# Patient Record
Sex: MALE | Race: OTHER | HISPANIC OR LATINO | Employment: UNEMPLOYED | URBAN - METROPOLITAN AREA
[De-identification: names, ages, dates, MRNs, and addresses within clinical notes are randomized per-mention and may not be internally consistent; named-entity substitution may affect disease eponyms.]

---

## 2020-08-13 ENCOUNTER — APPOINTMENT (OUTPATIENT)
Dept: CT IMAGING | Age: 60
DRG: 241 | End: 2020-08-13
Attending: EMERGENCY MEDICINE
Payer: MEDICAID

## 2020-08-13 ENCOUNTER — HOSPITAL ENCOUNTER (INPATIENT)
Age: 60
LOS: 6 days | Discharge: HOME | DRG: 241 | End: 2020-08-19
Attending: EMERGENCY MEDICINE | Admitting: SURGERY
Payer: MEDICAID

## 2020-08-13 ENCOUNTER — APPOINTMENT (OUTPATIENT)
Dept: CT IMAGING | Age: 60
DRG: 241 | End: 2020-08-13
Attending: SURGERY
Payer: MEDICAID

## 2020-08-13 ENCOUNTER — APPOINTMENT (OUTPATIENT)
Dept: GENERAL RADIOLOGY | Age: 60
DRG: 241 | End: 2020-08-13
Attending: SURGERY
Payer: MEDICAID

## 2020-08-13 DIAGNOSIS — K85.81 OTHER ACUTE PANCREATITIS WITH UNINFECTED NECROSIS: ICD-10-CM

## 2020-08-13 DIAGNOSIS — R10.9 CONTINUOUS SEVERE ABDOMINAL PAIN: ICD-10-CM

## 2020-08-13 DIAGNOSIS — K86.89 PANCREATIC MASS: ICD-10-CM

## 2020-08-13 DIAGNOSIS — R11.2 NON-INTRACTABLE VOMITING WITH NAUSEA, UNSPECIFIED VOMITING TYPE: ICD-10-CM

## 2020-08-13 DIAGNOSIS — K26.5 DUODENAL ULCER WITH PERFORATION (HCC): Primary | ICD-10-CM

## 2020-08-13 LAB
ALBUMIN SERPL-MCNC: 3.8 G/DL (ref 3.5–5)
ALBUMIN/GLOB SERPL: 0.9 {RATIO} (ref 1.1–2.2)
ALP SERPL-CCNC: 104 U/L (ref 45–117)
ALT SERPL-CCNC: 17 U/L (ref 12–78)
ANION GAP SERPL CALC-SCNC: 4 MMOL/L (ref 5–15)
AST SERPL-CCNC: 20 U/L (ref 15–37)
BASOPHILS # BLD: 0.1 K/UL (ref 0–0.1)
BASOPHILS # BLD: 0.2 K/UL (ref 0–0.1)
BASOPHILS NFR BLD: 0 % (ref 0–1)
BASOPHILS NFR BLD: 1 % (ref 0–1)
BILIRUB SERPL-MCNC: 0.2 MG/DL (ref 0.2–1)
BUN SERPL-MCNC: 13 MG/DL (ref 6–20)
BUN/CREAT SERPL: 12 (ref 12–20)
CALCIUM SERPL-MCNC: 9.3 MG/DL (ref 8.5–10.1)
CHLORIDE SERPL-SCNC: 104 MMOL/L (ref 97–108)
CO2 SERPL-SCNC: 31 MMOL/L (ref 21–32)
COMMENT, HOLDF: NORMAL
CREAT SERPL-MCNC: 1.06 MG/DL (ref 0.7–1.3)
DIFFERENTIAL METHOD BLD: ABNORMAL
DIFFERENTIAL METHOD BLD: ABNORMAL
EOSINOPHIL # BLD: 0.1 K/UL (ref 0–0.4)
EOSINOPHIL # BLD: 0.4 K/UL (ref 0–0.4)
EOSINOPHIL NFR BLD: 0 % (ref 0–7)
EOSINOPHIL NFR BLD: 2 % (ref 0–7)
ERYTHROCYTE [DISTWIDTH] IN BLOOD BY AUTOMATED COUNT: 15 % (ref 11.5–14.5)
ERYTHROCYTE [DISTWIDTH] IN BLOOD BY AUTOMATED COUNT: 15 % (ref 11.5–14.5)
GLOBULIN SER CALC-MCNC: 4.1 G/DL (ref 2–4)
GLUCOSE SERPL-MCNC: 109 MG/DL (ref 65–100)
HCT VFR BLD AUTO: 46.8 % (ref 36.6–50.3)
HCT VFR BLD AUTO: 49 % (ref 36.6–50.3)
HGB BLD-MCNC: 15.3 G/DL (ref 12.1–17)
HGB BLD-MCNC: 15.9 G/DL (ref 12.1–17)
IMM GRANULOCYTES # BLD AUTO: 0 K/UL (ref 0–0.04)
IMM GRANULOCYTES # BLD AUTO: 0.1 K/UL (ref 0–0.04)
IMM GRANULOCYTES NFR BLD AUTO: 0 % (ref 0–0.5)
IMM GRANULOCYTES NFR BLD AUTO: 1 % (ref 0–0.5)
LIPASE SERPL-CCNC: 1517 U/L (ref 73–393)
LYMPHOCYTES # BLD: 2.6 K/UL (ref 0.8–3.5)
LYMPHOCYTES # BLD: 5.4 K/UL (ref 0.8–3.5)
LYMPHOCYTES NFR BLD: 13 % (ref 12–49)
LYMPHOCYTES NFR BLD: 25 % (ref 12–49)
MCH RBC QN AUTO: 27.5 PG (ref 26–34)
MCH RBC QN AUTO: 27.6 PG (ref 26–34)
MCHC RBC AUTO-ENTMCNC: 32.4 G/DL (ref 30–36.5)
MCHC RBC AUTO-ENTMCNC: 32.7 G/DL (ref 30–36.5)
MCV RBC AUTO: 84.3 FL (ref 80–99)
MCV RBC AUTO: 84.8 FL (ref 80–99)
MONOCYTES # BLD: 0.9 K/UL (ref 0–1)
MONOCYTES # BLD: 1.5 K/UL (ref 0–1)
MONOCYTES NFR BLD: 4 % (ref 5–13)
MONOCYTES NFR BLD: 7 % (ref 5–13)
NEUTS BAND NFR BLD MANUAL: 1 %
NEUTS SEG # BLD: 14.3 K/UL (ref 1.8–8)
NEUTS SEG # BLD: 16.2 K/UL (ref 1.8–8)
NEUTS SEG NFR BLD: 65 % (ref 32–75)
NEUTS SEG NFR BLD: 79 % (ref 32–75)
NRBC # BLD: 0 K/UL (ref 0–0.01)
NRBC # BLD: 0 K/UL (ref 0–0.01)
NRBC BLD-RTO: 0 PER 100 WBC
NRBC BLD-RTO: 0 PER 100 WBC
OTHER CELLS NFR BLD MANUAL: 2 %
PLATELET # BLD AUTO: 310 K/UL (ref 150–400)
PLATELET # BLD AUTO: 325 K/UL (ref 150–400)
PMV BLD AUTO: 9.5 FL (ref 8.9–12.9)
PMV BLD AUTO: 9.7 FL (ref 8.9–12.9)
POTASSIUM SERPL-SCNC: 3.2 MMOL/L (ref 3.5–5.1)
PROT SERPL-MCNC: 7.9 G/DL (ref 6.4–8.2)
RBC # BLD AUTO: 5.55 M/UL (ref 4.1–5.7)
RBC # BLD AUTO: 5.78 M/UL (ref 4.1–5.7)
RBC MORPH BLD: ABNORMAL
SAMPLES BEING HELD,HOLD: NORMAL
SODIUM SERPL-SCNC: 139 MMOL/L (ref 136–145)
WBC # BLD AUTO: 20.5 K/UL (ref 4.1–11.1)
WBC # BLD AUTO: 21.7 K/UL (ref 4.1–11.1)

## 2020-08-13 PROCEDURE — C1729 CATH, DRAINAGE: HCPCS

## 2020-08-13 PROCEDURE — 87205 SMEAR GRAM STAIN: CPT

## 2020-08-13 PROCEDURE — 96361 HYDRATE IV INFUSION ADD-ON: CPT

## 2020-08-13 PROCEDURE — 74177 CT ABD & PELVIS W/CONTRAST: CPT

## 2020-08-13 PROCEDURE — 86677 HELICOBACTER PYLORI ANTIBODY: CPT

## 2020-08-13 PROCEDURE — 65270000029 HC RM PRIVATE

## 2020-08-13 PROCEDURE — C9113 INJ PANTOPRAZOLE SODIUM, VIA: HCPCS | Performed by: SURGERY

## 2020-08-13 PROCEDURE — 77030003630 HC NDL PERC VASC COOK -B

## 2020-08-13 PROCEDURE — 99221 1ST HOSP IP/OBS SF/LOW 40: CPT | Performed by: SURGERY

## 2020-08-13 PROCEDURE — 0D993ZZ DRAINAGE OF DUODENUM, PERCUTANEOUS APPROACH: ICD-10-PCS | Performed by: STUDENT IN AN ORGANIZED HEALTH CARE EDUCATION/TRAINING PROGRAM

## 2020-08-13 PROCEDURE — 74018 RADEX ABDOMEN 1 VIEW: CPT

## 2020-08-13 PROCEDURE — 80053 COMPREHEN METABOLIC PANEL: CPT

## 2020-08-13 PROCEDURE — 96374 THER/PROPH/DIAG INJ IV PUSH: CPT

## 2020-08-13 PROCEDURE — 74011250636 HC RX REV CODE- 250/636: Performed by: SURGERY

## 2020-08-13 PROCEDURE — 99152 MOD SED SAME PHYS/QHP 5/>YRS: CPT

## 2020-08-13 PROCEDURE — 74011250636 HC RX REV CODE- 250/636: Performed by: EMERGENCY MEDICINE

## 2020-08-13 PROCEDURE — 87075 CULTR BACTERIA EXCEPT BLOOD: CPT

## 2020-08-13 PROCEDURE — 74011000250 HC RX REV CODE- 250: Performed by: SURGERY

## 2020-08-13 PROCEDURE — 85025 COMPLETE CBC W/AUTO DIFF WBC: CPT

## 2020-08-13 PROCEDURE — 74011636320 HC RX REV CODE- 636/320: Performed by: EMERGENCY MEDICINE

## 2020-08-13 PROCEDURE — 74011000258 HC RX REV CODE- 258: Performed by: SURGERY

## 2020-08-13 PROCEDURE — 36415 COLL VENOUS BLD VENIPUNCTURE: CPT

## 2020-08-13 PROCEDURE — 93005 ELECTROCARDIOGRAM TRACING: CPT

## 2020-08-13 PROCEDURE — 83690 ASSAY OF LIPASE: CPT

## 2020-08-13 PROCEDURE — 99285 EMERGENCY DEPT VISIT HI MDM: CPT

## 2020-08-13 RX ORDER — SODIUM CHLORIDE 0.9 % (FLUSH) 0.9 %
10 SYRINGE (ML) INJECTION
Status: COMPLETED | OUTPATIENT
Start: 2020-08-13 | End: 2020-08-13

## 2020-08-13 RX ORDER — LORAZEPAM 2 MG/ML
1 INJECTION INTRAMUSCULAR ONCE
Status: ACTIVE | OUTPATIENT
Start: 2020-08-13 | End: 2020-08-13

## 2020-08-13 RX ORDER — HYDROMORPHONE HYDROCHLORIDE 2 MG/ML
1 INJECTION, SOLUTION INTRAMUSCULAR; INTRAVENOUS; SUBCUTANEOUS ONCE
Status: DISPENSED | OUTPATIENT
Start: 2020-08-13 | End: 2020-08-13

## 2020-08-13 RX ORDER — ONDANSETRON 2 MG/ML
INJECTION INTRAMUSCULAR; INTRAVENOUS
Status: DISPENSED
Start: 2020-08-13 | End: 2020-08-13

## 2020-08-13 RX ORDER — ONDANSETRON 2 MG/ML
4 INJECTION INTRAMUSCULAR; INTRAVENOUS
Status: DISCONTINUED | OUTPATIENT
Start: 2020-08-13 | End: 2020-08-13

## 2020-08-13 RX ORDER — FENTANYL CITRATE 50 UG/ML
100 INJECTION, SOLUTION INTRAMUSCULAR; INTRAVENOUS
Status: DISCONTINUED | OUTPATIENT
Start: 2020-08-13 | End: 2020-08-13

## 2020-08-13 RX ORDER — SODIUM CHLORIDE 9 MG/ML
125 INJECTION, SOLUTION INTRAVENOUS CONTINUOUS
Status: DISCONTINUED | OUTPATIENT
Start: 2020-08-13 | End: 2020-08-19 | Stop reason: HOSPADM

## 2020-08-13 RX ORDER — SODIUM CHLORIDE 9 MG/ML
25 INJECTION, SOLUTION INTRAVENOUS CONTINUOUS
Status: DISCONTINUED | OUTPATIENT
Start: 2020-08-13 | End: 2020-08-13

## 2020-08-13 RX ORDER — HYDROMORPHONE HYDROCHLORIDE 1 MG/ML
0.5 INJECTION, SOLUTION INTRAMUSCULAR; INTRAVENOUS; SUBCUTANEOUS
Status: DISCONTINUED | OUTPATIENT
Start: 2020-08-13 | End: 2020-08-19 | Stop reason: HOSPADM

## 2020-08-13 RX ORDER — MORPHINE SULFATE 2 MG/ML
4 INJECTION, SOLUTION INTRAMUSCULAR; INTRAVENOUS
Status: DISCONTINUED | OUTPATIENT
Start: 2020-08-13 | End: 2020-08-13

## 2020-08-13 RX ORDER — ONDANSETRON 2 MG/ML
4 INJECTION INTRAMUSCULAR; INTRAVENOUS
Status: DISCONTINUED | OUTPATIENT
Start: 2020-08-13 | End: 2020-08-19 | Stop reason: HOSPADM

## 2020-08-13 RX ORDER — MIDAZOLAM HYDROCHLORIDE 1 MG/ML
1-5 INJECTION INTRAMUSCULAR; INTRAVENOUS
Status: DISCONTINUED | OUTPATIENT
Start: 2020-08-13 | End: 2020-08-13

## 2020-08-13 RX ADMIN — PIPERACILLIN AND TAZOBACTAM 3.38 G: 3; .375 INJECTION, POWDER, LYOPHILIZED, FOR SOLUTION INTRAVENOUS at 11:55

## 2020-08-13 RX ADMIN — SODIUM CHLORIDE 1000 ML: 900 INJECTION, SOLUTION INTRAVENOUS at 01:17

## 2020-08-13 RX ADMIN — PIPERACILLIN AND TAZOBACTAM 3.38 G: 3; .375 INJECTION, POWDER, LYOPHILIZED, FOR SOLUTION INTRAVENOUS at 03:16

## 2020-08-13 RX ADMIN — FENTANYL CITRATE 50 MCG: 50 INJECTION, SOLUTION INTRAMUSCULAR; INTRAVENOUS at 09:51

## 2020-08-13 RX ADMIN — HYDROMORPHONE HYDROCHLORIDE 0.5 MG: 1 INJECTION, SOLUTION INTRAMUSCULAR; INTRAVENOUS; SUBCUTANEOUS at 05:23

## 2020-08-13 RX ADMIN — ONDANSETRON 4 MG: 2 INJECTION INTRAMUSCULAR; INTRAVENOUS at 02:56

## 2020-08-13 RX ADMIN — IOPAMIDOL 100 ML: 755 INJECTION, SOLUTION INTRAVENOUS at 02:08

## 2020-08-13 RX ADMIN — MORPHINE SULFATE 4 MG: 2 INJECTION, SOLUTION INTRAMUSCULAR; INTRAVENOUS at 01:22

## 2020-08-13 RX ADMIN — HYDROMORPHONE HYDROCHLORIDE 0.5 MG: 1 INJECTION, SOLUTION INTRAMUSCULAR; INTRAVENOUS; SUBCUTANEOUS at 14:47

## 2020-08-13 RX ADMIN — SODIUM CHLORIDE 40 MG: 9 INJECTION, SOLUTION INTRAMUSCULAR; INTRAVENOUS; SUBCUTANEOUS at 20:34

## 2020-08-13 RX ADMIN — FENTANYL CITRATE 50 MCG: 50 INJECTION, SOLUTION INTRAMUSCULAR; INTRAVENOUS at 09:46

## 2020-08-13 RX ADMIN — PIPERACILLIN AND TAZOBACTAM 3.38 G: 3; .375 INJECTION, POWDER, LYOPHILIZED, FOR SOLUTION INTRAVENOUS at 19:26

## 2020-08-13 RX ADMIN — MORPHINE SULFATE 4 MG: 2 INJECTION, SOLUTION INTRAMUSCULAR; INTRAVENOUS at 02:56

## 2020-08-13 RX ADMIN — HYDROMORPHONE HYDROCHLORIDE 0.5 MG: 1 INJECTION, SOLUTION INTRAMUSCULAR; INTRAVENOUS; SUBCUTANEOUS at 12:15

## 2020-08-13 RX ADMIN — MIDAZOLAM HYDROCHLORIDE 2 MG: 1 INJECTION, SOLUTION INTRAMUSCULAR; INTRAVENOUS at 09:51

## 2020-08-13 RX ADMIN — MIDAZOLAM HYDROCHLORIDE 2 MG: 1 INJECTION, SOLUTION INTRAMUSCULAR; INTRAVENOUS at 09:46

## 2020-08-13 RX ADMIN — ONDANSETRON 4 MG: 2 INJECTION INTRAMUSCULAR; INTRAVENOUS at 01:17

## 2020-08-13 RX ADMIN — HYDROMORPHONE HYDROCHLORIDE 0.5 MG: 1 INJECTION, SOLUTION INTRAMUSCULAR; INTRAVENOUS; SUBCUTANEOUS at 20:34

## 2020-08-13 RX ADMIN — SODIUM CHLORIDE 40 MG: 9 INJECTION, SOLUTION INTRAMUSCULAR; INTRAVENOUS; SUBCUTANEOUS at 11:54

## 2020-08-13 RX ADMIN — SODIUM CHLORIDE 125 ML/HR: 900 INJECTION, SOLUTION INTRAVENOUS at 03:57

## 2020-08-13 RX ADMIN — SODIUM CHLORIDE 125 ML/HR: 900 INJECTION, SOLUTION INTRAVENOUS at 20:34

## 2020-08-13 RX ADMIN — HYDROMORPHONE HYDROCHLORIDE 0.5 MG: 1 INJECTION, SOLUTION INTRAMUSCULAR; INTRAVENOUS; SUBCUTANEOUS at 17:15

## 2020-08-13 RX ADMIN — SODIUM CHLORIDE 25 ML/HR: 900 INJECTION, SOLUTION INTRAVENOUS at 09:12

## 2020-08-13 RX ADMIN — Medication 10 ML: at 02:07

## 2020-08-13 NOTE — PROGRESS NOTES
Problem: Pressure Injury - Risk of  Goal: *Prevention of pressure injury  Description: Document Cristiano Scale and appropriate interventions in the flowsheet.   Outcome: Progressing Towards Goal  Note: Pressure Injury Interventions:

## 2020-08-13 NOTE — ED TRIAGE NOTES
Pt brought to ER by EMS on stretcher from home. Pt speaks only [place], but ER tech at bedside able to translate. Pt c/o R sided abdominal pain and N/V since 11pm tonight although this pain has been going on intermittently for months. Pt has discharge paperwork from [facility] that states he has a benign pancreatic mass.

## 2020-08-13 NOTE — PROGRESS NOTES
Total sedation time for drainage procedure was 10 minutes. 4 mg of Versed given along with 100 mcg of Fentanyl IV.

## 2020-08-13 NOTE — PROGRESS NOTES
Problem: Pressure Injury - Risk of  Goal: *Prevention of pressure injury  Description: Document Cristiano Scale and appropriate interventions in the flowsheet.   Outcome: Progressing Towards Goal  Note: Pressure Injury Interventions:         Problem: Patient Education: Go to Patient Education Activity  Goal: Patient/Family Education  Outcome: Progressing Towards Goal     Problem: Pain  Goal: *Control of Pain  Outcome: Progressing Towards Goal     Problem: Patient Education: Go to Patient Education Activity  Goal: Patient/Family Education  Outcome: Progressing Towards Goal

## 2020-08-13 NOTE — H&P
Surgery History and Physcial    Subjective:      Reza Escobedo is a 64 y.o. male who presents for evaluation of abdominal pain. He is s/p lap cholecystectomy last fall for pancreatitis and has a history of nonmalignant neuroendocrine pancreatic mass. The pain is located in the RUQ, epigastric without radiation. Pain is described as sharp and measures 10/10 in intensity. Onset of pain was 7 hours ago. Aggravating factors include movement and eating. Alleviating factors include pain meds. Pt denies fevers, chills, nausea or vomiting. He does not use alcohol. Patient Active Problem List    Diagnosis Date Noted    [facility] 08/13/2020     History reviewed. No pertinent past medical history. Past Surgical History:   Procedure Laterality Date    HX CHOLECYSTECTOMY        Social History     Tobacco Use    Smoking status: Current Every Day Smoker   Substance Use Topics    Alcohol use: Yes     Comment: socially      History reviewed. No pertinent family history. Prior to Admission medications    Not on File     No Known Allergies      Review of Systems   Constitutional: Negative for chills, diaphoresis and fever. Respiratory: Negative for shortness of breath and wheezing. Cardiovascular: Negative for chest pain and palpitations. Gastrointestinal: Positive for abdominal pain. Negative for diarrhea, nausea and vomiting. Musculoskeletal: Negative for myalgias. Hematological: Does not bruise/bleed easily. Objective:     Visit Vitals  /68   Pulse 90   Temp 98.2 °F (36.8 °C)   Resp 18   Ht 5' 11\" (1.803 m)   Wt 134 lb (60.8 kg)   SpO2 98%   BMI 18.69 kg/m²       Physical Exam  Constitutional:       General: He is not in acute distress. Appearance: He is well-developed. HENT:      Head: Normocephalic and atraumatic. Cardiovascular:      Rate and Rhythm: Normal rate and regular rhythm. Heart sounds: Normal heart sounds.    Pulmonary:      Breath sounds: Normal breath sounds. No wheezing or rales. Abdominal:      General: Abdomen is flat. Bowel sounds are normal. There is no distension. Palpations: Abdomen is soft. There is no mass. Tenderness: There is abdominal tenderness in the right upper quadrant. There is no guarding or rebound. Comments: Well healed laparoscopy scars   Musculoskeletal: Normal range of motion. Lymphadenopathy:      Cervical: No cervical adenopathy. Imaging:  images and reports reviewed    Lab Review:    Recent Results (from the past 24 hour(s))   CBC WITH AUTOMATED DIFF    Collection Time: 08/13/20  1:16 AM   Result Value Ref Range    WBC 21.7 (H) 4.1 - 11.1 K/uL    RBC 5.55 4.10 - 5.70 M/uL    HGB 15.3 12.1 - 17.0 g/dL    HCT 46.8 36.6 - 50.3 %    MCV 84.3 80.0 - 99.0 FL    MCH 27.6 26.0 - 34.0 PG    MCHC 32.7 30.0 - 36.5 g/dL    RDW 15.0 (H) 11.5 - 14.5 %    PLATELET 485 734 - 034 K/uL    MPV 9.7 8.9 - 12.9 FL    NRBC 0.0 0  WBC    ABSOLUTE NRBC 0.00 0.00 - 0.01 K/uL    NEUTROPHILS 65 32 - 75 %    BAND NEUTROPHILS 1 %    LYMPHOCYTES 25 12 - 49 %    MONOCYTES 4 (L) 5 - 13 %    EOSINOPHILS 2 0 - 7 %    BASOPHILS 1 0 - 1 %    OTHER CELL 2      IMMATURE GRANULOCYTES 0 0.0 - 0.5 %    ABS. NEUTROPHILS 14.3 (H) 1.8 - 8.0 K/UL    ABS. LYMPHOCYTES 5.4 (H) 0.8 - 3.5 K/UL    ABS. MONOCYTES 0.9 0.0 - 1.0 K/UL    ABS. EOSINOPHILS 0.4 0.0 - 0.4 K/UL    ABS. BASOPHILS 0.2 (H) 0.0 - 0.1 K/UL    ABS. IMM.  GRANS. 0.0 0.00 - 0.04 K/UL    DF MANUAL      RBC COMMENTS NORMOCYTIC, NORMOCHROMIC     METABOLIC PANEL, COMPREHENSIVE    Collection Time: 08/13/20  1:16 AM   Result Value Ref Range    Sodium 139 136 - 145 mmol/L    Potassium 3.2 (L) 3.5 - 5.1 mmol/L    Chloride 104 97 - 108 mmol/L    CO2 31 21 - 32 mmol/L    Anion gap 4 (L) 5 - 15 mmol/L    Glucose 109 (H) 65 - 100 mg/dL    BUN 13 6 - 20 MG/DL    Creatinine 1.06 0.70 - 1.30 MG/DL    BUN/Creatinine ratio 12 12 - 20      GFR est AA >60 >60 ml/min/1.73m2    GFR est non-AA >60 >60 ml/min/1.73m2    Calcium 9.3 8.5 - 10.1 MG/DL    Bilirubin, total 0.2 0.2 - 1.0 MG/DL    ALT (SGPT) 17 12 - 78 U/L    AST (SGOT) 20 15 - 37 U/L    Alk. phosphatase 104 45 - 117 U/L    Protein, total 7.9 6.4 - 8.2 g/dL    Albumin 3.8 3.5 - 5.0 g/dL    Globulin 4.1 (H) 2.0 - 4.0 g/dL    A-G Ratio 0.9 (L) 1.1 - 2.2     LIPASE    Collection Time: 08/13/20  1:16 AM   Result Value Ref Range    Lipase 1,517 (H) 73 - 393 U/L   CBC WITH AUTOMATED DIFF    Collection Time: 08/13/20  5:37 AM   Result Value Ref Range    WBC 20.5 (H) 4.1 - 11.1 K/uL    RBC 5.78 (H) 4.10 - 5.70 M/uL    HGB 15.9 12.1 - 17.0 g/dL    HCT 49.0 36.6 - 50.3 %    MCV 84.8 80.0 - 99.0 FL    MCH 27.5 26.0 - 34.0 PG    MCHC 32.4 30.0 - 36.5 g/dL    RDW 15.0 (H) 11.5 - 14.5 %    PLATELET 319 501 - 703 K/uL    MPV 9.5 8.9 - 12.9 FL    NRBC 0.0 0  WBC    ABSOLUTE NRBC 0.00 0.00 - 0.01 K/uL    NEUTROPHILS 79 (H) 32 - 75 %    LYMPHOCYTES 13 12 - 49 %    MONOCYTES 7 5 - 13 %    EOSINOPHILS 0 0 - 7 %    BASOPHILS 0 0 - 1 %    IMMATURE GRANULOCYTES 1 (H) 0.0 - 0.5 %    ABS. NEUTROPHILS 16.2 (H) 1.8 - 8.0 K/UL    ABS. LYMPHOCYTES 2.6 0.8 - 3.5 K/UL    ABS. MONOCYTES 1.5 (H) 0.0 - 1.0 K/UL    ABS. EOSINOPHILS 0.1 0.0 - 0.4 K/UL    ABS. BASOPHILS 0.1 0.0 - 0.1 K/UL    ABS. IMM. GRANS. 0.1 (H) 0.00 - 0.04 K/UL    DF AUTOMATED           Assessment:     65 yo thin male with CT findings of contained medial perforation of second portion of duodenum with 4 cm contained fluid collection. H/o biopsied non-malignant neuroendocrine tumor. Plan:     I recommend proceeding with inpatient admission. Protonix iv bid. H pylori testing. NGT decompression. IR consultation for consideration for drainage. Suspect can avoid operative intervention.

## 2020-08-13 NOTE — PROGRESS NOTES
General Surgery End of Shift Nursing Note    Bedside shift change report given to Zahira (oncoming nurse) by Segnudo Perez (offgoing nurse). Report included the following information SBAR, Kardex and Procedure Summary. Shift worked:   [id number]   Summary of shift:    NGT insertion   Issues for physician to address:        Number times ambulated in hallway past shift: 0    Number of times OOB to chair past shift: 0    Pain Management:  Current medication: dilaudid  Patient states pain is manageable on current pain medication: YES    GI:    Current diet:  DIET NPO Except Meds    Tolerating current diet: NO  Passing flatus: NO  Last Bowel Movement:    Appearance:     Respiratory:    Incentive Spirometer at bedside: NO  Patient instructed on use: NO    Patient Safety:    Falls Score: 1  Bed Alarm On? No  Sitter?  No    Grover Bower

## 2020-08-13 NOTE — H&P
Radiology History and Physical    Patient: Desire Spann 64 y.o. male       Chief Complaint: Abdominal Pain; Vomiting; and Diarrhea      History of Present Illness: 4cm intramural duodenal collection, for drainage. History:    History reviewed. No pertinent past medical history. History reviewed. No pertinent family history.   Social History     Socioeconomic History    Marital status: SINGLE     Spouse name: Not on file    Number of children: Not on file    Years of education: Not on file    Highest education level: Not on file   Occupational History    Not on file   Social Needs    Financial resource strain: Not on file    Food insecurity     Worry: Not on file     Inability: Not on file    Transportation needs     Medical: Not on file     Non-medical: Not on file   Tobacco Use    Smoking status: Current Every Day Smoker   Substance and Sexual Activity    Alcohol use: Yes     Comment: socially    Drug use: Never    Sexual activity: Not on file   Lifestyle    Physical activity     Days per week: Not on file     Minutes per session: Not on file    Stress: Not on file   Relationships    Social connections     Talks on phone: Not on file     Gets together: Not on file     Attends Faith service: Not on file     Active member of club or organization: Not on file     Attends meetings of clubs or organizations: Not on file     Relationship status: Not on file    Intimate partner violence     Fear of current or ex partner: Not on file     Emotionally abused: Not on file     Physically abused: Not on file     Forced sexual activity: Not on file   Other Topics Concern    Not on file   Social History Narrative    Not on file       Allergies: No Known Allergies    Current Medications:  Current Facility-Administered Medications   Medication Dose Route Frequency    piperacillin-tazobactam (ZOSYN) 3.375 g in 0.9% sodium chloride (MBP/ADV) 100 mL  3.375 g IntraVENous Q8H    0.9% sodium chloride infusion 125 mL/hr IntraVENous CONTINUOUS    HYDROmorphone (PF) (DILAUDID) injection 0.5 mg  0.5 mg IntraVENous Q2H PRN    ondansetron (ZOFRAN) injection 4 mg  4 mg IntraVENous Q4H PRN    pantoprazole (PROTONIX) 40 mg in 0.9% sodium chloride 10 mL injection  40 mg IntraVENous Q12H    benzocaine (HURRICANE) 20 % spray 1 Spray  1 Spray Mucous Membrane ONCE    LORazepam (ATIVAN) injection 1 mg  1 mg IntraVENous ONCE    HYDROmorphone (DILAUDID) injection 1 mg  1 mg IntraVENous ONCE    fentaNYL citrate (PF) injection 100 mcg  100 mcg IntraVENous Multiple    0.9% sodium chloride infusion  25 mL/hr IntraVENous CONTINUOUS    midazolam (PF) (VERSED) injection 1-5 mg  1-5 mg IntraVENous Multiple        Physical Exam:  Blood pressure 182/84, pulse 71, temperature 98.4 °F (36.9 °C), resp. rate 20, height 5' 11\" (1.803 m), weight 60.8 kg (134 lb), SpO2 99 %. GENERAL: alert, cooperative, no distress, appears stated age  LUNG: clear to auscultation bilaterally  HEART: regular rate and rhythm  ABD: Non tender, non distended. Alerts:    Hospital Problems  Date Reviewed: 8/13/2020          Codes Class Noted POA    * (Principal) [facility] ICD-10-CM: K26.5  ICD-9-CM: 532.50  8/13/2020 Yes              Laboratory:      Recent Labs     08/13/20  0537 08/13/20  0116   HGB 15.9 15.3   HCT 49.0 46.8   WBC 20.5* 21.7*    310   BUN  --  13   CREA  --  1.06   K  --  3.2*         Plan of Care/Planned Procedure:  Risks, benefits, and alternatives reviewed with patient and he agrees to proceed with the procedure. Deemed appropriate or moderate sedation with versed and fentanyl.       Josphine Dandy, MD

## 2020-08-13 NOTE — ED PROVIDER NOTES
EMERGENCY DEPARTMENT HISTORY AND PHYSICAL EXAM      Date: 8/13/2020  Patient Name: Brock Zhang    History of Presenting Illness     Chief Complaint   Patient presents with    Abdominal Pain    Vomiting    Diarrhea       History Provided By: Patient    HPI: Brock Zhang, 64 y.o. male with past medical history of pancreatitis, nonmalignant pancreatic mass presenting today with severe and sudden onset of abdominal pain. The patient notes that his symptom started last night at 11 PM.  He woke up from sleep due to the pain. He states the pain is in his epigastric region and the right upper quadrant. He had pancreatitis in November of last year and had a cholecystectomy at that point. He did have a pancreatic mass that was found and biopsied. It did not appear malignant, but did have signs of a possible neuroendocrine tumor. The patient denies any alcohol use. Denies any fevers or chills. He has had multiple episodes of nonbloody nonbilious emesis. Notes the pain is 10/10 and nonradiating. No exacerbating or alleviating factors. There are no other complaints, changes, or physical findings at this time. PCP: None    No current facility-administered medications on file prior to encounter. No current outpatient medications on file prior to encounter. Past History     Past Medical History:  History reviewed. No pertinent past medical history. Pancreatitis    Past Surgical History:  History reviewed. No pertinent surgical history. Cholecystectomy    Family History:  History reviewed. No pertinent family history.     Social History:  Social History     Tobacco Use    Smoking status: Current Every Day Smoker   Substance Use Topics    Alcohol use: Yes     Comment: socially    Drug use: Never       Allergies:  No Known Allergies      Review of Systems   Constitutional: No  fever  Skin: No  rash  HEENT: No  nasal congestion  Resp: No cough  CV: No chest pain  GI: + vomiting  : No dysuria  MSK: No joint pain  Neuro: No numbness  Psych: No suicidal      Physical Exam     Patient Vitals for the past 12 hrs:   Temp Pulse Resp BP SpO2   08/13/20 0300    157/84    08/13/20 0230    164/77    08/13/20 0215    173/84    08/13/20 0145    172/76 97 %   08/13/20 0130    168/78 99 %   08/13/20 0115    160/82 100 %   08/13/20 0108    170/84    08/13/20 0107 98.1 °F (36.7 °C) 86 18 170/84 100 %     General: alert, moderate distress  Eyes: EOMI, normal conjunctiva  ENT: moist mucous membranes. Neck: Active, full ROM of neck. Skin: No rashes. no jaundice              Lungs: Equal chest expansion. no respiratory distress. clear to auscultation bilaterally No accessory muscle usage  Heart: regular rate     no peripheral edema   2+ radial pulses and DPs bilaterally  Abd:  non distended soft, Tender in the epigastric region and Tender in the RUQ. No rebound tenderness. No guarding  Back: Full ROM  MSK: Full, active ROM in all 4 extremities. Neuro: Person, Place, Time and Situation; normal speech;   Psych: Cooperative with exam; Appropriate mood and affect             Diagnostic Study Results     Labs -     Recent Results (from the past 12 hour(s))   CBC WITH AUTOMATED DIFF    Collection Time: 08/13/20  1:16 AM   Result Value Ref Range    WBC 21.7 (H) 4.1 - 11.1 K/uL    RBC 5.55 4.10 - 5.70 M/uL    HGB 15.3 12.1 - 17.0 g/dL    HCT 46.8 36.6 - 50.3 %    MCV 84.3 80.0 - 99.0 FL    MCH 27.6 26.0 - 34.0 PG    MCHC 32.7 30.0 - 36.5 g/dL    RDW 15.0 (H) 11.5 - 14.5 %    PLATELET 889 579 - 681 K/uL    MPV 9.7 8.9 - 12.9 FL    NRBC 0.0 0  WBC    ABSOLUTE NRBC 0.00 0.00 - 0.01 K/uL    NEUTROPHILS 65 32 - 75 %    BAND NEUTROPHILS 1 %    LYMPHOCYTES 25 12 - 49 %    MONOCYTES 4 (L) 5 - 13 %    EOSINOPHILS 2 0 - 7 %    BASOPHILS 1 0 - 1 %    OTHER CELL 2      IMMATURE GRANULOCYTES 0 0.0 - 0.5 %    ABS. NEUTROPHILS 14.3 (H) 1.8 - 8.0 K/UL    ABS. LYMPHOCYTES 5.4 (H) 0.8 - 3.5 K/UL    ABS.  MONOCYTES 0.9 0.0 - 1.0 K/UL    ABS. EOSINOPHILS 0.4 0.0 - 0.4 K/UL    ABS. BASOPHILS 0.2 (H) 0.0 - 0.1 K/UL    ABS. IMM. GRANS. 0.0 0.00 - 0.04 K/UL    DF MANUAL      RBC COMMENTS NORMOCYTIC, NORMOCHROMIC     METABOLIC PANEL, COMPREHENSIVE    Collection Time: 08/13/20  1:16 AM   Result Value Ref Range    Sodium 139 136 - 145 mmol/L    Potassium 3.2 (L) 3.5 - 5.1 mmol/L    Chloride 104 97 - 108 mmol/L    CO2 31 21 - 32 mmol/L    Anion gap 4 (L) 5 - 15 mmol/L    Glucose 109 (H) 65 - 100 mg/dL    BUN 13 6 - 20 MG/DL    Creatinine 1.06 0.70 - 1.30 MG/DL    BUN/Creatinine ratio 12 12 - 20      GFR est AA >60 >60 ml/min/1.73m2    GFR est non-AA >60 >60 ml/min/1.73m2    Calcium 9.3 8.5 - 10.1 MG/DL    Bilirubin, total 0.2 0.2 - 1.0 MG/DL    ALT (SGPT) 17 12 - 78 U/L    AST (SGOT) 20 15 - 37 U/L    Alk. phosphatase 104 45 - 117 U/L    Protein, total 7.9 6.4 - 8.2 g/dL    Albumin 3.8 3.5 - 5.0 g/dL    Globulin 4.1 (H) 2.0 - 4.0 g/dL    A-G Ratio 0.9 (L) 1.1 - 2.2     LIPASE    Collection Time: 08/13/20  1:16 AM   Result Value Ref Range    Lipase 1,517 (H) 73 - 393 U/L       Radiologic Studies -   CT ABD PELV W CONT   Final Result   IMPRESSION:   Wall thickening and enhancement of the second portion of the duodenum, with   adjacent 4.0 cm peripherally enhancing fluid collection; findings suggest   contained perforation of a duodenal ulcer. CT Results  (Last 48 hours)               08/13/20 0207  CT ABD PELV W CONT Final result    Impression:  IMPRESSION:   Wall thickening and enhancement of the second portion of the duodenum, with   adjacent 4.0 cm peripherally enhancing fluid collection; findings suggest   contained perforation of a duodenal ulcer. Narrative:  EXAM: CT ABD PELV W CONT       INDICATION: severe abd pain        COMPARISON: None        CONTRAST: 100 mL of Isovue-370. TECHNIQUE:    Following the uneventful intravenous administration of contrast, thin axial   images were obtained through the abdomen and pelvis. Coronal and sagittal   reconstructions were generated. Oral contrast was not administered. CT dose   reduction was achieved through use of a standardized protocol tailored for this   examination and automatic exposure control for dose modulation. FINDINGS:    LOWER THORAX: Bilateral dependent atelectasis. LIVER: No mass. BILIARY TREE: Contracted gallbladder. CBD is not dilated. SPLEEN: within normal limits. PANCREAS: No mass or ductal dilatation. ADRENALS: Unremarkable. KIDNEYS: No mass, calculus, or hydronephrosis. STOMACH: Unremarkable. SMALL BOWEL: Extensive wall thickening and enhancement in the first and second   portions of the duodenum. 4.0 x 1.8 cm peripherally enhancing fluid collection   along the medial wall of the second portion of the duodenum. COLON: No dilatation or wall thickening. APPENDIX: Normal.   PERITONEUM: No ascites or pneumoperitoneum. RETROPERITONEUM: No lymphadenopathy or aortic aneurysm. REPRODUCTIVE ORGANS: Unremarkable. URINARY BLADDER: No mass or calculus. BONES: No destructive bone lesion. ABDOMINAL WALL: No mass or hernia. ADDITIONAL COMMENTS: N/A               CXR Results  (Last 48 hours)    None          Medical Decision Making   I am the first provider for this patient. I reviewed the vital signs, available nursing notes, past medical history, past surgical history, family history and social history. Records Reviewed: Patient brought records from his admission at [facility] in November where he had pancreatitis, pancreatic mass with a mass that was biopsied with no evidence of malignancy. Vital Signs-Reviewed the patient's vital signs.   Patient Vitals for the past 12 hrs:   Temp Pulse Resp BP SpO2   08/13/20 0300    157/84    08/13/20 0230    164/77    08/13/20 0215    173/84    08/13/20 0145    172/76 97 %   08/13/20 0130    168/78 99 %   08/13/20 0115    160/82 100 %   08/13/20 0108    170/84    08/13/20 0107 98.1 °F (36.7 °C) 86 18 170/84 100 %       Pulse Oximetry Analysis - 100% on room air    Cardiac Monitor:   Rate: 86 bpm  Rhythm: Normal Sinus Rhythm     Provider Notes (Medical Decision Making):     Differential Diagnosis: Pancreatitis, colitis, perforated ulcer, electrolyte derangement    Initial Plan: Patient appears to have severe pain on exam, he is tender in the epigastric and right upper quadrant. Will order laboratory studies, CT scan, treat the patient with IV morphine and IV antiemetics and reassess after treatment. ED Course:   Initial assessment performed. The patients presenting problems have been discussed, and they are in agreement with the care plan formulated and outlined with them. I have encouraged them to ask questions as they arise throughout their visit. ED Course as of Aug 13 0312   Thu Aug 13, 2020   0204 On my interpretation the patient's laboratory studies elevated white blood cell count at 21.7, lipase is greater than 4555, metabolic panel reveals mild hypokalemia. [NW]   0236 CONSULT NOTE:   2:36 AM  I spoke with Dr. Clemencia Blood  Specialty: General Surgery  Discussed pt's hx, disposition, and available diagnostic and imaging results. Reviewed care plans. Plan: Will see and evaluate the patient. [NW]   [id number] On my interpretation the patient's CT scan there is evidence of a fluid collection near the duodenum, consistent with possible perforated duodenal ulcer that is contained. [NW]   [id number] Patient presents today with severe and sudden onset of abdominal pain. CT imaging reveals evidence of a fluid collection concerning for possible perforation of a duodenal ulcer that is contained. I spoke with Dr. Yu Grimaldo with general surgery who will admit the patient. Started Zosyn. Patient has been treated with IV morphine with good relief of his symptoms.     [NW]      ED Course User Index  [NW] Ernesto Corrigan MD       I, Betty Fraga MD, am the attending of record for this patient encounter. Procedure:  CRITICAL CARE NOTE :    3:06 AM    IMPENDING DETERIORATION -Metabolic, intraabdominal surgical emergency  ASSOCIATED RISK FACTORS - Severe abdominal pain  MANAGEMENT- Bedside Assessment and Supervision of Care  INTERPRETATION -  CT Scan  INTERVENTIONS - Metobolic interventions  CASE REVIEW - Medical Sub-Specialist, Nursing and Family  TREATMENT RESPONSE -Stable  PERFORMED BY - Self    NOTES   :    I have spent 30 minutes of critical care time involved in lab review, consultations with specialist, family decision- making, bedside attention and documentation. This time does not include time spent on separately reported billable procedures. During this entire length of time I was immediately available to the patient . Disposition: Admitted    Admission Note:  Patient is being admitted to the hospital by Service: Surgery. The results of their tests and reasons for their admission have been discussed with them and available family. They convey agreement and understanding for the need to be admitted and for their admission diagnosis. Diagnosis     Clinical Impression:   1. Duodenal ulcer with perforation ([address]    2. Continuous severe abdominal pain    3. Non-intractable vomiting with nausea, unspecified vomiting type        Attestations:    Maurice Kraft MD    Please note that this dictation was completed with MyVerse, the computer voice recognition software. Quite often unanticipated grammatical, syntax, homophones, and other interpretive errors are inadvertently transcribed by the computer software. Please disregard these errors. Please excuse any errors that have escaped final proofreading. Thank you.

## 2020-08-13 NOTE — ED NOTES
Pt stable. Verbal report given to Dede Langford RN on gen surg. All pending orders & medications reviewed at this time.

## 2020-08-13 NOTE — PROGRESS NOTES
TRANSFER - OUT REPORT:    Verbal report given to YOVANY Bee(name) on Joey Tierney  being transferred to [facility](unit) for routine progression of care       Report consisted of patients Situation, Background, Assessment and   Recommendations(SBAR). Information from the following report(s) Procedure Summary was reviewed with the receiving nurse. Lines:   Peripheral IV 08/13/20 Left Antecubital (Active)   Site Assessment Clean, dry, & intact 08/13/20 0529   Phlebitis Assessment 0 08/13/20 0529   Infiltration Assessment 0 08/13/20 0529   Dressing Status Clean, dry, & intact 08/13/20 0529   Dressing Type Transparent 08/13/20 0529   Hub Color/Line Status Pink 08/13/20 0529        Opportunity for questions and clarification was provided.
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
## 2020-08-13 NOTE — PROGRESS NOTES
Procedure reviewed with patient by Dr. Benita Christian. Opportunity to verbalize questions and concerns. Consent obtained by [employer] language line for [place]an speaking individuals.

## 2020-08-14 LAB
ANION GAP SERPL CALC-SCNC: 3 MMOL/L (ref 5–15)
ATRIAL RATE: 86 BPM
BASOPHILS # BLD: 0 K/UL (ref 0–0.1)
BASOPHILS NFR BLD: 0 % (ref 0–1)
BUN SERPL-MCNC: 7 MG/DL (ref 6–20)
BUN/CREAT SERPL: 8 (ref 12–20)
CALCIUM SERPL-MCNC: 8.2 MG/DL (ref 8.5–10.1)
CALCULATED P AXIS, ECG09: 82 DEGREES
CALCULATED R AXIS, ECG10: 81 DEGREES
CALCULATED T AXIS, ECG11: 72 DEGREES
CHLORIDE SERPL-SCNC: 109 MMOL/L (ref 97–108)
CO2 SERPL-SCNC: 29 MMOL/L (ref 21–32)
CREAT SERPL-MCNC: 0.83 MG/DL (ref 0.7–1.3)
DIAGNOSIS, 93000: NORMAL
DIFFERENTIAL METHOD BLD: ABNORMAL
EOSINOPHIL # BLD: 0.2 K/UL (ref 0–0.4)
EOSINOPHIL NFR BLD: 1 % (ref 0–7)
ERYTHROCYTE [DISTWIDTH] IN BLOOD BY AUTOMATED COUNT: 15.4 % (ref 11.5–14.5)
GLUCOSE SERPL-MCNC: 85 MG/DL (ref 65–100)
H PYLORI IGA SER-ACNC: <9 UNITS (ref 0–8.9)
H PYLORI IGG SER IA-ACNC: 0.7 INDEX VALUE (ref 0–0.79)
HCT VFR BLD AUTO: 46.3 % (ref 36.6–50.3)
HGB BLD-MCNC: 14.5 G/DL (ref 12.1–17)
IMM GRANULOCYTES # BLD AUTO: 0 K/UL (ref 0–0.04)
IMM GRANULOCYTES NFR BLD AUTO: 0 % (ref 0–0.5)
LYMPHOCYTES # BLD: 3.3 K/UL (ref 0.8–3.5)
LYMPHOCYTES NFR BLD: 28 % (ref 12–49)
MCH RBC QN AUTO: 27.2 PG (ref 26–34)
MCHC RBC AUTO-ENTMCNC: 31.3 G/DL (ref 30–36.5)
MCV RBC AUTO: 86.9 FL (ref 80–99)
MONOCYTES # BLD: 1 K/UL (ref 0–1)
MONOCYTES NFR BLD: 8 % (ref 5–13)
NEUTS SEG # BLD: 7.2 K/UL (ref 1.8–8)
NEUTS SEG NFR BLD: 63 % (ref 32–75)
NRBC # BLD: 0 K/UL (ref 0–0.01)
NRBC BLD-RTO: 0 PER 100 WBC
P-R INTERVAL, ECG05: 136 MS
PLATELET # BLD AUTO: 273 K/UL (ref 150–400)
PMV BLD AUTO: 10.5 FL (ref 8.9–12.9)
POTASSIUM SERPL-SCNC: 3.7 MMOL/L (ref 3.5–5.1)
Q-T INTERVAL, ECG07: 378 MS
QRS DURATION, ECG06: 82 MS
QTC CALCULATION (BEZET), ECG08: 452 MS
RBC # BLD AUTO: 5.33 M/UL (ref 4.1–5.7)
SODIUM SERPL-SCNC: 141 MMOL/L (ref 136–145)
VENTRICULAR RATE, ECG03: 86 BPM
WBC # BLD AUTO: 11.7 K/UL (ref 4.1–11.1)

## 2020-08-14 PROCEDURE — 74011000258 HC RX REV CODE- 258: Performed by: SURGERY

## 2020-08-14 PROCEDURE — 36415 COLL VENOUS BLD VENIPUNCTURE: CPT

## 2020-08-14 PROCEDURE — C9113 INJ PANTOPRAZOLE SODIUM, VIA: HCPCS | Performed by: SURGERY

## 2020-08-14 PROCEDURE — 94760 N-INVAS EAR/PLS OXIMETRY 1: CPT

## 2020-08-14 PROCEDURE — 74011000250 HC RX REV CODE- 250: Performed by: SURGERY

## 2020-08-14 PROCEDURE — 99231 SBSQ HOSP IP/OBS SF/LOW 25: CPT | Performed by: SURGERY

## 2020-08-14 PROCEDURE — 85025 COMPLETE CBC W/AUTO DIFF WBC: CPT

## 2020-08-14 PROCEDURE — 74011250636 HC RX REV CODE- 250/636: Performed by: SURGERY

## 2020-08-14 PROCEDURE — 65270000029 HC RM PRIVATE

## 2020-08-14 PROCEDURE — 80048 BASIC METABOLIC PNL TOTAL CA: CPT

## 2020-08-14 RX ADMIN — HYDROMORPHONE HYDROCHLORIDE 0.5 MG: 1 INJECTION, SOLUTION INTRAMUSCULAR; INTRAVENOUS; SUBCUTANEOUS at 06:13

## 2020-08-14 RX ADMIN — PIPERACILLIN AND TAZOBACTAM 3.38 G: 3; .375 INJECTION, POWDER, LYOPHILIZED, FOR SOLUTION INTRAVENOUS at 11:28

## 2020-08-14 RX ADMIN — SODIUM CHLORIDE 125 ML/HR: 900 INJECTION, SOLUTION INTRAVENOUS at 18:43

## 2020-08-14 RX ADMIN — HYDROMORPHONE HYDROCHLORIDE 0.5 MG: 1 INJECTION, SOLUTION INTRAMUSCULAR; INTRAVENOUS; SUBCUTANEOUS at 19:29

## 2020-08-14 RX ADMIN — SODIUM CHLORIDE 40 MG: 9 INJECTION, SOLUTION INTRAMUSCULAR; INTRAVENOUS; SUBCUTANEOUS at 09:49

## 2020-08-14 RX ADMIN — HYDROMORPHONE HYDROCHLORIDE 0.5 MG: 1 INJECTION, SOLUTION INTRAMUSCULAR; INTRAVENOUS; SUBCUTANEOUS at 15:37

## 2020-08-14 RX ADMIN — HYDROMORPHONE HYDROCHLORIDE 0.5 MG: 1 INJECTION, SOLUTION INTRAMUSCULAR; INTRAVENOUS; SUBCUTANEOUS at 22:52

## 2020-08-14 RX ADMIN — HYDROMORPHONE HYDROCHLORIDE 0.5 MG: 1 INJECTION, SOLUTION INTRAMUSCULAR; INTRAVENOUS; SUBCUTANEOUS at 00:04

## 2020-08-14 RX ADMIN — PIPERACILLIN AND TAZOBACTAM 3.38 G: 3; .375 INJECTION, POWDER, LYOPHILIZED, FOR SOLUTION INTRAVENOUS at 03:55

## 2020-08-14 RX ADMIN — SODIUM CHLORIDE 40 MG: 9 INJECTION, SOLUTION INTRAMUSCULAR; INTRAVENOUS; SUBCUTANEOUS at 20:34

## 2020-08-14 RX ADMIN — PIPERACILLIN AND TAZOBACTAM 3.38 G: 3; .375 INJECTION, POWDER, LYOPHILIZED, FOR SOLUTION INTRAVENOUS at 18:42

## 2020-08-14 RX ADMIN — HYDROMORPHONE HYDROCHLORIDE 0.5 MG: 1 INJECTION, SOLUTION INTRAMUSCULAR; INTRAVENOUS; SUBCUTANEOUS at 09:52

## 2020-08-14 NOTE — PROGRESS NOTES
General Surgery End of Shift Nursing Note    Bedside shift change report given to Shahzad Treadwell RN (oncoming nurse) by Batsheva Barnhart RN (offgoing nurse). Report included the following information SBAR, Kardex and MAR. Shift worked:   [id number]     Summary of shift:   Pt has been resting in bed watchingTV with family present. Pt has complained of abdominal pain and treated twice with PRN pain medication. Pt questioned multiple times why he still has his NG tube. Dr. Nicholas Self spoke to pt via  phone to remind pt why he is NPO and why he continues to need the NG tube. Nurse used  phone to assist with obtaining accurate assessment questions. Issues for physician to address:  None at this time     Number times ambulated in hallway past shift: 0    Number of times OOB to chair past shift: 0    Pain Management:  Current medication: See MAR  Patient states pain is manageable on current pain medication: NO    GI:    Current diet:  DIET NPO Except Meds    Tolerating current diet: YES  Passing flatus: NO  Last Bowel Movement: several days ago, pt states prior to hospital admission on 8/13   Appearance: unknown    Respiratory:    Incentive Spirometer at bedside: YES  Head of bed elevated    Patient Safety:    Falls Score: 1  Bed Alarm On? No  Sitter?  No    Kell Dec

## 2020-08-14 NOTE — PROGRESS NOTES
General Surgery End of Shift Nursing Note    Bedside shift change report given to [facility], RN (oncoming nurse) by Fidelia Allison RN (offgoing nurse). Report included the following information SBAR, Kardex, Intake/Output, Recent Results and Quality Measures. Shift worked:  [id number]   Summary of shift:    Patient post IR aspiration of perforated duodenal ulcer. NG tube in place with 200 mL out overnight. IV dilaudid per [facility] for pain. Patient voiding per urinal without issue. IV antibiotics per MAR. Issues for physician to address:        Number times ambulated in hallway past shift: 0    Number of times OOB to chair past shift: 0    Pain Management:  Current medication: Dilaudid  Patient states pain is manageable on current pain medication: YES    GI:    Current diet:  DIET NPO Except Meds    Tolerating current diet: YES  Passing flatus: YES  Last Bowel Movement: several days ago   Appearance:     Respiratory:    Incentive Spirometer at bedside: YES  Patient instructed on use: YES    Patient Safety:    Falls Score: 1  Bed Alarm On? No  Sitter?  No    Candcarine Ibrahim

## 2020-08-14 NOTE — PROGRESS NOTES
Problem: Pressure Injury - Risk of  Goal: *Prevention of pressure injury  Description: Document Cristiano Scale and appropriate interventions in the flowsheet.   Outcome: Progressing Towards Goal  Note: Pressure Injury Interventions:       Moisture Interventions: Check for incontinence Q2 hours and as needed, Maintain skin hydration (lotion/cream), Minimize layers    Activity Interventions: Increase time out of bed, PT/OT evaluation, Pressure redistribution bed/mattress(bed type)         Nutrition Interventions: Document food/fluid/supplement intake                     Problem: Patient Education: Go to Patient Education Activity  Goal: Patient/Family Education  Outcome: Progressing Towards Goal     Problem: Pain  Goal: *Control of Pain  Outcome: Progressing Towards Goal     Problem: Patient Education: Go to Patient Education Activity  Goal: Patient/Family Education  Outcome: Progressing Towards Goal

## 2020-08-14 NOTE — PROGRESS NOTES
Admit Date: 2020    Subjective:     Patient has no new complaints. Feeling better. Objective:     Blood pressure 139/79, pulse 76, temperature 98.6 °F (37 °C), resp. rate 16, height 5' 11\" (1.803 m), weight 134 lb (60.8 kg), SpO2 99 %. Temp (24hrs), Av.2 °F (36.8 °C), Min:97.6 °F (36.4 °C), Max:98.6 °F (37 °C)      Physical Exam:  GENERAL: alert, cooperative, no distress, appears stated age, LUNG: clear to auscultation bilaterally, HEART: regular rate and rhythm, ABDOMEN: soft, non-tender. Bowel sounds normal. No masses,  no organomegaly, EXTREMITIES:  extremities normal, atraumatic, no cyanosis or edema    Labs:   Recent Results (from the past 24 hour(s))   CULTURE, BODY FLUID W GRAM STAIN    Collection Time: 20 10:14 AM    Specimen: Drainage   Result Value Ref Range    Special Requests: NO SPECIAL REQUESTS      GRAM STAIN 1+ WBCS SEEN      GRAM STAIN NO ORGANISMS SEEN      Culture result: PENDING    SAMPLES BEING HELD    Collection Time: 20 10:14 AM   Result Value Ref Range    SAMPLES BEING HELD CUP OF FLUID     COMMENT        Add-on orders for these samples will be processed based on acceptable specimen integrity and analyte stability, which may vary by analyte.    METABOLIC PANEL, BASIC    Collection Time: 20  2:25 AM   Result Value Ref Range    Sodium 141 136 - 145 mmol/L    Potassium 3.7 3.5 - 5.1 mmol/L    Chloride 109 (H) 97 - 108 mmol/L    CO2 29 21 - 32 mmol/L    Anion gap 3 (L) 5 - 15 mmol/L    Glucose 85 65 - 100 mg/dL    BUN 7 6 - 20 MG/DL    Creatinine 0.83 0.70 - 1.30 MG/DL    BUN/Creatinine ratio 8 (L) 12 - 20      GFR est AA >60 >60 ml/min/1.73m2    GFR est non-AA >60 >60 ml/min/1.73m2    Calcium 8.2 (L) 8.5 - 10.1 MG/DL   CBC WITH AUTOMATED DIFF    Collection Time: 20  2:25 AM   Result Value Ref Range    WBC 11.7 (H) 4.1 - 11.1 K/uL    RBC 5.33 4.10 - 5.70 M/uL    HGB 14.5 12.1 - 17.0 g/dL    HCT 46.3 36.6 - 50.3 %    MCV 86.9 80.0 - 99.0 FL    MCH 27.2 26.0 - 34.0 PG    MCHC 31.3 30.0 - 36.5 g/dL    RDW 15.4 (H) 11.5 - 14.5 %    PLATELET 757 073 - 449 K/uL    MPV 10.5 8.9 - 12.9 FL    NRBC 0.0 0  WBC    ABSOLUTE NRBC 0.00 0.00 - 0.01 K/uL    NEUTROPHILS 63 32 - 75 %    LYMPHOCYTES 28 12 - 49 %    MONOCYTES 8 5 - 13 %    EOSINOPHILS 1 0 - 7 %    BASOPHILS 0 0 - 1 %    IMMATURE GRANULOCYTES 0 0.0 - 0.5 %    ABS. NEUTROPHILS 7.2 1.8 - 8.0 K/UL    ABS. LYMPHOCYTES 3.3 0.8 - 3.5 K/UL    ABS. MONOCYTES 1.0 0.0 - 1.0 K/UL    ABS. EOSINOPHILS 0.2 0.0 - 0.4 K/UL    ABS. BASOPHILS 0.0 0.0 - 0.1 K/UL    ABS. IMM. GRANS. 0.0 0.00 - 0.04 K/UL    DF AUTOMATED         Data Review images and reports reviewed    Assessment:     Principal Problem:    Duodenal ulcer with perforation ([facility] [address] (8/13/2020)        Plan/Recommendations/Medical Decision Making:     Continue present treatment for contained duodenal ulcer perforation  S/p IR aspiration fluid collection yesterday, appeared to be old blood. No organisms seen on gram stain. Cultures pending. Continue ngt today, PPI q12. H pylori serology pending. Jose Wallace.  Yisroel Koyanagi, MD, [facility]

## 2020-08-15 LAB
ERYTHROCYTE [DISTWIDTH] IN BLOOD BY AUTOMATED COUNT: 15.2 % (ref 11.5–14.5)
HCT VFR BLD AUTO: 54.1 % (ref 36.6–50.3)
HGB BLD-MCNC: 16.1 G/DL (ref 12.1–17)
MCH RBC QN AUTO: 27.6 PG (ref 26–34)
MCHC RBC AUTO-ENTMCNC: 29.8 G/DL (ref 30–36.5)
MCV RBC AUTO: 92.6 FL (ref 80–99)
NRBC # BLD: 0 K/UL (ref 0–0.01)
NRBC BLD-RTO: 0 PER 100 WBC
PLATELET # BLD AUTO: 266 K/UL (ref 150–400)
PMV BLD AUTO: 9.8 FL (ref 8.9–12.9)
RBC # BLD AUTO: 5.84 M/UL (ref 4.1–5.7)
WBC # BLD AUTO: 11.1 K/UL (ref 4.1–11.1)

## 2020-08-15 PROCEDURE — 65270000029 HC RM PRIVATE

## 2020-08-15 PROCEDURE — 74011000250 HC RX REV CODE- 250: Performed by: SURGERY

## 2020-08-15 PROCEDURE — 36415 COLL VENOUS BLD VENIPUNCTURE: CPT

## 2020-08-15 PROCEDURE — C9113 INJ PANTOPRAZOLE SODIUM, VIA: HCPCS | Performed by: SURGERY

## 2020-08-15 PROCEDURE — 74011000258 HC RX REV CODE- 258: Performed by: SURGERY

## 2020-08-15 PROCEDURE — 74011250636 HC RX REV CODE- 250/636: Performed by: SURGERY

## 2020-08-15 PROCEDURE — 99231 SBSQ HOSP IP/OBS SF/LOW 25: CPT | Performed by: SURGERY

## 2020-08-15 PROCEDURE — 85027 COMPLETE CBC AUTOMATED: CPT

## 2020-08-15 RX ADMIN — SODIUM CHLORIDE 40 MG: 9 INJECTION, SOLUTION INTRAMUSCULAR; INTRAVENOUS; SUBCUTANEOUS at 09:15

## 2020-08-15 RX ADMIN — PIPERACILLIN AND TAZOBACTAM 3.38 G: 3; .375 INJECTION, POWDER, LYOPHILIZED, FOR SOLUTION INTRAVENOUS at 02:06

## 2020-08-15 RX ADMIN — HYDROMORPHONE HYDROCHLORIDE 0.5 MG: 1 INJECTION, SOLUTION INTRAMUSCULAR; INTRAVENOUS; SUBCUTANEOUS at 14:49

## 2020-08-15 RX ADMIN — SODIUM CHLORIDE 125 ML/HR: 900 INJECTION, SOLUTION INTRAVENOUS at 13:41

## 2020-08-15 RX ADMIN — HYDROMORPHONE HYDROCHLORIDE 0.5 MG: 1 INJECTION, SOLUTION INTRAMUSCULAR; INTRAVENOUS; SUBCUTANEOUS at 02:13

## 2020-08-15 RX ADMIN — HYDROMORPHONE HYDROCHLORIDE 0.5 MG: 1 INJECTION, SOLUTION INTRAMUSCULAR; INTRAVENOUS; SUBCUTANEOUS at 20:07

## 2020-08-15 RX ADMIN — HYDROMORPHONE HYDROCHLORIDE 0.5 MG: 1 INJECTION, SOLUTION INTRAMUSCULAR; INTRAVENOUS; SUBCUTANEOUS at 04:43

## 2020-08-15 RX ADMIN — HYDROMORPHONE HYDROCHLORIDE 0.5 MG: 1 INJECTION, SOLUTION INTRAMUSCULAR; INTRAVENOUS; SUBCUTANEOUS at 08:07

## 2020-08-15 RX ADMIN — SODIUM CHLORIDE 40 MG: 9 INJECTION, SOLUTION INTRAMUSCULAR; INTRAVENOUS; SUBCUTANEOUS at 21:00

## 2020-08-15 RX ADMIN — PIPERACILLIN AND TAZOBACTAM 3.38 G: 3; .375 INJECTION, POWDER, LYOPHILIZED, FOR SOLUTION INTRAVENOUS at 19:34

## 2020-08-15 RX ADMIN — HYDROMORPHONE HYDROCHLORIDE 0.5 MG: 1 INJECTION, SOLUTION INTRAMUSCULAR; INTRAVENOUS; SUBCUTANEOUS at 11:53

## 2020-08-15 RX ADMIN — HYDROMORPHONE HYDROCHLORIDE 0.5 MG: 1 INJECTION, SOLUTION INTRAMUSCULAR; INTRAVENOUS; SUBCUTANEOUS at 23:47

## 2020-08-15 RX ADMIN — SODIUM CHLORIDE 125 ML/HR: 900 INJECTION, SOLUTION INTRAVENOUS at 01:58

## 2020-08-15 RX ADMIN — SODIUM CHLORIDE 125 ML/HR: 900 INJECTION, SOLUTION INTRAVENOUS at 23:42

## 2020-08-15 RX ADMIN — PIPERACILLIN AND TAZOBACTAM 3.38 G: 3; .375 INJECTION, POWDER, LYOPHILIZED, FOR SOLUTION INTRAVENOUS at 11:50

## 2020-08-15 NOTE — PROGRESS NOTES
General Surgery End of Shift Nursing Note    Bedside shift change report given to Aneudy Gaffney RN (oncoming nurse) by Moses Gonzalez RN (offgoing nurse). Report included the following information SBAR, Kardex, Intake/Output, Recent Results and Quality Measures. Shift worked:  [id number]   Summary of shift:    Patient afebrile and vitals stable. NG tube in place with 700 mL out overnight. IV dilaudid per [facility] for pain. Patient voiding per urinal without issue. IV antibiotics per MAR. Issues for physician to address:        Number times ambulated in hallway past shift: 0    Number of times OOB to chair past shift: 0    Pain Management:  Current medication: Dilaudid  Patient states pain is manageable on current pain medication: YES    GI:    Current diet:  DIET NPO Except Meds    Tolerating current diet: YES  Passing flatus: YES  Last Bowel Movement: several days ago   Appearance:     Respiratory:    Incentive Spirometer at bedside: YES  Patient instructed on use: YES    Patient Safety:    Falls Score: 1  Bed Alarm On? No  Sitter?  No    Epimenio Pour

## 2020-08-15 NOTE — PROGRESS NOTES
Post NGT clamp residual is 20 ml. Per order NGT d/c and Dr. Joseph Black will be notified for plan of care.

## 2020-08-15 NOTE — PROGRESS NOTES
Problem: Pressure Injury - Risk of  Goal: *Prevention of pressure injury  Description: Document Cristiano Scale and appropriate interventions in the flowsheet. Outcome: Progressing Towards Goal  Note: Pressure Injury Interventions:       Moisture Interventions: Absorbent underpads, Apply protective barrier, creams and emollients, Assess need for specialty bed, Minimize layers, Maintain skin hydration (lotion/cream)    Activity Interventions: Increase time out of bed, Pressure redistribution bed/mattress(bed type), PT/OT evaluation         Nutrition Interventions: (NPO)                     Problem: Patient Education: Go to Patient Education Activity  Goal: Patient/Family Education  Outcome: Progressing Towards Goal     Problem: Pain  Goal: *Control of Pain  Outcome: Progressing Towards Goal     Problem: Patient Education: Go to Patient Education Activity  Goal: Patient/Family Education  Outcome: Progressing Towards Goal     Problem: Falls - Risk of  Goal: *Absence of Falls  Description: Document Jez Fall Risk and appropriate interventions in the flowsheet.   Outcome: Progressing Towards Goal  Note: Fall Risk Interventions:            Medication Interventions: Patient to call before getting OOB                   Problem: Patient Education: Go to Patient Education Activity  Goal: Patient/Family Education  Outcome: Progressing Towards Goal

## 2020-08-15 NOTE — PROGRESS NOTES
1030 - NG tube clamped per order for 4 hours  1430 - Post NGT clamp residual is 20 ml. Per order NGT d/c and Dr. Muriel Ventura will be notified for plan of care. General Surgery End of Shift Nursing Note    Bedside shift change report given to Raghavendra Garcia RN (oncoming nurse) by YOVANY Cooper (offgoing nurse). Report included the following information SBAR, Kardex and MAR. Shift worked:   8948-1405   Summary of shift:  Pt's NG tube was discontinued. Pt is on clear liquid diet and is tolerating it well. Pt showered. He asked for pain medication three times this shift. Nursing utilized  phone once this shift for questions. Issues for physician to address:   None at this time     Number times ambulated in hallway past shift: 0    Number of times OOB to chair past shift: Up ad pardeep in room    Pain Management:  Current medication: See MAR  Patient states pain is manageable on current pain medication: NO    GI:    Current diet:  DIET CLEAR LIQUID    Tolerating current diet: YES  Passing flatus: NO  Last Bowel Movement: Pt reports prior to hospital admission   Appearance: unknown    Respiratory:    Head of bed elevated    Patient Safety:    Falls Score: 1  Bed Alarm On? No  Sitter?  No    Myna Dole

## 2020-08-15 NOTE — PROGRESS NOTES
SURGERY PROGRESS NOTE      Admit Date: 2020    POD * No surgery found *    Procedure: * No surgery found *      Subjective:     Patient denies nausea and wants to eat.   He denies abdominal pain as well      Objective:     Visit Vitals  /76   Pulse 67   Temp 98.1 °F (36.7 °C)   Resp 15   Ht 5' 11\" (1.803 m)   Wt 60.8 kg (134 lb)   SpO2 96%   BMI 18.69 kg/m²        Temp (24hrs), Av.7 °F (36.5 °C), Min:97.2 °F (36.2 °C), Max:98.2 °F (36.8 °C)      08/15 0701 - 08/15 1900  In: 135.4 [I.V.:135.4]  Out: 0   1901 - 08/15 0700  In: 3220.8 [I.V.:3220.8]  Out: 2800 [Urine:1600]    Physical Exam:    General:  alert, cooperative, no distress, appears stated age   Abdomen: soft, bowel sounds active, non-tender           Lab Results   Component Value Date/Time    WBC 11.7 (H) 2020 02:25 AM    HGB 14.5 2020 02:25 AM    HCT 46.3 2020 02:25 AM    PLATELET 057  02:25 AM    MCV 86.9 2020 02:25 AM     Lab Results   Component Value Date/Time    GFR est non-AA >60 2020 02:25 AM    GFR est AA >60 2020 02:25 AM    Creatinine 0.83 2020 02:25 AM    BUN 7 2020 02:25 AM    Sodium 141 2020 02:25 AM    Potassium 3.7 2020 02:25 AM    Chloride 109 (H) 2020 02:25 AM    CO2 29 2020 02:25 AM       Assessment:     Principal Problem:    Duodenal ulcer with perforation ([facility] [address] (2020)      Doing well  Plan:       Clamp NG, if residual low will D/C NG and start clears

## 2020-08-16 LAB
ANION GAP SERPL CALC-SCNC: 3 MMOL/L (ref 5–15)
BUN SERPL-MCNC: 7 MG/DL (ref 6–20)
BUN/CREAT SERPL: 9 (ref 12–20)
CALCIUM SERPL-MCNC: 8.6 MG/DL (ref 8.5–10.1)
CHLORIDE SERPL-SCNC: 108 MMOL/L (ref 97–108)
CO2 SERPL-SCNC: 29 MMOL/L (ref 21–32)
CREAT SERPL-MCNC: 0.8 MG/DL (ref 0.7–1.3)
ERYTHROCYTE [DISTWIDTH] IN BLOOD BY AUTOMATED COUNT: 14.5 % (ref 11.5–14.5)
GLUCOSE SERPL-MCNC: 76 MG/DL (ref 65–100)
HCT VFR BLD AUTO: 47.1 % (ref 36.6–50.3)
HGB BLD-MCNC: 14.8 G/DL (ref 12.1–17)
LIPASE SERPL-CCNC: 244 U/L (ref 73–393)
MCH RBC QN AUTO: 27.2 PG (ref 26–34)
MCHC RBC AUTO-ENTMCNC: 31.4 G/DL (ref 30–36.5)
MCV RBC AUTO: 86.4 FL (ref 80–99)
NRBC # BLD: 0 K/UL (ref 0–0.01)
NRBC BLD-RTO: 0 PER 100 WBC
PLATELET # BLD AUTO: 297 K/UL (ref 150–400)
PMV BLD AUTO: 9.6 FL (ref 8.9–12.9)
POTASSIUM SERPL-SCNC: 4.2 MMOL/L (ref 3.5–5.1)
RBC # BLD AUTO: 5.45 M/UL (ref 4.1–5.7)
SODIUM SERPL-SCNC: 140 MMOL/L (ref 136–145)
WBC # BLD AUTO: 10.8 K/UL (ref 4.1–11.1)

## 2020-08-16 PROCEDURE — 36415 COLL VENOUS BLD VENIPUNCTURE: CPT

## 2020-08-16 PROCEDURE — 99231 SBSQ HOSP IP/OBS SF/LOW 25: CPT | Performed by: SURGERY

## 2020-08-16 PROCEDURE — 80048 BASIC METABOLIC PNL TOTAL CA: CPT

## 2020-08-16 PROCEDURE — 85027 COMPLETE CBC AUTOMATED: CPT

## 2020-08-16 PROCEDURE — C9113 INJ PANTOPRAZOLE SODIUM, VIA: HCPCS | Performed by: SURGERY

## 2020-08-16 PROCEDURE — 65270000029 HC RM PRIVATE

## 2020-08-16 PROCEDURE — 74011000250 HC RX REV CODE- 250: Performed by: SURGERY

## 2020-08-16 PROCEDURE — 74011250636 HC RX REV CODE- 250/636: Performed by: SURGERY

## 2020-08-16 PROCEDURE — 74011000258 HC RX REV CODE- 258: Performed by: SURGERY

## 2020-08-16 PROCEDURE — 83690 ASSAY OF LIPASE: CPT

## 2020-08-16 RX ADMIN — PIPERACILLIN AND TAZOBACTAM 3.38 G: 3; .375 INJECTION, POWDER, LYOPHILIZED, FOR SOLUTION INTRAVENOUS at 18:55

## 2020-08-16 RX ADMIN — HYDROMORPHONE HYDROCHLORIDE 0.5 MG: 1 INJECTION, SOLUTION INTRAMUSCULAR; INTRAVENOUS; SUBCUTANEOUS at 21:05

## 2020-08-16 RX ADMIN — PIPERACILLIN AND TAZOBACTAM 3.38 G: 3; .375 INJECTION, POWDER, LYOPHILIZED, FOR SOLUTION INTRAVENOUS at 10:57

## 2020-08-16 RX ADMIN — SODIUM CHLORIDE 40 MG: 9 INJECTION, SOLUTION INTRAMUSCULAR; INTRAVENOUS; SUBCUTANEOUS at 21:05

## 2020-08-16 RX ADMIN — HYDROMORPHONE HYDROCHLORIDE 0.5 MG: 1 INJECTION, SOLUTION INTRAMUSCULAR; INTRAVENOUS; SUBCUTANEOUS at 02:42

## 2020-08-16 RX ADMIN — HYDROMORPHONE HYDROCHLORIDE 0.5 MG: 1 INJECTION, SOLUTION INTRAMUSCULAR; INTRAVENOUS; SUBCUTANEOUS at 17:29

## 2020-08-16 RX ADMIN — SODIUM CHLORIDE 40 MG: 9 INJECTION, SOLUTION INTRAMUSCULAR; INTRAVENOUS; SUBCUTANEOUS at 08:59

## 2020-08-16 RX ADMIN — HYDROMORPHONE HYDROCHLORIDE 0.5 MG: 1 INJECTION, SOLUTION INTRAMUSCULAR; INTRAVENOUS; SUBCUTANEOUS at 12:23

## 2020-08-16 RX ADMIN — SODIUM CHLORIDE 125 ML/HR: 900 INJECTION, SOLUTION INTRAVENOUS at 19:00

## 2020-08-16 RX ADMIN — HYDROMORPHONE HYDROCHLORIDE 0.5 MG: 1 INJECTION, SOLUTION INTRAMUSCULAR; INTRAVENOUS; SUBCUTANEOUS at 05:27

## 2020-08-16 RX ADMIN — PIPERACILLIN AND TAZOBACTAM 3.38 G: 3; .375 INJECTION, POWDER, LYOPHILIZED, FOR SOLUTION INTRAVENOUS at 02:45

## 2020-08-16 RX ADMIN — SODIUM CHLORIDE 125 ML/HR: 900 INJECTION, SOLUTION INTRAVENOUS at 11:01

## 2020-08-16 RX ADMIN — HYDROMORPHONE HYDROCHLORIDE 0.5 MG: 1 INJECTION, SOLUTION INTRAMUSCULAR; INTRAVENOUS; SUBCUTANEOUS at 09:35

## 2020-08-16 NOTE — PROGRESS NOTES
Problem: Pressure Injury - Risk of  Goal: *Prevention of pressure injury  Description: Document Cristiano Scale and appropriate interventions in the flowsheet. Outcome: Progressing Towards Goal  Note: Pressure Injury Interventions:       Moisture Interventions: Maintain skin hydration (lotion/cream), Minimize layers, Moisture barrier    Activity Interventions: Increase time out of bed, Pressure redistribution bed/mattress(bed type), PT/OT evaluation         Nutrition Interventions: (NPO)                     Problem: Patient Education: Go to Patient Education Activity  Goal: Patient/Family Education  Outcome: Progressing Towards Goal     Problem: Pain  Goal: *Control of Pain  Outcome: Progressing Towards Goal     Problem: Patient Education: Go to Patient Education Activity  Goal: Patient/Family Education  Outcome: Progressing Towards Goal     Problem: Falls - Risk of  Goal: *Absence of Falls  Description: Document Jez Fall Risk and appropriate interventions in the flowsheet.   Outcome: Progressing Towards Goal  Note: Fall Risk Interventions:            Medication Interventions: Evaluate medications/consider consulting pharmacy, Patient to call before getting OOB                   Problem: Patient Education: Go to Patient Education Activity  Goal: Patient/Family Education  Outcome: Progressing Towards Goal

## 2020-08-16 NOTE — PROGRESS NOTES
General Surgery End of Shift Nursing Note    Bedside shift change report given to Nila Ontiveros RN (oncoming nurse) by Wendy White RN (offgoing nurse). Report included the following information SBAR, Kardex and MAR. Shift worked:   [id number]     Summary of shift:    Pt resting in bed with his wife visiting. He has 2 bowel movements this shift. Pt is up ad pardeep. He has been requesting PRN pain medication for abdominal pain. Issues for physician to address:   None at this time     Number times ambulated in hallway past shift: 0    Number of times OOB to chair past shift: up ad pardeep    Pain Management:  Current medication: See MAR  Patient states pain is manageable on current pain medication: NO    GI:    Current diet:  DIET NUTRITIONAL SUPPLEMENTS All Meals; Ensure Clear  DIET FULL LIQUID    Tolerating current diet: YES  Passing flatus: YES  Last Bowel Movement: today   Appearance: watery, brown    Respiratory:    Head of bed elevated    Patient Safety:    Falls Score: 1  Bed Alarm On? No  Sitter?  No    Annabelley Pickalejandro

## 2020-08-16 NOTE — PROGRESS NOTES
SURGERY PROGRESS NOTE      Admit Date: 2020      Subjective:     Patient has some epigastric pain. Denies nausea. Tolerating liquids        Objective:     Visit Vitals  /68   Pulse 64   Temp 98 °F (36.7 °C)   Resp 17   Ht 5' 11\" (1.803 m)   Wt 60.8 kg (134 lb)   SpO2 98%   BMI 18.69 kg/m²        Temp (24hrs), Av.1 °F (36.7 °C), Min:97.8 °F (36.6 °C), Max:98.6 °F (37 °C)      701 - 1900  In: 365 [P.O.:240; I.V.:125]  Out: -   1901 -  0700  In: 5075.7 [P.O.:120;  I.V.:4955.7]  Out: 3600 [Urine:2900]    Physical Exam:    General:  alert, cooperative, no distress, appears stated age   Abdomen: soft, bowel sounds active, non-tender           Lab Results   Component Value Date/Time    WBC 10.8 2020 03:01 AM    HGB 14.8 2020 03:01 AM    HCT 47.1 2020 03:01 AM    PLATELET 322  03:01 AM    MCV 86.4 2020 03:01 AM     Lab Results   Component Value Date/Time    GFR est non-AA >60 2020 03:01 AM    GFR est AA >60 2020 03:01 AM    Creatinine 0.80 2020 03:01 AM    BUN 7 2020 03:01 AM    Sodium 140 2020 03:01 AM    Potassium 4.2 2020 03:01 AM    Chloride 108 2020 03:01 AM    CO2 29 2020 03:01 AM       Assessment:     Principal Problem:    Duodenal ulcer with perforation ([facility] [address] (2020)    doing well    Plan:       Full liquid diet  Interval CT tomorrow

## 2020-08-16 NOTE — PROGRESS NOTES
General Surgery End of Shift Nursing Note    Bedside shift change report given to Marie Park RN (oncoming nurse) by Issac Ulrich RN (offgoing nurse). Report included the following information SBAR, Kardex, Intake/Output, Recent Results and Quality Measures. Shift worked:  3381-0985   Summary of shift:    Patient afebrile and vitals stable. Continued  IV dilaudid per [facility] for pain. Patient voiding per urinal without issue. IV antibiotics per MAR. Issues for physician to address:        Number times ambulated in hallway past shift: 0    Number of times OOB to chair past shift: 0    Pain Management:  Current medication: Dilaudid  Patient states pain is manageable on current pain medication: YES    GI:    Current diet:  DIET CLEAR LIQUID  DIET NUTRITIONAL SUPPLEMENTS All Meals; Ensure Clear    Tolerating current diet: YES  Passing flatus: YES  Last Bowel Movement: several days ago   Appearance:     Respiratory:    Incentive Spirometer at bedside: YES  Patient instructed on use: YES    Patient Safety:    Falls Score: 1  Bed Alarm On? No  Sitter?  No    Villa Wong

## 2020-08-17 ENCOUNTER — APPOINTMENT (OUTPATIENT)
Dept: CT IMAGING | Age: 60
DRG: 241 | End: 2020-08-17
Attending: SURGERY
Payer: MEDICAID

## 2020-08-17 LAB
ANION GAP SERPL CALC-SCNC: 3 MMOL/L (ref 5–15)
BACTERIA SPEC CULT: NORMAL
BUN SERPL-MCNC: 3 MG/DL (ref 6–20)
BUN/CREAT SERPL: 4 (ref 12–20)
CALCIUM SERPL-MCNC: 8.4 MG/DL (ref 8.5–10.1)
CHLORIDE SERPL-SCNC: 106 MMOL/L (ref 97–108)
CO2 SERPL-SCNC: 31 MMOL/L (ref 21–32)
CREAT SERPL-MCNC: 0.82 MG/DL (ref 0.7–1.3)
ERYTHROCYTE [DISTWIDTH] IN BLOOD BY AUTOMATED COUNT: 14.5 % (ref 11.5–14.5)
GLUCOSE SERPL-MCNC: 101 MG/DL (ref 65–100)
GRAM STN SPEC: NORMAL
GRAM STN SPEC: NORMAL
HCT VFR BLD AUTO: 45.8 % (ref 36.6–50.3)
HGB BLD-MCNC: 14.7 G/DL (ref 12.1–17)
MCH RBC QN AUTO: 27.3 PG (ref 26–34)
MCHC RBC AUTO-ENTMCNC: 32.1 G/DL (ref 30–36.5)
MCV RBC AUTO: 85 FL (ref 80–99)
NRBC # BLD: 0 K/UL (ref 0–0.01)
NRBC BLD-RTO: 0 PER 100 WBC
PLATELET # BLD AUTO: 295 K/UL (ref 150–400)
PMV BLD AUTO: 9.4 FL (ref 8.9–12.9)
POTASSIUM SERPL-SCNC: 3.6 MMOL/L (ref 3.5–5.1)
RBC # BLD AUTO: 5.39 M/UL (ref 4.1–5.7)
SERVICE CMNT-IMP: NORMAL
SERVICE CMNT-IMP: NORMAL
SODIUM SERPL-SCNC: 140 MMOL/L (ref 136–145)
WBC # BLD AUTO: 10 K/UL (ref 4.1–11.1)

## 2020-08-17 PROCEDURE — 36415 COLL VENOUS BLD VENIPUNCTURE: CPT

## 2020-08-17 PROCEDURE — 65270000029 HC RM PRIVATE

## 2020-08-17 PROCEDURE — 74011250636 HC RX REV CODE- 250/636: Performed by: SURGERY

## 2020-08-17 PROCEDURE — 74011000258 HC RX REV CODE- 258: Performed by: SURGERY

## 2020-08-17 PROCEDURE — 80048 BASIC METABOLIC PNL TOTAL CA: CPT

## 2020-08-17 PROCEDURE — 74011000255 HC RX REV CODE- 255: Performed by: SURGERY

## 2020-08-17 PROCEDURE — 74011000250 HC RX REV CODE- 250: Performed by: SURGERY

## 2020-08-17 PROCEDURE — 74177 CT ABD & PELVIS W/CONTRAST: CPT

## 2020-08-17 PROCEDURE — 85027 COMPLETE CBC AUTOMATED: CPT

## 2020-08-17 PROCEDURE — 74011636320 HC RX REV CODE- 636/320: Performed by: SURGERY

## 2020-08-17 PROCEDURE — 99231 SBSQ HOSP IP/OBS SF/LOW 25: CPT | Performed by: SURGERY

## 2020-08-17 PROCEDURE — C9113 INJ PANTOPRAZOLE SODIUM, VIA: HCPCS | Performed by: SURGERY

## 2020-08-17 RX ORDER — BARIUM SULFATE 20 MG/ML
900 SUSPENSION ORAL
Status: COMPLETED | OUTPATIENT
Start: 2020-08-17 | End: 2020-08-17

## 2020-08-17 RX ORDER — SODIUM CHLORIDE 0.9 % (FLUSH) 0.9 %
10 SYRINGE (ML) INJECTION
Status: COMPLETED | OUTPATIENT
Start: 2020-08-17 | End: 2020-08-17

## 2020-08-17 RX ADMIN — PIPERACILLIN AND TAZOBACTAM 3.38 G: 3; .375 INJECTION, POWDER, LYOPHILIZED, FOR SOLUTION INTRAVENOUS at 18:02

## 2020-08-17 RX ADMIN — SODIUM CHLORIDE 125 ML/HR: 900 INJECTION, SOLUTION INTRAVENOUS at 23:55

## 2020-08-17 RX ADMIN — BARIUM SULFATE 900 ML: 20 SUSPENSION ORAL at 10:34

## 2020-08-17 RX ADMIN — HYDROMORPHONE HYDROCHLORIDE 0.5 MG: 1 INJECTION, SOLUTION INTRAMUSCULAR; INTRAVENOUS; SUBCUTANEOUS at 08:17

## 2020-08-17 RX ADMIN — HYDROMORPHONE HYDROCHLORIDE 0.5 MG: 1 INJECTION, SOLUTION INTRAMUSCULAR; INTRAVENOUS; SUBCUTANEOUS at 10:55

## 2020-08-17 RX ADMIN — IOPAMIDOL 100 ML: 755 INJECTION, SOLUTION INTRAVENOUS at 12:12

## 2020-08-17 RX ADMIN — Medication 10 ML: at 12:12

## 2020-08-17 RX ADMIN — SODIUM CHLORIDE 40 MG: 9 INJECTION, SOLUTION INTRAMUSCULAR; INTRAVENOUS; SUBCUTANEOUS at 21:21

## 2020-08-17 RX ADMIN — SODIUM CHLORIDE 125 ML/HR: 900 INJECTION, SOLUTION INTRAVENOUS at 03:50

## 2020-08-17 RX ADMIN — HYDROMORPHONE HYDROCHLORIDE 0.5 MG: 1 INJECTION, SOLUTION INTRAMUSCULAR; INTRAVENOUS; SUBCUTANEOUS at 20:21

## 2020-08-17 RX ADMIN — HYDROMORPHONE HYDROCHLORIDE 0.5 MG: 1 INJECTION, SOLUTION INTRAMUSCULAR; INTRAVENOUS; SUBCUTANEOUS at 03:51

## 2020-08-17 RX ADMIN — SODIUM CHLORIDE 40 MG: 9 INJECTION, SOLUTION INTRAMUSCULAR; INTRAVENOUS; SUBCUTANEOUS at 10:55

## 2020-08-17 RX ADMIN — PIPERACILLIN AND TAZOBACTAM 3.38 G: 3; .375 INJECTION, POWDER, LYOPHILIZED, FOR SOLUTION INTRAVENOUS at 03:49

## 2020-08-17 RX ADMIN — HYDROMORPHONE HYDROCHLORIDE 0.5 MG: 1 INJECTION, SOLUTION INTRAMUSCULAR; INTRAVENOUS; SUBCUTANEOUS at 15:57

## 2020-08-17 RX ADMIN — PIPERACILLIN AND TAZOBACTAM 3.38 G: 3; .375 INJECTION, POWDER, LYOPHILIZED, FOR SOLUTION INTRAVENOUS at 10:36

## 2020-08-17 NOTE — PROGRESS NOTES
General Surgery End of Shift Nursing Note    Bedside shift change report given to Nicolás Mcdaniel  (oncoming nurse) by Arcenio Garcia (offgoing nurse). Report included the following information SBAR, Kardex and MAR. Shift worked:   7a-7p   Summary of shift:    Controlled pain with dilaudid q2 hours. Pt ambulated around room times 2, ambulated to bathroom. Tolerated diet well. Issues for physician to address:   none     Number times ambulated in hallway past shift: 0    Number of times OOB to chair past shift: 0    Pain Management:  Current medication: see mar  Patient states pain is manageable on current pain medication: YES    GI:    Current diet:  DIET NUTRITIONAL SUPPLEMENTS All Meals; Ensure Clear  DIET FULL LIQUID    Tolerating current diet: YES  Passing flatus: YES  Last Bowel Movement: yesterday   Appearance: unknown    Respiratory:    Incentive Spirometer at bedside: YES  Patient instructed on use: YES    Patient Safety:    Falls Score: 1  Bed Alarm On? No  Sitter?  No    Rita Kinsey

## 2020-08-17 NOTE — PROGRESS NOTES
Comprehensive Nutrition Assessment    Type and Reason for Visit: Initial, Positive nutrition screen    Nutrition Recommendations/Plan:   · Continue diet advancement as tolerated per surgery. · Continue Ensure Clear with meals. Nutrition Assessment:     8/17:  Chart reviewed; med noted for duodenal ulcer with perforation. Hx of pancreatic mass with cholecystectomy. Pt is currently tolerating full liquids consuming % of meals. Requests more solids. Plans are a CT of abdomen/pelvis. Patient Vitals for the past 72 hrs:   % Diet Eaten   08/17/20 0834 (P) 100 %   08/17/20 0830 90 %   08/16/20 1426 100 %   08/16/20 0905 100 %   08/15/20 1805 100 %   08/15/20 1547 100 %   08/15/20 0800 0 %   08/14/20 1515 0 %     Last Weight Metric  Weight Loss Metrics 8/13/2020   Today's Wt 134 lb   BMI 18.69 kg/m2     Estimated Daily Nutrient Needs:  Energy (kcal):  1889 (BMR 1453) x 1.3AF  Protein (g):  91 (1.5 g/kg bw)       Fluid (ml/day):  1900 ml/day    Nutrition Related Findings:  Labs: stable, BM: 8/16, Meds: protonix, IVF, dilaudid    Wounds:    None       Current Nutrition Therapies:   DIET NUTRITIONAL SUPPLEMENTS All Meals; Ensure Clear  DIET FULL LIQUID    Anthropometric Measures:  · Height:  5' 11\" (180.3 cm)  · Current Body Wt:  60.8 kg (134 lb 0.6 oz)    · Ideal Body Wt:  172 lbs:  77.9 %    · BMI Category:  Normal weight (BMI 18.5-24. 9)       Nutrition Diagnosis:   · Inadequate protein-energy intake related to (duodenal ulcer + perf) as evidenced by (slow diet adv (full liquids) + need for ONS)    Nutrition Interventions:   Food and/or Nutrient Delivery: Continue current diet, Continue oral nutrition supplement  Nutrition Education and Counseling: No recommendations at this time  Coordination of Nutrition Care: No recommendation at this time    Goals:  PO intake continue >75% of meals + consume 240 ml ONS next 2-4 days       Nutrition Monitoring and Evaluation:   Food/Nutrient Intake Outcomes: Diet advancement/tolerance, Food and nutrient intake, Supplement intake  Physical Signs/Symptoms Outcomes: Biochemical data, GI status, Weight    Discharge Planning:     Too soon to determine     Electronically signed by Bibiana Hays RD on 8/17/2020 at 11:37 AM

## 2020-08-17 NOTE — PROGRESS NOTES
Problem: Pressure Injury - Risk of  Goal: *Prevention of pressure injury  Description: Document Cristiano Scale and appropriate interventions in the flowsheet. Outcome: Progressing Towards Goal  Note: Pressure Injury Interventions:       Moisture Interventions: Maintain skin hydration (lotion/cream), Minimize layers, Moisture barrier    Activity Interventions: Increase time out of bed, Pressure redistribution bed/mattress(bed type), PT/OT evaluation         Nutrition Interventions: Document food/fluid/supplement intake                     Problem: Patient Education: Go to Patient Education Activity  Goal: Patient/Family Education  Outcome: Progressing Towards Goal     Problem: Pain  Goal: *Control of Pain  Outcome: Progressing Towards Goal     Problem: Patient Education: Go to Patient Education Activity  Goal: Patient/Family Education  Outcome: Progressing Towards Goal     Problem: Falls - Risk of  Goal: *Absence of Falls  Description: Document Jez Fall Risk and appropriate interventions in the flowsheet.   Outcome: Progressing Towards Goal  Note: Fall Risk Interventions:            Medication Interventions: Patient to call before getting OOB                   Problem: Patient Education: Go to Patient Education Activity  Goal: Patient/Family Education  Outcome: Progressing Towards Goal

## 2020-08-17 NOTE — PROGRESS NOTES
SURGERY PROGRESS NOTE      Admit Date: 2020      Subjective:     Patient state he has epigastric pain. Tolerating PO and requesting more solid food. Passing flatus      Objective:     Visit Vitals  /80   Pulse 60   Temp 97.7 °F (36.5 °C)   Resp 16   Ht 5' 11\" (1.803 m)   Wt 60.8 kg (134 lb)   SpO2 100%   BMI 18.69 kg/m²        Temp (24hrs), Av.9 °F (36.6 °C), Min:97.6 °F (36.4 °C), Max:98.3 °F (36.8 °C)      701 -  190  In: 660 [P.O.:660]  Out: 600 [Urine:600]  08/15 1901 -  0700  In: 5624.4 [P.O.:600; I.V.:5024.4]  Out: 2250 [Urine:2250]    Physical Exam:    General:  alert, cooperative, no distress, appears stated age   Abdomen: soft, bowel sounds active, non-tender           Lab Results   Component Value Date/Time    WBC 10.0 2020 03:23 AM    HGB 14.7 2020 03:23 AM    HCT 45.8 2020 03:23 AM    PLATELET 627  03:23 AM    MCV 85.0 2020 03:23 AM     Lab Results   Component Value Date/Time    GFR est non-AA >60 2020 03:23 AM    GFR est AA >60 2020 03:23 AM    Creatinine 0.82 2020 03:23 AM    BUN 3 (L) 2020 03:23 AM    Sodium 140 2020 03:23 AM    Potassium 3.6 2020 03:23 AM    Chloride 106 2020 03:23 AM    CO2 31 2020 03:23 AM       Assessment:     Principal Problem:    Duodenal ulcer with perforation ([address] (2020)    Difficult to assess improvement based on patient's symptoms.   Will get CT to assess today    Plan:       CT abd/pelvis

## 2020-08-18 ENCOUNTER — APPOINTMENT (OUTPATIENT)
Dept: MRI IMAGING | Age: 60
DRG: 241 | End: 2020-08-18
Attending: SURGERY
Payer: MEDICAID

## 2020-08-18 LAB
ANION GAP SERPL CALC-SCNC: 1 MMOL/L (ref 5–15)
BUN SERPL-MCNC: 5 MG/DL (ref 6–20)
BUN/CREAT SERPL: 6 (ref 12–20)
CALCIUM SERPL-MCNC: 8.6 MG/DL (ref 8.5–10.1)
CHLORIDE SERPL-SCNC: 104 MMOL/L (ref 97–108)
CO2 SERPL-SCNC: 33 MMOL/L (ref 21–32)
CREAT SERPL-MCNC: 0.89 MG/DL (ref 0.7–1.3)
GLUCOSE SERPL-MCNC: 71 MG/DL (ref 65–100)
POTASSIUM SERPL-SCNC: 3.6 MMOL/L (ref 3.5–5.1)
SODIUM SERPL-SCNC: 138 MMOL/L (ref 136–145)

## 2020-08-18 PROCEDURE — 36415 COLL VENOUS BLD VENIPUNCTURE: CPT

## 2020-08-18 PROCEDURE — 74011250637 HC RX REV CODE- 250/637: Performed by: SURGERY

## 2020-08-18 PROCEDURE — 74011000250 HC RX REV CODE- 250: Performed by: SURGERY

## 2020-08-18 PROCEDURE — 74011000258 HC RX REV CODE- 258: Performed by: SURGERY

## 2020-08-18 PROCEDURE — 74011250636 HC RX REV CODE- 250/636: Performed by: SURGERY

## 2020-08-18 PROCEDURE — A9585 GADOBUTROL INJECTION: HCPCS | Performed by: SURGERY

## 2020-08-18 PROCEDURE — 74183 MRI ABD W/O CNTR FLWD CNTR: CPT

## 2020-08-18 PROCEDURE — 80048 BASIC METABOLIC PNL TOTAL CA: CPT

## 2020-08-18 PROCEDURE — C9113 INJ PANTOPRAZOLE SODIUM, VIA: HCPCS | Performed by: SURGERY

## 2020-08-18 PROCEDURE — 99231 SBSQ HOSP IP/OBS SF/LOW 25: CPT | Performed by: SURGERY

## 2020-08-18 PROCEDURE — 65270000029 HC RM PRIVATE

## 2020-08-18 RX ORDER — HYDROCODONE BITARTRATE AND ACETAMINOPHEN 5; 325 MG/1; MG/1
1 TABLET ORAL
Status: DISCONTINUED | OUTPATIENT
Start: 2020-08-18 | End: 2020-08-19 | Stop reason: HOSPADM

## 2020-08-18 RX ADMIN — HYDROMORPHONE HYDROCHLORIDE 0.5 MG: 1 INJECTION, SOLUTION INTRAMUSCULAR; INTRAVENOUS; SUBCUTANEOUS at 08:43

## 2020-08-18 RX ADMIN — HYDROCODONE BITARTRATE AND ACETAMINOPHEN 1 TABLET: 5; 325 TABLET ORAL at 15:42

## 2020-08-18 RX ADMIN — SODIUM CHLORIDE 40 MG: 9 INJECTION, SOLUTION INTRAMUSCULAR; INTRAVENOUS; SUBCUTANEOUS at 20:49

## 2020-08-18 RX ADMIN — PIPERACILLIN AND TAZOBACTAM 3.38 G: 3; .375 INJECTION, POWDER, LYOPHILIZED, FOR SOLUTION INTRAVENOUS at 20:57

## 2020-08-18 RX ADMIN — PIPERACILLIN AND TAZOBACTAM 3.38 G: 3; .375 INJECTION, POWDER, LYOPHILIZED, FOR SOLUTION INTRAVENOUS at 03:28

## 2020-08-18 RX ADMIN — HYDROMORPHONE HYDROCHLORIDE 0.5 MG: 1 INJECTION, SOLUTION INTRAMUSCULAR; INTRAVENOUS; SUBCUTANEOUS at 12:24

## 2020-08-18 RX ADMIN — HYDROCODONE BITARTRATE AND ACETAMINOPHEN 1 TABLET: 5; 325 TABLET ORAL at 21:00

## 2020-08-18 RX ADMIN — PIPERACILLIN AND TAZOBACTAM 3.38 G: 3; .375 INJECTION, POWDER, LYOPHILIZED, FOR SOLUTION INTRAVENOUS at 12:24

## 2020-08-18 RX ADMIN — SODIUM CHLORIDE 40 MG: 9 INJECTION, SOLUTION INTRAMUSCULAR; INTRAVENOUS; SUBCUTANEOUS at 08:43

## 2020-08-18 RX ADMIN — GADOBUTROL 6 ML: 604.72 INJECTION INTRAVENOUS at 19:42

## 2020-08-18 RX ADMIN — HYDROMORPHONE HYDROCHLORIDE 0.5 MG: 1 INJECTION, SOLUTION INTRAMUSCULAR; INTRAVENOUS; SUBCUTANEOUS at 00:05

## 2020-08-18 RX ADMIN — HYDROMORPHONE HYDROCHLORIDE 0.5 MG: 1 INJECTION, SOLUTION INTRAMUSCULAR; INTRAVENOUS; SUBCUTANEOUS at 04:54

## 2020-08-18 RX ADMIN — SODIUM CHLORIDE 125 ML/HR: 900 INJECTION, SOLUTION INTRAVENOUS at 12:24

## 2020-08-18 NOTE — PROGRESS NOTES
SURGERY PROGRESS NOTE      Admit Date: 2020      Subjective:     Patient states pain is mild and he is tolerating PO he wants to go home today      Objective:     Visit Vitals  /82   Pulse 67   Temp 97.6 °F (36.4 °C)   Resp 16   Ht 5' 11\" (1.803 m)   Wt 60.8 kg (134 lb)   SpO2 99%   BMI 18.69 kg/m²        Temp (24hrs), Av.8 °F (36.6 °C), Min:97.6 °F (36.4 °C), Max:98.1 °F (36.7 °C)       07 -  1900  In: 1278.8 [P.O.:360; I.V.:918.8]  Out: -    190 -  0700  In: 4607.9 [P.O.:860; I.V.:3747.9]  Out: 1700 [Urine:1700]    Physical Exam:    General:  alert, cooperative, no distress, appears stated age   Abdomen: soft, bowel sounds active, non-tender       Cultures - NGTD    Interval CT -  Intramural duodenal fluid collection is improved but, now there is suggestion of a periampulary mass      Assessment:     Principal Problem:    Duodenal ulcer with perforation ([address] (2020)    Intramural duodenal fluid collection on the medial aspect of the second portion of the duodenum that appears to be sterile. Given new finding of a possible mass and his elevated lipase on admission I wonder if this groove pancreatitis and not a contained perforation. Plan:       1.   MRI to better assess the pancreas    2.  GI consult for possible endoscopy and EUS

## 2020-08-18 NOTE — PROGRESS NOTES
Spiritual Care Assessment/Progress Note  [facility]      NAME: Mile Griffith      MRN: [id number]  AGE: 64 y.o. SEX: male  Jew Affiliation: Patient refused   Language: Upper sorbian     8/18/2020           Spiritual Assessment begun in [facility] GENERAL SURGERY through conversation with:         []Patient        [] Family    [] Friend(s)        Reason for Consult: Initial/Spiritual assessment, patient floor     Spiritual beliefs: (Please include comment if needed)     [] Identifies with a andrea tradition:         [] Supported by a andrea community:            [] Claims no spiritual orientation:           [] Seeking spiritual identity:                [] Adheres to an individual form of spirituality:           [x] Not able to assess:                           Identified resources for coping:      [] Prayer                               [] Music                  [] Guided Imagery     [] Family/friends                 [] Pet visits     [] Devotional reading                         [] Unknown     [] Other:                                              Interventions offered during this visit: (See comments for more details)                Plan of Care:     [] Support spiritual and/or cultural needs    [] Support AMD and/or advance care planning process      [] Support grieving process   [] Coordinate Rites and/or Rituals    [] Coordination with community clergy   [] No spiritual needs identified at this time   [] Detailed Plan of Care below (See Comments)  [] Make referral to Music Therapy  [] Make referral to Pet Therapy     [] Make referral to Addiction services  [] Make referral to [facility]  [] Make referral to Spiritual Care Partner  [] No future visits requested        [] Follow up visits as needed     Comments: Attempted to visit Mr Melissa Tamayo in room 2135 for initial spiritual assessment. Physician was in talking with patient and family and assessment could not be done at that time.   : Omar Gore.  Vale Bo; [facility], to contact [phone] call: [phone]

## 2020-08-18 NOTE — PROGRESS NOTES
General Surgery End of Shift Nursing Note    Bedside shift change report given to Sreekanth Love (oncoming nurse) by Remi Botello (offgoing nurse). Report included the following information SBAR, Kardex and MAR. Shift worked:   7a-7p   Summary of shift:    Pain control switched to oral: Norco. Ambulated around room and to bathroom. Pt went down for MRI 1830. Issues for physician to address:   none     Number times ambulated in hallway past shift: 0    Number of times OOB to chair past shift: 2    Pain Management:  Current medication: see mar  Patient states pain is manageable on current pain medication: YES    GI:    Current diet:  DIET NUTRITIONAL SUPPLEMENTS All Meals; Ensure Clear  DIET FULL LIQUID  DIET NPO    Tolerating current diet: YES  Passing flatus: YES  Last Bowel Movement: yesterday   Appearance: unknown    Respiratory:    Incentive Spirometer at bedside: YES  Patient instructed on use: YES    Patient Safety:    Falls Score: 1  Bed Alarm On? No  Sitter?  No    Kieran Ruff

## 2020-08-18 NOTE — PROGRESS NOTES

## 2020-08-18 NOTE — PROGRESS NOTES
General Surgery End of Shift Nursing Note    Shift worked:   1900 - 0700   Summary of shift:   L AC IV went bad, d/c'd and placed a new one in the R forearm. Patent and flushing. Patient required dilaudid q2hr. Ambulated to restroom with no abnormalities in gait. Issues for physician to address:   none     Number times ambulated in hallway past shift: 0, Room x2    Number of times OOB to chair past shift: 0    Pain Management:  Current medication: Dilaudid  Patient states pain is manageable on current pain medication: YES    GI:    Current diet:  DIET NUTRITIONAL SUPPLEMENTS All Meals; Ensure Clear  DIET FULL LIQUID    Tolerating current diet: YES  Passing flatus: YES  Last Bowel Movement: yesterday    Patient Safety:    Falls Score: 1  Bed Alarm On? No  Sitter?  No    Shanique Smiley LPN

## 2020-08-18 NOTE — PROGRESS NOTES
[id number] Note  MICHEAL Germain   for Dr. Parag Ramirez    Patient has been seen by [facility] in January, consult called into their office and passed along to CAITIE.       MICHEAL Germain  08/18/20  2:29 PM

## 2020-08-18 NOTE — CONSULTS
GI CONSULTATION NOTE  Radha Molina NP   [phone] NP in-hospital cell phone M-F until 4:30  After 5pm or on weekends, please call  for physician on call    NAME: Angelique Carter   :  1960   MRN:  [id number]   Attending:  Dr Tommy Meraz  Primary GI:  Dr Mary Rodrigues - Dr Araujo Peer managing here at [address] - pt wants to change primary due to moving to this area. Date/Time:  2020 4:28 PM  Assessment:   Abnormal CT scan with concern for pancreatic mass with hx of non-cancerous pancreatic cyst in 2019  Hx of ETOH abuse  Unlikely to be new ampullary mass with recent EUS 2019  Duodenal perforation with  Fluid collection - negative culture and Hpylori ab negative  Suspect this could just be secondary swelling from duodenal perforation - warrants further evaluation  - Presented to hospital on  with recurrent abdominal pain and vomiting, elevated lipase and abnormal CT scan  - Pt is now feeling better but still has some pain  - No nausea, vomiting or abdominal tenderness on exam  - Tolerating full liquid diet  - No more ETOH use in 2019  - Denies NSAID use  - CT: 20 : IMPRESSION: Wall thickening and enhancement of the second portion of the duodenum, with  adjacent 4.0 cm peripherally enhancing fluid collection; findings suggest contained perforation of a duodenal ulcer.  - CT: 20 : IMPRESSION: 1. Diminished duodenal intramural fluid. 2. Stable dilated bile and pancreatic ducts, difficult to exclude small ampullary mass. 3. No pneumoperitoneum or enteric contrast extravasation. 4. Atherosclerosis. Hx of cholecystectomy    Plan:   1. MRI/MRCP tonight - spoke to MRI they said they are waiting on check list - spoke to [place], [address] [place] [address] and she said she would complete it. 2. Pending MRI findings, may need EGD tomorrow vs follow up imaging outpatient  3. NPO after midnight pending MRI review  4. Analgesic PRN  5. Antiemetics PRN  6.  Continue ETOH abstinence    Thank you for consultation. Pt seen with Dr Arely García, plan formulated today, and discussed with patient. Dr Arely García to sign note tomorrow. Subjective:     HISTORY OF PRESENT ILLNESS:     Dayna Parker is an 64 y.o.  male from [facility] who we are asked to see for complaint of concern for new ampullary mass. Medical history noncancerous pancreatic cyst, cholecystectomy, and ETOH abuse. Pt presented on 8/13/20 for abdominal pain, nausea, and vomited. Pt was noted to have pancreatitis and duodenal perforation on CT scan. Pt has fluid collection from perforation removed with negative cultures. Neg H.Pylori antibody. Pt has been doing better since and tolerating a full liquid diet. States that overall he feels better but the pain medication still helps (IV works better the pills). No recent ETOh use, denies any clear change or cause of onset of recurrent symptoms. On follow up CT scan, pt was noted to have possible ampullary mass. Pt has had extensive work up in the past at [facility]. Hospitalization from 11/26/2019 discharged 12/6/2019  -CA-19-9: Resulted in 33. - IgG4 elevated  -Elevated lipase at that time  -CT scan showed 1.4 cm cyst in the pancreatic head  -Previous MRCP but no results able to review    12/3/2019 - EGD: medium hiatal hernia; localized moderate changes noted by congestion. EUS: Medium sized hiatal hernia. Mucosal changes in the duodenum. There is no sign of significant pathology in the CBD. Hyperechoic material consistent with sludge was visualized in the endosonographically in the gallbladder body. Pancreatic parenchymal abnormalities consistent hyperechoic strands and lobularity were noted in the genu of the pancreas, pancreatic body and pancreatic tail. Pancreatic parenchymal abnormalities consistent lobularity, hyperechoic and prominent hypoechoic eye area were noted in the pancreatic head. Fine-needle aspiration performed. Bx: Negative for malignant cells.   Numerous benign acinar cells present in aspiration and cell block possible neuroendocrine population and aspiration material.    Colonoscopy: 3/10/2020: Diverticulosis. Follow-up 10 years      History reviewed. No pertinent past medical history. Past Surgical History:   Procedure Laterality Date    HX CHOLECYSTECTOMY       Social History     Tobacco Use    Smoking status: Current Every Day Smoker   Substance Use Topics    Alcohol use: Yes     Comment: socially      History reviewed. No pertinent family history. No Known Allergies   Prior to Admission medications    Not on File       Patient Active Problem List   Diagnosis Code    Duodenal ulcer with perforation ([facility] K26.5       REVIEW OF SYSTEMS:    Constitutional: negative fever, negative chills, negative weight loss  Eyes:   negative visual changes  ENT:   negative sore throat, tongue or lip swelling   Respiratory:  negative cough, negative dyspnea  Cards:  negative for chest pain, palpitations, lower extremity edema  GI:   See HPI  :  negative for frequency, dysuria  Integument:  negative for rash and pruritus  Heme:  negative for easy bruising and gum/nose bleeding  Musculoskel: negative for myalgias,  back pain and muscle weakness  Neuro: negative for headaches, dizziness, vertigo  Psych:  negative for feelings of anxiety, depression     Pertinent Positives: abdominal pain but improving      Objective:   VITALS:    Visit Vitals  /82   Pulse 67   Temp 97.6 °F (36.4 °C)   Resp 16   Ht 5' 11\" (1.803 m)   Wt 60.8 kg (134 lb)   SpO2 99%   BMI 18.69 kg/m²       PHYSICAL EXAM:   General:          Alert, WD, WN, cooperative, no distress, appears stated age. Head:               Normocephalic, without obvious abnormality, atraumatic. Eyes:               Conjunctivae clear and pale, anicteric sclerae. Pupils are equal  Nose:               Nares normal. No drainage or sinus tenderness.   Throat:             Lips, mucosa, and tongue normal.  No Thrush  Neck: Supple, symmetrical,  no adenopathy  Back:               Symmetric,  No CVA tenderness. Lungs:             CTA bilaterally. No wheezing/rhonchi/rales. Chest wall:      No tenderness or deformity. No Accessory muscle use. Heart:              Regular rate and rhythm,  no murmur, rub or gallop. Abdomen:        Soft, non-tender. Not distended. Bowel sounds normal. No masses  Extremities:     Atraumatic, No cyanosis. No edema. Skin:                Texture, turgor normal. No rashes/lesions/jaundice  Lymph:            Cervical, supraclavicular normal.  Psych:             Good insight. Not depressed. Not anxious or agitated. Neurologic:      EOMs intact. No facial asymmetry. No aphasia or slurred speech. Normal                        strength, A/O X 3. LAB DATA REVIEWED:    Recent Results (from the past 24 hour(s))   METABOLIC PANEL, BASIC    Collection Time: 08/18/20  1:51 AM   Result Value Ref Range    Sodium 138 136 - 145 mmol/L    Potassium 3.6 3.5 - 5.1 mmol/L    Chloride 104 97 - 108 mmol/L    CO2 33 (H) 21 - 32 mmol/L    Anion gap 1 (L) 5 - 15 mmol/L    Glucose 71 65 - 100 mg/dL    BUN 5 (L) 6 - 20 MG/DL    Creatinine 0.89 0.70 - 1.30 MG/DL    BUN/Creatinine ratio 6 (L) 12 - 20      GFR est AA >60 >60 ml/min/1.73m2    GFR est non-AA >60 >60 ml/min/1.73m2    Calcium 8.6 8.5 - 10.1 MG/DL       IMAGING RESULTS:  I have personally reviewed the imaging reports    DATE: 8/13/20  EXAM: CT ABD PELV W CONT     INDICATION: severe abd pain      COMPARISON: None      CONTRAST: 100 mL of Isovue-370.     TECHNIQUE:   Following the uneventful intravenous administration of contrast, thin axial  images were obtained through the abdomen and pelvis. Coronal and sagittal  reconstructions were generated. Oral contrast was not administered.  CT dose  reduction was achieved through use of a standardized protocol tailored for this  examination and automatic exposure control for dose modulation.     FINDINGS:   LOWER THORAX: Bilateral dependent atelectasis. LIVER: No mass. BILIARY TREE: Contracted gallbladder. CBD is not dilated. SPLEEN: within normal limits. PANCREAS: No mass or ductal dilatation. ADRENALS: Unremarkable. KIDNEYS: No mass, calculus, or hydronephrosis. STOMACH: Unremarkable. SMALL BOWEL: Extensive wall thickening and enhancement in the first and second  portions of the duodenum. 4.0 x 1.8 cm peripherally enhancing fluid collection  along the medial wall of the second portion of the duodenum. COLON: No dilatation or wall thickening. APPENDIX: Normal.  PERITONEUM: No ascites or pneumoperitoneum. RETROPERITONEUM: No lymphadenopathy or aortic aneurysm. REPRODUCTIVE ORGANS: Unremarkable. URINARY BLADDER: No mass or calculus. BONES: No destructive bone lesion. ABDOMINAL WALL: No mass or hernia. ADDITIONAL COMMENTS: N/A     IMPRESSION  IMPRESSION:  Wall thickening and enhancement of the second portion of the duodenum, with  adjacent 4.0 cm peripherally enhancing fluid collection; findings suggest  contained perforation of a duodenal ulcer. -------------------------------------------------------------------    DATE: 8/16/20  INDICATION:  Duodenal ulcer with perforation, assess  interval change      EXAM: CT Abdomen and Pelvis is performed with 100 mL Isovue 370 contrast IV  without complication. Oral contrast was administered. CT dose reduction was  achieved through use of a standardized protocol tailored for this examination  and automatic exposure control for dose modulation.     COMPARISON: 8/13/2020.     FINDINGS:  Low-density second portion duodenal intramural fluid is diminished.     Duodenal bulb remains distended. There is no pneumoperitoneum or enteric  contrast extravasation.     There is no gastric or small bowel distention.     Bile ducts and pancreatic duct remain dilated--to the level of the ampulla. Difficult to exclude small ampullary mass.  Pancreas otherwise unremarkable.     Aorta is atherosclerotic, without aneurysm.     There is no significant adenopathy. Liver shows no mass. Spleen is unremarkable. Adrenal glands are normal in size. Kidneys show no mass or hydronephrosis.     The bladder is unremarkable. There is prostatomegaly. . There is no apparent  pelvic mass.     IMPRESSION  IMPRESSION:  1. Diminished duodenal intramural fluid. 2. Stable dilated bile and pancreatic ducts, difficult to exclude small  ampullary mass. 3. No pneumoperitoneum or enteric contrast extravasation. 4. Atherosclerosis. Total time spent with patient: 60 minutes ________________________________________________________________________  Care Plan discussed with:  Patient y   Family     [place]              Consultant:       CT  8/18/2020:  ________________________________________________________________________    ___________________________________________________  Consulting Provider:  Margarito Bustillo NP      8/18/2020  4:28 PM

## 2020-08-19 VITALS
DIASTOLIC BLOOD PRESSURE: 72 MMHG | BODY MASS INDEX: 18.76 KG/M2 | OXYGEN SATURATION: 100 % | HEART RATE: 60 BPM | RESPIRATION RATE: 16 BRPM | TEMPERATURE: 97.8 F | SYSTOLIC BLOOD PRESSURE: 163 MMHG | HEIGHT: 71 IN | WEIGHT: 134 LBS

## 2020-08-19 PROBLEM — K85.81 OTHER ACUTE PANCREATITIS WITH UNINFECTED NECROSIS: Status: ACTIVE | Noted: 2020-08-19

## 2020-08-19 PROCEDURE — 99238 HOSP IP/OBS DSCHRG MGMT 30/<: CPT | Performed by: SURGERY

## 2020-08-19 PROCEDURE — 74011250636 HC RX REV CODE- 250/636: Performed by: SURGERY

## 2020-08-19 PROCEDURE — 74011250637 HC RX REV CODE- 250/637: Performed by: SURGERY

## 2020-08-19 PROCEDURE — 74011000258 HC RX REV CODE- 258: Performed by: SURGERY

## 2020-08-19 PROCEDURE — C9113 INJ PANTOPRAZOLE SODIUM, VIA: HCPCS | Performed by: SURGERY

## 2020-08-19 PROCEDURE — 74011000250 HC RX REV CODE- 250: Performed by: SURGERY

## 2020-08-19 RX ORDER — HYDROCODONE BITARTRATE AND ACETAMINOPHEN 5; 325 MG/1; MG/1
1 TABLET ORAL
Qty: 12 TAB | Refills: 0 | Status: SHIPPED | OUTPATIENT
Start: 2020-08-19 | End: 2020-08-22

## 2020-08-19 RX ORDER — METRONIDAZOLE 500 MG/1
500 TABLET ORAL 3 TIMES DAILY
Qty: 30 TAB | Refills: 0 | Status: SHIPPED | OUTPATIENT
Start: 2020-08-19 | End: 2020-08-31

## 2020-08-19 RX ORDER — CIPROFLOXACIN 500 MG/1
500 TABLET ORAL 2 TIMES DAILY
Qty: 20 TAB | Refills: 0 | Status: SHIPPED | OUTPATIENT
Start: 2020-08-19 | End: 2020-08-31

## 2020-08-19 RX ORDER — PANTOPRAZOLE SODIUM 20 MG/1
20 TABLET, DELAYED RELEASE ORAL DAILY
Qty: 30 TAB | Refills: 0 | Status: SHIPPED | OUTPATIENT
Start: 2020-08-19 | End: 2020-09-01

## 2020-08-19 RX ADMIN — SODIUM CHLORIDE 125 ML/HR: 900 INJECTION, SOLUTION INTRAVENOUS at 04:47

## 2020-08-19 RX ADMIN — PIPERACILLIN AND TAZOBACTAM 3.38 G: 3; .375 INJECTION, POWDER, LYOPHILIZED, FOR SOLUTION INTRAVENOUS at 02:55

## 2020-08-19 RX ADMIN — HYDROMORPHONE HYDROCHLORIDE 0.5 MG: 1 INJECTION, SOLUTION INTRAMUSCULAR; INTRAVENOUS; SUBCUTANEOUS at 09:13

## 2020-08-19 RX ADMIN — HYDROMORPHONE HYDROCHLORIDE 0.5 MG: 1 INJECTION, SOLUTION INTRAMUSCULAR; INTRAVENOUS; SUBCUTANEOUS at 00:09

## 2020-08-19 RX ADMIN — HYDROMORPHONE HYDROCHLORIDE 0.5 MG: 1 INJECTION, SOLUTION INTRAMUSCULAR; INTRAVENOUS; SUBCUTANEOUS at 03:16

## 2020-08-19 RX ADMIN — SODIUM CHLORIDE 40 MG: 9 INJECTION, SOLUTION INTRAMUSCULAR; INTRAVENOUS; SUBCUTANEOUS at 09:12

## 2020-08-19 RX ADMIN — HYDROMORPHONE HYDROCHLORIDE 0.5 MG: 1 INJECTION, SOLUTION INTRAMUSCULAR; INTRAVENOUS; SUBCUTANEOUS at 06:32

## 2020-08-19 RX ADMIN — PIPERACILLIN AND TAZOBACTAM 3.38 G: 3; .375 INJECTION, POWDER, LYOPHILIZED, FOR SOLUTION INTRAVENOUS at 11:06

## 2020-08-19 RX ADMIN — HYDROCODONE BITARTRATE AND ACETAMINOPHEN 1 TABLET: 5; 325 TABLET ORAL at 11:54

## 2020-08-19 NOTE — PROGRESS NOTES
Problem: Pressure Injury - Risk of  Goal: *Prevention of pressure injury  Description: Document Cristiano Scale and appropriate interventions in the flowsheet. Outcome: Progressing Towards Goal  Note: Pressure Injury Interventions:  Sensory Interventions: Assess changes in LOC    Moisture Interventions: Maintain skin hydration (lotion/cream), Minimize layers, Moisture barrier    Activity Interventions: Increase time out of bed         Nutrition Interventions: Document food/fluid/supplement intake                     Problem: Patient Education: Go to Patient Education Activity  Goal: Patient/Family Education  Outcome: Progressing Towards Goal     Problem: Patient Education: Go to Patient Education Activity  Goal: Patient/Family Education  Outcome: Progressing Towards Goal     Problem: Falls - Risk of  Goal: *Absence of Falls  Description: Document Delvin Terrell Fall Risk and appropriate interventions in the flowsheet. Outcome: Progressing Towards Goal  Note: Fall Risk Interventions:            Medication Interventions: Teach patient to arise slowly, Patient to call before getting OOB                   Problem: Patient Education: Go to Patient Education Activity  Goal: Patient/Family Education  Outcome: Progressing Towards Goal     Problem: Pressure Injury - Risk of  Goal: *Prevention of pressure injury  Description: Document Cristiano Scale and appropriate interventions in the flowsheet.   8/19/2020 0510 by Tammy Stoner  Outcome: Progressing Towards Goal  Note: Pressure Injury Interventions:  Sensory Interventions: Assess changes in LOC    Moisture Interventions: Maintain skin hydration (lotion/cream), Minimize layers, Moisture barrier    Activity Interventions: Increase time out of bed         Nutrition Interventions: Document food/fluid/supplement intake                  8/19/2020 0505 by Tammy Stoner  Outcome: Progressing Towards Goal  Note: Pressure Injury Interventions:  Sensory Interventions: Assess changes in LOC    Moisture Interventions: Maintain skin hydration (lotion/cream), Minimize layers, Moisture barrier    Activity Interventions: Increase time out of bed         Nutrition Interventions: Document food/fluid/supplement intake                     Problem: Patient Education: Go to Patient Education Activity  Goal: Patient/Family Education  8/19/2020 0510 by Margo Mccarthy  Outcome: Progressing Towards Goal  8/19/2020 0505 by Margo Mccarthy  Outcome: Progressing Towards Goal     Problem: Patient Education: Go to Patient Education Activity  Goal: Patient/Family Education  8/19/2020 0510 by Margo Mccarthy  Outcome: Progressing Towards Goal  8/19/2020 0505 by Margo Mccarthy  Outcome: Progressing Towards Goal     Problem: Falls - Risk of  Goal: *Absence of Falls  Description: Document Lashonda Lowden Fall Risk and appropriate interventions in the flowsheet. 8/19/2020 0510 by Margo Mccarthy  Outcome: Progressing Towards Goal  Note: Fall Risk Interventions:            Medication Interventions: Teach patient to arise slowly, Patient to call before getting OOB                8/19/2020 0505 by Margo Mccarthy  Outcome: Progressing Towards Goal  Note: Fall Risk Interventions:            Medication Interventions: Teach patient to arise slowly, Patient to call before getting OOB                   Problem: Patient Education: Go to Patient Education Activity  Goal: Patient/Family Education  8/19/2020 0510 by Margo Mccarthy  Outcome: Progressing Towards Goal  8/19/2020 0505 by Margo Mccarthy  Outcome: Progressing Towards Goal     Problem: Pain  Goal: *Control of Pain  8/19/2020 0510 by Margo Mccarthy  Outcome: Not Progressing Towards Goal  Note: Pt does not feel that Hiram Zaks helps him with his pain at all. Pt received dilaudid during shift for pain of 8 out of 10.  8/19/2020 0505 by Margo Mccarthy  Outcome: Not Progressing Towards Goal  Note: Pt does not feel that the Norco helps him at all for his pain.  Received dilaudid for pain relief for pain of 8 out of 10.

## 2020-08-19 NOTE — DISCHARGE SUMMARY
DISCHARGE SUMMARY      Patient ID:  Micaela Craig  [id number]  64 y.o.  1960    Admit date: 8/13/2020    Discharge date and time: No discharge date for patient encounter. Admitting Physician: Ally Brown MD     Discharge Physician: Tonya Woo MD      Admission Diagnoses: Duodenal ulcer with perforation [facility]) [K26.5]    Discharge Diagnoses: Principal Problem:    Duodenal ulcer with perforation ([address] (8/13/2020)    Active Problems:    Other acute pancreatitis with uninfected necrosis (8/19/2020)        Procedures: * No surgery found *    Consults: GI and general surgery        Hospital Course:   Patient was admitted with abdominal pain and nausea. ER work-up showed a complex fluid collection in the wall of the duodenum on the medial side concerning for contained perforated ulcer. He was started on PPIs and broad spectrum antibiotics as well as bowel rest.  The collection was aspirated by interventional radiology. Cultures remained sterile. Patient showed improvement with resolution of chemical pancreatitis, improvement in pain and tolerance of PO. An interval CT was done on HD #5 that showed improvement in the collection  BUT, suggested a possible periampullary mass. So, GI was consulted and an MRI was done. MRI confirmed no mass. GI decided no endoscopy needed. Patient's diet was advance and he was discharged home in stable condition. Follow up was arranged for one week with and interval CT to assess for resolution of the collection. He was given a PPI and 10 days of antibiotics. D/C Disposition: home     Activities: as tolerated      Patient Instructions:   Current Discharge Medication List      START taking these medications    Details   ciprofloxacin HCl (CIPRO) 500 mg tablet Take 1 Tab by mouth two (2) times a day for 10 days. Qty: 20 Tab, Refills: 0      metroNIDAZOLE (FLAGYL) 500 mg tablet Take 1 Tab by mouth three (3) times daily for 10 days.   Qty: 30 Tab, Refills: 0 HYDROcodone-acetaminophen (NORCO) 5-325 mg per tablet Take 1 Tab by mouth every six (6) hours as needed for Pain for up to 3 days. Max Daily Amount: 4 Tabs. Qty: 12 Tab, Refills: 0    Associated Diagnoses: Duodenal ulcer with perforation (HCC)      pantoprazole (PROTONIX) 20 mg tablet Take 1 Tab by mouth daily.   Qty: 30 Tab, Refills: 0             Diet: resume pre-hospital diet    Follow-up with Dr Raffi Frances with out patient interval CT scan on 8/28/2020       Signed:  Maximilian Flower MD  8/19/2020  12:17 PM

## 2020-08-19 NOTE — PROCEDURES
[facility] Endoscopic Gastroduodenoscopy Procedure Note 8/19/2020 Lindsey Guzman 1960 [id number] Procedure: Endoscopic Gastroduodenoscopy with biopsy Indication:  GERD Pre-operative Diagnosis: see indication above Post-operative Diagnosis: see findings below : KATIE David MD 
 
Referring Provider:  None Anesthesia/Sedation:  MAC anesthesia Propofol Airway assessment: No airway problems anticipated Pre-Procedural Exam: Airway: clear, no airway problems anticipated Heart: RRR, without gallops or rubs Lungs: clear bilaterally without wheezes, crackles, or rhonchi Abdomen: soft, nontender, nondistended, bowel sounds present Mental Status: awake, alert and oriented to person, place and time Procedure Details After infomed consent was obtained for the procedure, with all risks and benefits of procedure explained the patient was taken to the endoscopy suite and placed in the left lateral decubitus position. Following sequential administration of sedation as per above, the endoscope was inserted into the mouth and advanced under direct vision to second portion of the duodenum. A careful inspection was made as the gastroscope was withdrawn, including a retroflexed view of the proximal stomach; findings and interventions are described below. Findings:  
Esophagus:  Distal esophageal erosive esophagitis. LA Grade B. Biopsied. No stricture or ring seen Stomach: mild antritis, biopsied. Pylorus patent Duodenum: normal 
 
Therapies:  biopsy of esophagus 
biopsy of stomach antrum Specimens: 1. Biopsies of gastric antrum  2. Biopsies of distal esophagus Complications:   None; patient tolerated the procedure well. EBL:  None. Impression: 1. Erosive esophagitis, grade B 
2. Mild gastritis 3. Normal duodenum Recommendations: -Continue acid suppression with famotidine but increase to 40 mg BID., -Await pathology. , -Consider surgical anti-reflux procedure such as LINX since does not tolerate PPI Noel Crowell MD8/19/2020

## 2020-08-19 NOTE — PROGRESS NOTES
Problem: Pressure Injury - Risk of  Goal: *Prevention of pressure injury  Description: Document Cristiano Scale and appropriate interventions in the flowsheet. 8/19/2020 1437 by Yumiko Otero  Outcome: Resolved/Met     Problem: Patient Education: Go to Patient Education Activity  Goal: Patient/Family Education  8/19/2020 1437 by Yumiko Otero  Outcome: Resolved/Met     Problem: Pain  Goal: *Control of Pain  8/19/2020 1437 by Yumiko Otero  Outcome: Resolved/Met       Problem: Patient Education: Go to Patient Education Activity  Goal: Patient/Family Education  8/19/2020 1437 by Yumiko Otero  Outcome: Resolved/Met       Problem: Falls - Risk of  Goal: *Absence of Falls  Description: Document Jez Fall Risk and appropriate interventions in the flowsheet.   8/19/2020 1437 by Yumiko Otero  Outcome: Resolved/Met       Problem: Patient Education: Go to Patient Education Activity  Goal: Patient/Family Education  8/19/2020 1437 by Yumiko Otero  Outcome: Resolved/Met

## 2020-08-19 NOTE — PROGRESS NOTES
1049: Contacted the office of Dr. Nataly Vidales and Lina Prather NP to find out a status on the patient having a procedure today. Patient is getting very restless and wanted to know if he is having a procedure or not. I spoke with [facility] and she transferred me to the nurse practioner, Lucita Moreno. She stated that the patient did not need an EGD and she provided me with a verbal order for GI Lite diet. She stated that Dr. Nataly Vidales will be rounding on the patient soon to give him an update and let him know about the plan. Primary RNCHEN made aware. I let the patient know as well and he is okay with that. GI Lite diet order will be placed.

## 2020-08-19 NOTE — DISCHARGE INSTRUCTIONS
Patient Education        Learning About Acute Pancreatitis  What is acute pancreatitis? The pancreas is an organ behind the stomach. It makes hormones like insulin that help control how your body uses sugar. It also makes enzymes that help you digest food. Usually these enzymes flow from the pancreas to the intestines. But if they leak into the pancreas, they can irritate it and cause pain and swelling. When this happens suddenly, it's called acute pancreatitis. What causes it? Most of the time, acute pancreatitis is caused by gallstones or by heavy alcohol use. But several other things can cause it, such as:  · High levels of fats in the blood. · An injury. · A problem after a surgery or a procedure. · Certain medicines. What are the symptoms? The main symptom is pain in the upper belly. The pain can be severe. You also may have a fever, nausea, or vomiting. Some people get so sick that they have problems breathing. They also may have low blood pressure. How is it diagnosed? Your doctor will diagnose pancreatitis with an exam and by looking at your lab tests. Your doctor may think that you have this problem based on your symptoms and where you have pain in your belly. You may have blood tests of enzymes called amylase and lipase. In pancreatitis, the level of these enzymes is usually much higher than normal.  You also may have imaging tests of the belly. These may include an ultrasound, a CT scan, or an MRI. A special MRI called MRCP can show images of the bile ducts. This test can be very helpful when gallstones are causing the problem. How is it treated? Treatment of acute pancreatitis usually takes place in the hospital. It focuses on taking care of pain and supporting your body while your pancreas heals. In severe cases, treatment may occur in an intensive care unit to support breathing, treat serious infections, or help raise very low blood pressure.   If a gallstone is causing the problem, the gallstone may need to be removed. This is done during a procedure called ERCP. The doctor puts a scope in your mouth and moves it gently through the stomach and into the ducts from the pancreas and gallbladder. The doctor is then able to see a stone and remove it. Sometimes the gallbladder, which makes gallstones, needs to be removed by surgery. People with pancreatitis often need a lot of fluid to help support their other organs and their blood pressure. They get fluids through a vein (intravenous, or IV). Instead of food by mouth, nutrition is sometimes given through a vein while the pancreas heals. Follow-up care is a key part of your treatment and safety. Be sure to make and go to all appointments, and call your doctor if you are having problems. It's also a good idea to know your test results and keep a list of the medicines you take. Where can you learn more? Go to http://www.gray.com/  Enter S137 in the search box to learn more about \"Learning About Acute Pancreatitis. \"  Current as of: August 12, 2019               Content Version: 12.5  © 8926-7848 Healthwise, Incorporated. Care instructions adapted under license by [employer] (which disclaims liability or warranty for this information). If you have questions about a medical condition or this instruction, always ask your healthcare professional. [address] any warranty or liability for your use of this information. Please call [phone] to make a follow up appointment with MYRON Jacobo next week    [employer] of [address] [facility], [address] [facility], [address] [facility]  [place], aCt[facility]  [phone]  Fax [phone]

## 2020-08-19 NOTE — PROGRESS NOTES
General Surgery End of Shift Nursing Note    Bedside shift change report given to Feliciano Almanzar (oncoming nurse) by Aldo Clemons (offgoing nurse). Report included the following information SBAR, Kardex, Intake/Output, MAR and Recent Results. Shift worked:   7p-7a   Summary of shift:   Pt had uneventful shift. Pt stated that the [employer] does not work for him and asked/received dilaudid during the shift for pain of 8 out of 10 in the abdomen. Issues for physician to address:   Pain management     Number times ambulated in hallway past shift: 0    Number of times OOB to chair past shift: 0    Pain Management:  Current medication: Norco PRN, Dilaudid PRN  Patient states pain is manageable on current pain medication: YES, if using dilaudid    GI:    Current diet:  DIET NUTRITIONAL SUPPLEMENTS All Meals; Ensure Clear  DIET NPO    Tolerating current diet: YES  Passing flatus: YES  Last Bowel Movement: yesterday   Appearance: Not visualized      Patient Safety:    Falls Score: 1  Bed Alarm On? Not applicable  Sitter?  Not applicable    Shania Dejesus

## 2020-08-19 NOTE — PROGRESS NOTES
VILLA Plan:                 *Home w/family              *brother to transport at d/c   *[facility]-outpatient CT scheduled [email or web address]    Patient is discharging home today without any needs or concerns. Follow-up appointments are on the AVS.  Per request, CM will fax CT order to central scheduling. Care Management Interventions  PCP Verified by CM: Yes(no PCP)  Mode of Transport at Discharge: Other (see comment)(brother)  Transition of Care Consult (CM Consult): Discharge Planning  Discharge Durable Medical Equipment: No(No DME use)  Physical Therapy Consult: No  Occupational Therapy Consult: No  Speech Therapy Consult: No  Current Support Network:  Other(outpatient CT scan )  Confirm Follow Up Transport: Self(patient states he drives a moped)  Discharge Location  Discharge Placement: Home with family assistance      Urbano Hernandez  Ext [phone]

## 2020-08-19 NOTE — PROGRESS NOTES
GI NOTE      MRCP reviewed with radiologist:  1. 2.3 x 1.1 x 2.5 cm septated lobulated fluid in the medial wall of the second  portion of the duodenum is decreased since 5 days ago but slightly increased  since one day ago. Given the mural thickening of the duodenum and the  surrounding inflammation, this finding most likely represents a contained  perforation of a duodenal ulcer. 2. No solid mass or evidence of malignancy. 3. Mild biliary dilatation. No ampullary mass. No choledocholithiasis. Discussed with Dr. Winnie Sanchez.  If tolerates diet then dc home  Follow up with surgery in 1 week

## 2020-08-19 NOTE — PROGRESS NOTES
SURGERY PROGRESS NOTE      Admit Date: 2020        Subjective:     Patient feels better today. Denies nausea. Tolerating PO. Pain controlled. Objective:     Visit Vitals  /72   Pulse 60   Temp 97.8 °F (36.6 °C)   Resp 16   Ht 5' 11\" (1.803 m)   Wt 60.8 kg (134 lb)   SpO2 100%   BMI 18.69 kg/m²        Temp (24hrs), Av.6 °F (36.4 °C), Min:97.2 °F (36.2 °C), Max:98 °F (36.7 °C)      701 - 1900  In: 1118.8 [I.V.:1118.8]  Out: -    190 -  0700  In: 3501.7 [P.O.:560; I.V.:2941.7]  Out: 500 [Urine:500]    Physical Exam:    General:     Abdomen: soft, bowel sounds active, non-tender           Lab Results   Component Value Date/Time    WBC 10.0 2020 03:23 AM    HGB 14.7 2020 03:23 AM    HCT 45.8 2020 03:23 AM    PLATELET 552  03:23 AM    MCV 85.0 2020 03:23 AM     Lab Results   Component Value Date/Time    GFR est non-AA >60 2020 01:51 AM    GFR est AA >60 2020 01:51 AM    Creatinine 0.89 2020 01:51 AM    BUN 5 (L) 2020 01:51 AM    Sodium 138 2020 01:51 AM    Potassium 3.6 2020 01:51 AM    Chloride 104 2020 01:51 AM    CO2 33 (H) 2020 01:51 AM       MRI -  Confirms original CT findings. There is no visible periampullary mass. Assessment:     Principal Problem:    Duodenal ulcer with perforation ([address] (2020)    improved.         Plan:       D/C home today  Follow up next Friday with CT scan

## 2020-08-25 ENCOUNTER — HOSPITAL ENCOUNTER (OUTPATIENT)
Dept: CT IMAGING | Age: 60
Discharge: HOME | DRG: 282 | End: 2020-08-25
Attending: SURGERY
Payer: MEDICAID

## 2020-08-25 DIAGNOSIS — K26.5 DUODENAL ULCER PERFORATION (HCC): ICD-10-CM

## 2020-08-25 PROCEDURE — 74011000636 HC RX REV CODE- 636: Performed by: SURGERY

## 2020-08-25 PROCEDURE — 74177 CT ABD & PELVIS W/CONTRAST: CPT

## 2020-08-25 RX ORDER — SODIUM CHLORIDE 0.9 % (FLUSH) 0.9 %
10 SYRINGE (ML) INJECTION
Status: COMPLETED | OUTPATIENT
Start: 2020-08-25 | End: 2020-08-25

## 2020-08-25 RX ADMIN — IOHEXOL 50 ML: 240 INJECTION, SOLUTION INTRATHECAL; INTRAVASCULAR; INTRAVENOUS; ORAL at 12:32

## 2020-08-25 RX ADMIN — IOPAMIDOL 100 ML: 755 INJECTION, SOLUTION INTRAVENOUS at 12:32

## 2020-08-25 RX ADMIN — Medication 10 ML: at 12:32

## 2020-08-27 ENCOUNTER — APPOINTMENT (OUTPATIENT)
Dept: CT IMAGING | Age: 60
DRG: 282 | End: 2020-08-27
Attending: EMERGENCY MEDICINE
Payer: MEDICAID

## 2020-08-27 ENCOUNTER — OFFICE VISIT (OUTPATIENT)
Dept: FAMILY MEDICINE CLINIC | Age: 60
End: 2020-08-27
Payer: MEDICAID

## 2020-08-27 ENCOUNTER — HOSPITAL ENCOUNTER (INPATIENT)
Age: 60
LOS: 4 days | Discharge: HOME | DRG: 282 | End: 2020-08-31
Attending: EMERGENCY MEDICINE | Admitting: HOSPITALIST
Payer: MEDICAID

## 2020-08-27 VITALS
WEIGHT: 134 LBS | SYSTOLIC BLOOD PRESSURE: 133 MMHG | HEART RATE: 88 BPM | HEIGHT: 71 IN | BODY MASS INDEX: 18.76 KG/M2 | RESPIRATION RATE: 20 BRPM | TEMPERATURE: 97.3 F | OXYGEN SATURATION: 98 % | DIASTOLIC BLOOD PRESSURE: 86 MMHG

## 2020-08-27 DIAGNOSIS — K26.5 DU (PERFORATED DUODENAL ULCER) (HCC): ICD-10-CM

## 2020-08-27 DIAGNOSIS — K85.81 OTHER ACUTE PANCREATITIS WITH UNINFECTED NECROSIS: ICD-10-CM

## 2020-08-27 DIAGNOSIS — K63.0 DUODENAL ABSCESS: Primary | ICD-10-CM

## 2020-08-27 DIAGNOSIS — K26.5 DUODENAL ULCER WITH PERFORATION (HCC): ICD-10-CM

## 2020-08-27 DIAGNOSIS — F17.200 SMOKING: ICD-10-CM

## 2020-08-27 DIAGNOSIS — R10.9 CONTINUOUS SEVERE ABDOMINAL PAIN: Primary | ICD-10-CM

## 2020-08-27 LAB
ALBUMIN SERPL-MCNC: 4 G/DL (ref 3.5–5)
ALBUMIN/GLOB SERPL: 0.9 {RATIO} (ref 1.1–2.2)
ALP SERPL-CCNC: 100 U/L (ref 45–117)
ALT SERPL-CCNC: 57 U/L (ref 12–78)
ANION GAP SERPL CALC-SCNC: 2 MMOL/L (ref 5–15)
APPEARANCE UR: ABNORMAL
AST SERPL-CCNC: 32 U/L (ref 15–37)
BASOPHILS # BLD: 0.1 K/UL (ref 0–0.1)
BASOPHILS NFR BLD: 0 % (ref 0–1)
BILIRUB SERPL-MCNC: 0.3 MG/DL (ref 0.2–1)
BILIRUB UR QL: NEGATIVE
BUN SERPL-MCNC: 10 MG/DL (ref 6–20)
BUN/CREAT SERPL: 9 (ref 12–20)
CALCIUM SERPL-MCNC: 10 MG/DL (ref 8.5–10.1)
CEA SERPL-MCNC: 3.4 NG/ML
CHLORIDE SERPL-SCNC: 103 MMOL/L (ref 97–108)
CO2 SERPL-SCNC: 33 MMOL/L (ref 21–32)
COLOR UR: ABNORMAL
CREAT SERPL-MCNC: 1.17 MG/DL (ref 0.7–1.3)
DIFFERENTIAL METHOD BLD: ABNORMAL
EOSINOPHIL # BLD: 0.1 K/UL (ref 0–0.4)
EOSINOPHIL NFR BLD: 1 % (ref 0–7)
ERYTHROCYTE [DISTWIDTH] IN BLOOD BY AUTOMATED COUNT: 16.1 % (ref 11.5–14.5)
GLOBULIN SER CALC-MCNC: 4.5 G/DL (ref 2–4)
GLUCOSE SERPL-MCNC: 106 MG/DL (ref 65–100)
GLUCOSE UR STRIP.AUTO-MCNC: NEGATIVE MG/DL
HCT VFR BLD AUTO: 50.6 % (ref 36.6–50.3)
HGB BLD-MCNC: 16.2 G/DL (ref 12.1–17)
HGB UR QL STRIP: NEGATIVE
IMM GRANULOCYTES # BLD AUTO: 0.1 K/UL (ref 0–0.04)
IMM GRANULOCYTES NFR BLD AUTO: 0 % (ref 0–0.5)
KETONES UR QL STRIP.AUTO: NEGATIVE MG/DL
LACTATE BLD-SCNC: 1.39 MMOL/L (ref 0.4–2)
LEUKOCYTE ESTERASE UR QL STRIP.AUTO: NEGATIVE
LIPASE SERPL-CCNC: 543 U/L (ref 73–393)
LYMPHOCYTES # BLD: 4.3 K/UL (ref 0.8–3.5)
LYMPHOCYTES NFR BLD: 28 % (ref 12–49)
MCH RBC QN AUTO: 27.7 PG (ref 26–34)
MCHC RBC AUTO-ENTMCNC: 32 G/DL (ref 30–36.5)
MCV RBC AUTO: 86.5 FL (ref 80–99)
MONOCYTES # BLD: 1.1 K/UL (ref 0–1)
MONOCYTES NFR BLD: 7 % (ref 5–13)
NEUTS SEG # BLD: 9.5 K/UL (ref 1.8–8)
NEUTS SEG NFR BLD: 64 % (ref 32–75)
NITRITE UR QL STRIP.AUTO: NEGATIVE
NRBC # BLD: 0 K/UL (ref 0–0.01)
NRBC BLD-RTO: 0 PER 100 WBC
PH UR STRIP: 7.5 [PH] (ref 5–8)
PLATELET # BLD AUTO: 376 K/UL (ref 150–400)
PMV BLD AUTO: 9.9 FL (ref 8.9–12.9)
POTASSIUM SERPL-SCNC: 3.7 MMOL/L (ref 3.5–5.1)
PROT SERPL-MCNC: 8.5 G/DL (ref 6.4–8.2)
PROT UR STRIP-MCNC: NEGATIVE MG/DL
RBC # BLD AUTO: 5.85 M/UL (ref 4.1–5.7)
SODIUM SERPL-SCNC: 138 MMOL/L (ref 136–145)
SP GR UR REFRACTOMETRY: 1.02 (ref 1–1.03)
UROBILINOGEN UR QL STRIP.AUTO: 0.2 EU/DL (ref 0.2–1)
WBC # BLD AUTO: 15.1 K/UL (ref 4.1–11.1)

## 2020-08-27 PROCEDURE — 83605 ASSAY OF LACTIC ACID: CPT

## 2020-08-27 PROCEDURE — C9113 INJ PANTOPRAZOLE SODIUM, VIA: HCPCS | Performed by: HOSPITALIST

## 2020-08-27 PROCEDURE — 96374 THER/PROPH/DIAG INJ IV PUSH: CPT

## 2020-08-27 PROCEDURE — 80053 COMPREHEN METABOLIC PANEL: CPT

## 2020-08-27 PROCEDURE — 82378 CARCINOEMBRYONIC ANTIGEN: CPT

## 2020-08-27 PROCEDURE — 74011250636 HC RX REV CODE- 250/636: Performed by: EMERGENCY MEDICINE

## 2020-08-27 PROCEDURE — 99203 OFFICE O/P NEW LOW 30 MIN: CPT | Performed by: INTERNAL MEDICINE

## 2020-08-27 PROCEDURE — 74011000250 HC RX REV CODE- 250: Performed by: HOSPITALIST

## 2020-08-27 PROCEDURE — 65270000029 HC RM PRIVATE

## 2020-08-27 PROCEDURE — 86301 IMMUNOASSAY TUMOR CA 19-9: CPT

## 2020-08-27 PROCEDURE — 74011000258 HC RX REV CODE- 258: Performed by: INTERNAL MEDICINE

## 2020-08-27 PROCEDURE — 85025 COMPLETE CBC W/AUTO DIFF WBC: CPT

## 2020-08-27 PROCEDURE — 81003 URINALYSIS AUTO W/O SCOPE: CPT

## 2020-08-27 PROCEDURE — 99284 EMERGENCY DEPT VISIT MOD MDM: CPT

## 2020-08-27 PROCEDURE — 83690 ASSAY OF LIPASE: CPT

## 2020-08-27 PROCEDURE — 74011250636 HC RX REV CODE- 250/636: Performed by: HOSPITALIST

## 2020-08-27 PROCEDURE — 74011000258 HC RX REV CODE- 258: Performed by: HOSPITALIST

## 2020-08-27 PROCEDURE — 96375 TX/PRO/DX INJ NEW DRUG ADDON: CPT

## 2020-08-27 PROCEDURE — 74011250636 HC RX REV CODE- 250/636: Performed by: INTERNAL MEDICINE

## 2020-08-27 PROCEDURE — 99221 1ST HOSP IP/OBS SF/LOW 40: CPT | Performed by: SURGERY

## 2020-08-27 PROCEDURE — 36415 COLL VENOUS BLD VENIPUNCTURE: CPT

## 2020-08-27 RX ORDER — IBUPROFEN 200 MG
1 TABLET ORAL DAILY
Status: DISCONTINUED | OUTPATIENT
Start: 2020-08-28 | End: 2020-08-31 | Stop reason: HOSPADM

## 2020-08-27 RX ORDER — HYDRALAZINE HYDROCHLORIDE 20 MG/ML
10 INJECTION INTRAMUSCULAR; INTRAVENOUS
Status: DISCONTINUED | OUTPATIENT
Start: 2020-08-27 | End: 2020-08-31 | Stop reason: HOSPADM

## 2020-08-27 RX ORDER — ACETAMINOPHEN 650 MG/1
650 SUPPOSITORY RECTAL
Status: DISCONTINUED | OUTPATIENT
Start: 2020-08-27 | End: 2020-08-31 | Stop reason: HOSPADM

## 2020-08-27 RX ORDER — ONDANSETRON 2 MG/ML
4 INJECTION INTRAMUSCULAR; INTRAVENOUS
Status: DISCONTINUED | OUTPATIENT
Start: 2020-08-27 | End: 2020-08-31 | Stop reason: HOSPADM

## 2020-08-27 RX ORDER — SODIUM CHLORIDE 9 MG/ML
100 INJECTION, SOLUTION INTRAVENOUS CONTINUOUS
Status: DISCONTINUED | OUTPATIENT
Start: 2020-08-27 | End: 2020-08-29

## 2020-08-27 RX ORDER — ONDANSETRON 2 MG/ML
4 INJECTION INTRAMUSCULAR; INTRAVENOUS
Status: COMPLETED | OUTPATIENT
Start: 2020-08-27 | End: 2020-08-27

## 2020-08-27 RX ORDER — ENOXAPARIN SODIUM 100 MG/ML
30 INJECTION SUBCUTANEOUS DAILY
Status: DISCONTINUED | OUTPATIENT
Start: 2020-08-28 | End: 2020-08-31 | Stop reason: HOSPADM

## 2020-08-27 RX ORDER — SODIUM CHLORIDE 0.9 % (FLUSH) 0.9 %
5-40 SYRINGE (ML) INJECTION EVERY 8 HOURS
Status: DISCONTINUED | OUTPATIENT
Start: 2020-08-27 | End: 2020-08-31 | Stop reason: HOSPADM

## 2020-08-27 RX ORDER — SODIUM CHLORIDE 0.9 % (FLUSH) 0.9 %
10 SYRINGE (ML) INJECTION
Status: DISCONTINUED | OUTPATIENT
Start: 2020-08-27 | End: 2020-08-27

## 2020-08-27 RX ORDER — ACETAMINOPHEN 325 MG/1
650 TABLET ORAL
Status: DISCONTINUED | OUTPATIENT
Start: 2020-08-27 | End: 2020-08-31 | Stop reason: HOSPADM

## 2020-08-27 RX ORDER — POLYETHYLENE GLYCOL 3350 17 G/17G
17 POWDER, FOR SOLUTION ORAL DAILY PRN
Status: DISCONTINUED | OUTPATIENT
Start: 2020-08-27 | End: 2020-08-31 | Stop reason: HOSPADM

## 2020-08-27 RX ORDER — HYDROMORPHONE HYDROCHLORIDE 1 MG/ML
0.5 INJECTION, SOLUTION INTRAMUSCULAR; INTRAVENOUS; SUBCUTANEOUS
Status: DISCONTINUED | OUTPATIENT
Start: 2020-08-27 | End: 2020-08-28

## 2020-08-27 RX ORDER — SODIUM CHLORIDE 0.9 % (FLUSH) 0.9 %
5-40 SYRINGE (ML) INJECTION AS NEEDED
Status: DISCONTINUED | OUTPATIENT
Start: 2020-08-27 | End: 2020-08-31 | Stop reason: HOSPADM

## 2020-08-27 RX ORDER — PROMETHAZINE HYDROCHLORIDE 25 MG/1
12.5 TABLET ORAL
Status: DISCONTINUED | OUTPATIENT
Start: 2020-08-27 | End: 2020-08-31 | Stop reason: HOSPADM

## 2020-08-27 RX ORDER — FENTANYL CITRATE 50 UG/ML
25 INJECTION, SOLUTION INTRAMUSCULAR; INTRAVENOUS
Status: COMPLETED | OUTPATIENT
Start: 2020-08-27 | End: 2020-08-27

## 2020-08-27 RX ADMIN — HYDROMORPHONE HYDROCHLORIDE 0.5 MG: 1 INJECTION, SOLUTION INTRAMUSCULAR; INTRAVENOUS; SUBCUTANEOUS at 15:22

## 2020-08-27 RX ADMIN — HYDROMORPHONE HYDROCHLORIDE 0.5 MG: 1 INJECTION, SOLUTION INTRAMUSCULAR; INTRAVENOUS; SUBCUTANEOUS at 20:00

## 2020-08-27 RX ADMIN — FENTANYL CITRATE 25 MCG: 50 INJECTION, SOLUTION INTRAMUSCULAR; INTRAVENOUS at 12:59

## 2020-08-27 RX ADMIN — PIPERACILLIN AND TAZOBACTAM 3.38 G: 3; .375 INJECTION, POWDER, LYOPHILIZED, FOR SOLUTION INTRAVENOUS at 13:58

## 2020-08-27 RX ADMIN — Medication 10 ML: at 15:23

## 2020-08-27 RX ADMIN — SODIUM CHLORIDE 40 MG: 9 INJECTION, SOLUTION INTRAMUSCULAR; INTRAVENOUS; SUBCUTANEOUS at 15:16

## 2020-08-27 RX ADMIN — SODIUM CHLORIDE 100 ML/HR: 900 INJECTION, SOLUTION INTRAVENOUS at 13:57

## 2020-08-27 RX ADMIN — PIPERACILLIN AND TAZOBACTAM 3.38 G: 3; .375 INJECTION, POWDER, LYOPHILIZED, FOR SOLUTION INTRAVENOUS at 18:50

## 2020-08-27 RX ADMIN — ONDANSETRON 4 MG: 2 INJECTION INTRAMUSCULAR; INTRAVENOUS at 12:59

## 2020-08-27 RX ADMIN — HYDROMORPHONE HYDROCHLORIDE 0.5 MG: 1 INJECTION, SOLUTION INTRAMUSCULAR; INTRAVENOUS; SUBCUTANEOUS at 22:57

## 2020-08-27 RX ADMIN — Medication 10 ML: at 15:17

## 2020-08-27 RX ADMIN — Medication 10 ML: at 21:33

## 2020-08-27 RX ADMIN — SODIUM CHLORIDE 40 MG: 9 INJECTION, SOLUTION INTRAMUSCULAR; INTRAVENOUS; SUBCUTANEOUS at 21:32

## 2020-08-27 RX ADMIN — SODIUM CHLORIDE 100 ML/HR: 900 INJECTION, SOLUTION INTRAVENOUS at 22:59

## 2020-08-27 NOTE — PROGRESS NOTES
Chief Complaint   Patient presents with    New Patient     was seen on 8/13/2020    Abdominal Pain     hospital follow up /poferated duodinal ulcer     HPI: obtained through  #[id number]  Yvonne Haro is a 64 y.o.  male presents to [facility] care as hospital follow up. Patient was admitted 8/13/2020)-8/19/20 abdominal pain due to poferated duodinal ulcer. He was managed conservatively and discharged on oral antibiotics. Patient presents to office in severe abdominal pain. Advised him go to ER for further evaluation and treatment. Case was discussed with Dr. Reece James in the ER. Appreciate Dr. Claudia Green help    Review of Systems  Pertinent items are noted in hpi    Past Medical History:   Diagnosis Date    PUD (peptic ulcer disease)      Past Surgical History:   Procedure Laterality Date    HX CHOLECYSTECTOMY      UPPER GI ENDOSCOPY,BIOPSY  8/19/2020          Social History     Socioeconomic History    Marital status: SINGLE     Spouse name: Not on file    Number of children: Not on file    Years of education: Not on file    Highest education level: Not on file   Tobacco Use    Smoking status: Current Every Day Smoker    Smokeless tobacco: Never Used   Substance and Sexual Activity    Alcohol use: Yes     Comment: socially    Drug use: Never     No family history on file. Current Outpatient Medications   Medication Sig Dispense Refill    ciprofloxacin HCl (CIPRO) 500 mg tablet Take 1 Tab by mouth two (2) times a day for 10 days. 20 Tab 0    metroNIDAZOLE (FLAGYL) 500 mg tablet Take 1 Tab by mouth three (3) times daily for 10 days. 30 Tab 0    pantoprazole (PROTONIX) 20 mg tablet Take 1 Tab by mouth daily.  30 Tab 0     No Known Allergies    Objective:  Visit Vitals  /86   Pulse 88   Temp 97.3 °F (36.3 °C) (Oral)   Resp 20   Ht 5' 11\" (1.803 m)   Wt 134 lb (60.8 kg)   SpO2 98%   BMI 18.69 kg/m²     Physical Exam:   General appearance - alert, afebril, sick appearing in moderate distress  Mental status - alert, oriented to person, place, and time  Heart - normal rate, regular rhythm  Abdomen - firm,tender, nondistended  Ext-peripheral pulses normal, no pedal edema    Assessment/Plan:  Diagnoses and all orders for this visit:    Continuous severe abdominal pain    DU [facility]forated [facility] ulcer) [facility]      Patient Instructions   Follow up after ER visit    Follow-up and Dispositions    · Return if symptoms worsen or fail to improve, for after ER visit.

## 2020-08-27 NOTE — PROGRESS NOTES
Pharmacy Medication Reconciliation     The patient was interviewed regarding current PTA medication list, use and drug allergies;  patient present in room and obtained permission from patient to discuss drug regimen. The patient was questioned regarding use of any other inhalers, topical products, over the counter medications, herbal medications, vitamin products or ophthalmic/nasal/otic medication use. Allergy Update: Patient has no known allergies. Recommendations/Findings: The following amendments were made to the patient's active medication list on file at [address]:   1) Additions: none    2) Deletions: none    3) Changes: none      Pertinent Findings: none    -Clarified PTA med list with patient interview, Rx query, 8/19/20 discharge med rec. PTA medication list was corrected to the following:     Prior to Admission Medications   Prescriptions Last Dose Informant Taking?   ciprofloxacin HCl (CIPRO) 500 mg tablet 8/20/2020 at Unknown time Self Yes   Sig: Take 1 Tab by mouth two (2) times a day for 10 days. metroNIDAZOLE (FLAGYL) 500 mg tablet 8/20/2020 at Unknown time Self Yes   Sig: Take 1 Tab by mouth three (3) times daily for 10 days. pantoprazole (PROTONIX) 20 mg tablet 8/20/2020 at Unknown time Self Yes   Sig: Take 1 Tab by mouth daily.       Facility-Administered Medications: None          Thank you,  BASILIO Phillips

## 2020-08-27 NOTE — ED NOTES
Assumed care of patient. Patient in no apparent distress. Placed on monitor x3 Provided pt with a blanket. Blue phones at bedside for MD. Pt speaks Japanese. Call bell within reach. Will continue to monitor. [address] phones used to communicate with patient Interpretor number [id number]. MD and RN at bedside. Pt states pain was gone until Saturday with medications but not the medications are not working. Denies CP/SOB. Pt states his urine is dark. Pt is having diarrhea. 18- Per MD Baum, surgeon is canceling CT so no need for contrast. MD Russell Hair at bedside. 1342- Hospitalist at bedside.

## 2020-08-27 NOTE — H&P
[address]  HISTORY AND PHYSICAL    Name:  Meade Peabody  MR#:  [id number]  :  1960  ACCOUNT #:  [de-identified]  ADMIT DATE:  2020      CHIEF COMPLAINT:  Abdominal pain. Please note, # [id number]. HISTORY OF PRESENT ILLNESS:  This is a 57-year-old Scottish speaking male who was  recently admitted and was discharged on 2020 when he was found to have a perforated duodenal ulcer with complex fluid collection. At that time, the patient was initially started on PPIs and broad-spectrum antibiotics, but was noted to have chemical peritonitis with possible periampullary mass. Subsequently, the patient had an MRI of the abdomen done, which confirmed no mass. GI was consulted and they recommended to not do endoscopy. The patient was discharged home on PPI and 10 days of antibiotics. The patient states that he restarted having pain from last Saturday that is about four days ago and it progressively got worse. His abdominal pain was mainly in the epigastric region to the point that now his abdominal pain is about 8/10 in intensity, sharp, non-radiating associated with nausea, but no vomiting. He did not have any fever. No cough, no headache or dizziness, no changes in bowel movements. About two days ago, the patient had a followup CT scan done, which revealed an increase in the fluid collection near the abdomen. Also because of his progressively worsening abdominal pain, the patient came into the ER. PAST MEDICAL HISTORY:  Peptic ulcer disease. PAST SURGICAL HISTORY:  Cholecystectomy. ALLERGIES:  NO KNOWN DRUG ALLERGIES. HOME MEDICATIONS:  The patient is on following medications:  1. Ciprofloxacin 500 mg p.o. b.i.d.  2.  Flagyl 500 mg p.o. t.i.d.  3.  Protonix 20 mg p.o. daily. SOCIAL HISTORY:  The patient smokes about 5-6 cigarettes per day. Used to drink alcohol, but has reportedly quit since last year. Non-IV drug abuser.     FAMILY HISTORY:  Significant for coronary artery disease in his mother. REVIEW OF SYSTEMS:  Pertinent positive as above. Rest of review of systems were done, they were all negative except for above. PHYSICAL EXAMINATION:  GENERAL:  Not in acute distress. Comfortably lying in bed. VITAL SIGNS:  At the time the patient was seen, the patient's vitals were as follows:  Blood pressure 158/67, pulse 81, afebrile, respiratory rate 26, saturating 90% on room air. HEENT:  Head normocephalic, atraumatic. Eyes, PERRLA, EOMI. Ears, bilateral TMs normal.  No  bleeding. Mouth, dry mucous membranes, decent oral hygiene. NECK:  Supple. No JVD or thyromegaly. RESPIRATORY:  Clear to auscultation bilaterally. No diminishment in breath sounds. CARDIOVASCULAR:  S1 and S2 normal.  No murmurs, rubs, or gallops. GI:  Bowel sounds are present. Tenderness in the epigastric region, but no rebound, no guarding. No point tenderness at the McBurney's point. NEUROLOGIC:  Cranial nerves II through XII intact. Motor 4/4 bilateral upper limbs and lower limbs. Sensory normal.  PSYCHIATRIC:  Mood and affect appropriate. Alert, awake, and oriented x3. LABORATORY DATA:  WBC of 15.1, hemoglobin 16.2, hematocrit 50, and a platelet count of 296. Urinalysis do not show signs of infection. Sodium 138, potassium 3.7, chloride 103, bicarb 33, BUN 10, creatinine 1.17. Lipase is 543. CT scan of the abdomen was done on 08/25/2020, which showed increase in size of the fluid collection associated with the duodenal ulcer. ASSESSMENT AND PLAN:  This is a 61-year-old Filipino speaking male with a past medical history of peptic ulcer disease, he comes over here with abdominal pain and was recently found to have perforated peptic ulcer, which contains complex fluid collection. 1.  Abdominal pain with a recent history of perforated duodenal ulcer, now with CT scan evidence of increasing sizes of fluid collection.   I will admit the patient as inpatient. We will put him on intravenous Protonix and Zosyn. We will keep the patient NPO until the patient is seen by Gastroenterology and Surgery. Surgery had already seen the patient, we will wait for the Gastroenterology recommendation. For now, we will continue the intravenous fluid and pain control. 2.  The patient states that he has lost about 5 pounds since his last admission, which might indicate mild protein-calorie malnutrition. Currently, the patient would be NPO. We will consult the Nutrition once the patient has stared eating. 3.  Smoking, the patient has been counseled extensively against it . For now, we will put the patient on nicotine patch. I spent about 45 minutes in taking care of the patient.         Delta Tinoco MD      [id number]  D:  08/27/2020 14:30  T:  08/27/2020 17:21  JOB #:  [id number]

## 2020-08-27 NOTE — CONSULTS
Consult Date: 8/27/2020    Consults  1-probable pancreatitis with possible pseudocyst, possible perforated duodenal ulcer with abscess, although less likely based on previous cultures and history of pancreatic inflammation pancreatitis, possible but less likely pancreatic malignancy.-fluid collection medial aspect of duodenal sweep second portion enlarged by CT scan failure previous aspiration alone. I reviewed this earlier today in anticipation of patient's office follow-up tomorrow, and Dr. Kathrin Clark who saw the patient previously and myself both feel this is some manifestation of pancreatic pseudocyst, previous cultures were not showing any bacteria or organisms of significance and this was not sent for lipase or amylase to my knowledge. The plan was for this patient to follow-up if symptomatic with GI again to consider EGD and/or possible endoscopic ultrasound evaluation and possible drainage which may be necessary. Dr. Kathrin Clark indicated this patient may have had endoscopic ultrasound in the past by 1 of his partners. If this cannot be drained endoscopically then certainly could place interventional radiology drain and sent for further studies such as lipase amylase cytology as well as repeat cultures but for now this is nonsurgical  We will check CEA 19 9 and CEA       #2-history of alcohol substance abuse and smoking dependency        Subjective   Patient is a 72-year-old male admitted a few weeks ago with evidence of pain and fluid collection medial aspect of second portion of duodenum that was aspirated by radiology and did not grow anything but I do not believe this was sent for any fluid such as amylase or lipase. MRCP did not add anything different to this.   Patient was scheduled for outpatient follow-up in surgery clinic tomorrow and I had previously discussed this with gastroenterology Dr. Kathrin Clark who had seen the patient while hospitalized previously and I recommendation was for patient to follow-up with GI if symptomatic could consider possible EGD and/or possible ultrasound evaluation. Could consider image guided drain placement of this but in a treacherous spot and may want to see if GI thinks they can drain this endoscopically. Any surgical procedure could involve pancreaticoduodenectomy Whipple procedure which could be rather morbid in this patient especially with smoking history. If no's endoscopic options then we may need to consult Dr. Chely Swift to see if he is interested in pancreatic surgery as this is out of our area of expertise  On imaging, Gallbladder nl, and LFT near nl     Patient returned to the emergency room today prior to office follow-up tomorrow with increasing pain. White blood cell count 15,000 lipase 500    Discussion with patient is through   phone    Face to face and review and discussion 35 min   Past Medical History:   Diagnosis Date    PUD (peptic ulcer disease)       Past Surgical History:   Procedure Laterality Date    HX CHOLECYSTECTOMY      UPPER GI ENDOSCOPY,BIOPSY  8/19/2020          History reviewed. No pertinent family history. Social History     Tobacco Use    Smoking status: Current Every Day Smoker    Smokeless tobacco: Never Used   Substance Use Topics    Alcohol use: Yes     Comment: socially       Current Outpatient Medications   Medication Sig Dispense Refill    ciprofloxacin HCl (CIPRO) 500 mg tablet Take 1 Tab by mouth two (2) times a day for 10 days. 20 Tab 0    metroNIDAZOLE (FLAGYL) 500 mg tablet Take 1 Tab by mouth three (3) times daily for 10 days. 30 Tab 0    pantoprazole (PROTONIX) 20 mg tablet Take 1 Tab by mouth daily. 30 Tab 0        Review of Systems   Respiratory: Negative. Cardiovascular: Negative. Gastrointestinal: Positive for abdominal pain. RUQ epigastric abd pain    Genitourinary: Negative. Neurological: Negative. Psychiatric/Behavioral: Negative.     All other systems reviewed and are negative. Objective     Vital signs for last 24 hours:  Visit Vitals  /83 (BP 1 Location: Left arm, BP Patient Position: At rest)   Pulse 92   Temp 97.3 °F (36.3 °C)   Resp 16   Ht 5' 11\" (1.803 m)   Wt 60.4 kg (133 lb 2.5 oz)   SpO2 100%   BMI 18.57 kg/m²       Intake/Output this shift:  Current Shift: No intake/output data recorded. Last 3 Shifts: No intake/output data recorded. Data Review:   Recent Results (from the past 24 hour(s))   METABOLIC PANEL, COMPREHENSIVE    Collection Time: 08/27/20 12:06 PM   Result Value Ref Range    Sodium 138 136 - 145 mmol/L    Potassium 3.7 3.5 - 5.1 mmol/L    Chloride 103 97 - 108 mmol/L    CO2 33 (H) 21 - 32 mmol/L    Anion gap 2 (L) 5 - 15 mmol/L    Glucose 106 (H) 65 - 100 mg/dL    BUN 10 6 - 20 MG/DL    Creatinine 1.17 0.70 - 1.30 MG/DL    BUN/Creatinine ratio 9 (L) 12 - 20      GFR est AA >60 >60 ml/min/1.73m2    GFR est non-AA >60 >60 ml/min/1.73m2    Calcium 10.0 8.5 - 10.1 MG/DL    Bilirubin, total 0.3 0.2 - 1.0 MG/DL    ALT (SGPT) 57 12 - 78 U/L    AST (SGOT) 32 15 - 37 U/L    Alk.  phosphatase 100 45 - 117 U/L    Protein, total 8.5 (H) 6.4 - 8.2 g/dL    Albumin 4.0 3.5 - 5.0 g/dL    Globulin 4.5 (H) 2.0 - 4.0 g/dL    A-G Ratio 0.9 (L) 1.1 - 2.2     LIPASE    Collection Time: 08/27/20 12:06 PM   Result Value Ref Range    Lipase 543 (H) 73 - 393 U/L   CBC WITH AUTOMATED DIFF    Collection Time: 08/27/20 12:06 PM   Result Value Ref Range    WBC 15.1 (H) 4.1 - 11.1 K/uL    RBC 5.85 (H) 4.10 - 5.70 M/uL    HGB 16.2 12.1 - 17.0 g/dL    HCT 50.6 (H) 36.6 - 50.3 %    MCV 86.5 80.0 - 99.0 FL    MCH 27.7 26.0 - 34.0 PG    MCHC 32.0 30.0 - 36.5 g/dL    RDW 16.1 (H) 11.5 - 14.5 %    PLATELET 102 210 - 234 K/uL    MPV 9.9 8.9 - 12.9 FL    NRBC 0.0 0  WBC    ABSOLUTE NRBC 0.00 0.00 - 0.01 K/uL    NEUTROPHILS 64 32 - 75 %    LYMPHOCYTES 28 12 - 49 %    MONOCYTES 7 5 - 13 %    EOSINOPHILS 1 0 - 7 %    BASOPHILS 0 0 - 1 %    IMMATURE GRANULOCYTES 0 0.0 - 0.5 % ABS. NEUTROPHILS 9.5 (H) 1.8 - 8.0 K/UL    ABS. LYMPHOCYTES 4.3 (H) 0.8 - 3.5 K/UL    ABS. MONOCYTES 1.1 (H) 0.0 - 1.0 K/UL    ABS. EOSINOPHILS 0.1 0.0 - 0.4 K/UL    ABS. BASOPHILS 0.1 0.0 - 0.1 K/UL    ABS. IMM. GRANS. 0.1 (H) 0.00 - 0.04 K/UL    DF AUTOMATED     POC LACTIC ACID    Collection Time: 08/27/20 12:08 PM   Result Value Ref Range    Lactic Acid (POC) 1.39 0.40 - 2.00 mmol/L       Physical Exam  Vitals signs and nursing note reviewed. Constitutional:       Appearance: Normal appearance. Comments: Pt wife present    HENT:      Nose: Nose normal.   Eyes:      Conjunctiva/sclera: Conjunctivae normal.   Cardiovascular:      Rate and Rhythm: Regular rhythm. Pulmonary:      Effort: Pulmonary effort is normal.   Abdominal:      Comments: Mild epigastric tenderness, otherwise benign and nontender    Skin:     General: Skin is warm and dry. Neurological:      General: No focal deficit present. Mental Status: He is alert and oriented to person, place, and time.    Psychiatric:         Mood and Affect: Mood normal.         Behavior: Behavior normal.

## 2020-08-27 NOTE — PROGRESS NOTES
General Surgery End of Shift Nursing Note    Bedside shift change report given to Bulmaro (oncoming nurse) by [facility] (offgoing nurse). Report included the following information SBAR, Kardex and Recent Results. Shift worked:   7a-7p   Summary of shift:    Pt had uneventful shift. Pt arrived on floor at 1430. Pts pain is being controlled with medication. Pt is to go for EGD tomorrow. Consent is signed and in chart. Issues for physician to address:   none     Number times ambulated in hallway past shift: 0    Number of times OOB to chair past shift: 0    Pain Management:  Current medication: See MAR  Patient states pain is manageable on current pain medication: YES    GI:    Current diet:  DIET NPO  DIET NPO    Tolerating current diet: YES  Passing flatus: YES  Last Bowel Movement: today   Appearance: small, soft    Respiratory:    Incentive Spirometer at bedside: YES  Patient instructed on use: YES    Patient Safety:    Falls Score: 1  Bed Alarm On? No  Sitter?  No    Lola Talbot

## 2020-08-27 NOTE — ED PROVIDER NOTES
EMERGENCY DEPARTMENT HISTORY AND PHYSICAL EXAM      Date: 8/27/2020  Patient Name: Angelique Carter    History of Presenting Illness     Chief Complaint   Patient presents with    Abdominal Pain     RLQ pain. pt had abdominal CT on 08/25 showing increase in free fluid. sent over by pcp. pt does not speak [place].  primary language is Croatian       History Provided By: Patient and Patient records    HPI: Angeilque Carter, 64 y.o. male presents to the ED with cc of abdominal pain. The patient was admitted on August 13 for duodenal ulcer with perforation. He was also found to have pancreatitis, and his  work-up revealed a complex fluid collection in the wall of the duodenum concerning for contained perforated ulcer. He was started on PPIs and antibiotics as well as bowel rest.  The collection was aspirated by interventional radiology. He was also seen by gastroenterology and an MRI was performed. This did not reveal any mass. Patient had an outpatient CT 2 days ago which revealed an increase in the fluid collection near the duodenum. Patient states his pain has worsened during the last week. Pain is more than a 10 out of 10 in severity, and involves the right upper quadrant and mid abdomen. Occasionally, he does have back pain with that. He denies fever, chills, cough, chest pain or shortness of breath. He denies dysuria, but states he has had several episodes of diarrhea over the last 2 days. He was sent to the ER by Dr. Mychal Vazquez, primary care physician. There are no other complaints, changes, or physical findings at this time. PCP: None    No current facility-administered medications on file prior to encounter. Current Outpatient Medications on File Prior to Encounter   Medication Sig Dispense Refill    ciprofloxacin HCl (CIPRO) 500 mg tablet Take 1 Tab by mouth two (2) times a day for 10 days. 20 Tab 0    metroNIDAZOLE (FLAGYL) 500 mg tablet Take 1 Tab by mouth three (3) times daily for 10 days. 30 Tab 0    pantoprazole (PROTONIX) 20 mg tablet Take 1 Tab by mouth daily. 30 Tab 0       Past History     Past Medical History:  Past Medical History:   Diagnosis Date    PUD (peptic ulcer disease)        Past Surgical History:  Past Surgical History:   Procedure Laterality Date    HX CHOLECYSTECTOMY      UPPER GI ENDOSCOPY,BIOPSY  8/19/2020            Family History:  History reviewed. No pertinent family history. Social History:  Social History     Tobacco Use    Smoking status: Current Every Day Smoker    Smokeless tobacco: Never Used   Substance Use Topics    Alcohol use: Yes     Comment: socially    Drug use: Never       Allergies:  No Known Allergies      Review of Systems   Review of Systems   Constitutional: Negative for fever. HENT: Negative for congestion. Eyes: Negative. Respiratory: Negative for shortness of breath. Cardiovascular: Negative for chest pain. Gastrointestinal: Positive for abdominal pain, diarrhea and nausea. Endocrine: Negative for heat intolerance. Genitourinary: Negative. Musculoskeletal: Negative for back pain. Skin: Negative for rash. Allergic/Immunologic: Negative for immunocompromised state. Neurological: Negative for dizziness. Hematological: Does not bruise/bleed easily. Psychiatric/Behavioral: Negative. All other systems reviewed and are negative. Physical Exam   Physical Exam  Vitals signs and nursing note reviewed. Constitutional:       General: He is not in acute distress. Appearance: He is well-developed. HENT:      Head: Normocephalic and atraumatic. Neck:      Musculoskeletal: Normal range of motion. Cardiovascular:      Rate and Rhythm: Normal rate and regular rhythm. Heart sounds: Normal heart sounds. Pulmonary:      Effort: Pulmonary effort is normal.      Breath sounds: Normal breath sounds. Abdominal:      General: Bowel sounds are normal.      Palpations: Abdomen is soft. Tenderness:  There is abdominal tenderness in the right upper quadrant and right lower quadrant. Skin:     General: Skin is warm and dry. Neurological:      General: No focal deficit present. Mental Status: He is alert and oriented to person, place, and time. Coordination: Coordination normal.   Psychiatric:         Mood and Affect: Mood normal.         Behavior: Behavior normal.         Diagnostic Study Results     Labs -     Recent Results (from the past 12 hour(s))   METABOLIC PANEL, COMPREHENSIVE    Collection Time: 08/27/20 12:06 PM   Result Value Ref Range    Sodium 138 136 - 145 mmol/L    Potassium 3.7 3.5 - 5.1 mmol/L    Chloride 103 97 - 108 mmol/L    CO2 33 (H) 21 - 32 mmol/L    Anion gap 2 (L) 5 - 15 mmol/L    Glucose 106 (H) 65 - 100 mg/dL    BUN 10 6 - 20 MG/DL    Creatinine 1.17 0.70 - 1.30 MG/DL    BUN/Creatinine ratio 9 (L) 12 - 20      GFR est AA >60 >60 ml/min/1.73m2    GFR est non-AA >60 >60 ml/min/1.73m2    Calcium 10.0 8.5 - 10.1 MG/DL    Bilirubin, total 0.3 0.2 - 1.0 MG/DL    ALT (SGPT) 57 12 - 78 U/L    AST (SGOT) 32 15 - 37 U/L    Alk.  phosphatase 100 45 - 117 U/L    Protein, total 8.5 (H) 6.4 - 8.2 g/dL    Albumin 4.0 3.5 - 5.0 g/dL    Globulin 4.5 (H) 2.0 - 4.0 g/dL    A-G Ratio 0.9 (L) 1.1 - 2.2     LIPASE    Collection Time: 08/27/20 12:06 PM   Result Value Ref Range    Lipase 543 (H) 73 - 393 U/L   CBC WITH AUTOMATED DIFF    Collection Time: 08/27/20 12:06 PM   Result Value Ref Range    WBC 15.1 (H) 4.1 - 11.1 K/uL    RBC 5.85 (H) 4.10 - 5.70 M/uL    HGB 16.2 12.1 - 17.0 g/dL    HCT 50.6 (H) 36.6 - 50.3 %    MCV 86.5 80.0 - 99.0 FL    MCH 27.7 26.0 - 34.0 PG    MCHC 32.0 30.0 - 36.5 g/dL    RDW 16.1 (H) 11.5 - 14.5 %    PLATELET 880 232 - 262 K/uL    MPV 9.9 8.9 - 12.9 FL    NRBC 0.0 0  WBC    ABSOLUTE NRBC 0.00 0.00 - 0.01 K/uL    NEUTROPHILS 64 32 - 75 %    LYMPHOCYTES 28 12 - 49 %    MONOCYTES 7 5 - 13 %    EOSINOPHILS 1 0 - 7 %    BASOPHILS 0 0 - 1 %    IMMATURE GRANULOCYTES 0 0.0 - 0.5 %    ABS. NEUTROPHILS 9.5 (H) 1.8 - 8.0 K/UL    ABS. LYMPHOCYTES 4.3 (H) 0.8 - 3.5 K/UL    ABS. MONOCYTES 1.1 (H) 0.0 - 1.0 K/UL    ABS. EOSINOPHILS 0.1 0.0 - 0.4 K/UL    ABS. BASOPHILS 0.1 0.0 - 0.1 K/UL    ABS. IMM. GRANS. 0.1 (H) 0.00 - 0.04 K/UL    DF AUTOMATED     POC LACTIC ACID    Collection Time: 08/27/20 12:08 PM   Result Value Ref Range    Lactic Acid (POC) 1.39 0.40 - 2.00 mmol/L       Radiologic Studies -   No orders to display     CT Results  (Last 48 hours)    None        CXR Results  (Last 48 hours)    None          Medical Decision Making   I am the first provider for this patient. I reviewed the vital signs, available nursing notes, past medical history, past surgical history, family history and social history. Vital Signs-Reviewed the patient's vital signs. Patient Vitals for the past 12 hrs:   Temp Pulse Resp BP SpO2   08/27/20 1143 97.3 °F (36.3 °C) 92 16 149/83 100 %         Records Reviewed: Nursing Notes, Old Medical Records, Previous Radiology Studies and Previous Laboratory Studies    Provider Notes (Medical Decision Making):   Duodenal ulcer with fluid collection, pseudocyst, pancreatitis, dehydration, electrolyte abnormality    ED Course:   Initial assessment performed. The patients presenting problems have been discussed, and they are in agreement with the care plan formulated and outlined with them. I have encouraged them to ask questions as they arise throughout their visit. Consult note:    I discussed the case with Dr. Jessica Jackson, general surgery. He has evaluated the patient. He recommends hospitalist admission and GI consult. Consult note:    Patient is being admitted by Tomy Way, hospitalist      Consult note:    A consult call has been placed to Dr. Selam Olvera, gastrointestinal specialist.    Dr. Selam Olvera will provide a consult           Critical Care Time:   0    Disposition:  admit    DISCHARGE PLAN:  1. Current Discharge Medication List        2.    Follow-up Information    None       3. Return to ED if worse     Diagnosis     Clinical Impression:   1. Duodenal abscess    2. Duodenal ulcer with perforation ([address]    3. Other acute pancreatitis with uninfected necrosis    4. Smoking        Attestations:    Christopher Lorenzo MD    Please note that this dictation was completed with [employer], the computer voice recognition software. Quite often unanticipated grammatical, syntax, homophones, and other interpretive errors are inadvertently transcribed by the computer software. Please disregard these errors. Please excuse any errors that have escaped final proofreading. Thank you.

## 2020-08-28 ENCOUNTER — ANESTHESIA EVENT (OUTPATIENT)
Dept: ENDOSCOPY | Age: 60
DRG: 282 | End: 2020-08-28
Payer: MEDICAID

## 2020-08-28 ENCOUNTER — ANESTHESIA (OUTPATIENT)
Dept: ENDOSCOPY | Age: 60
DRG: 282 | End: 2020-08-28
Payer: MEDICAID

## 2020-08-28 PROBLEM — K86.0 ALCOHOL-INDUCED CHRONIC PANCREATITIS (HCC): Status: ACTIVE | Noted: 2020-08-28

## 2020-08-28 PROBLEM — K86.3 PANCREATIC PSEUDOCYST: Status: ACTIVE | Noted: 2020-08-28

## 2020-08-28 LAB
ALBUMIN SERPL-MCNC: 3.4 G/DL (ref 3.5–5)
ALBUMIN/GLOB SERPL: 0.9 {RATIO} (ref 1.1–2.2)
ALP SERPL-CCNC: 86 U/L (ref 45–117)
ALT SERPL-CCNC: 43 U/L (ref 12–78)
ANION GAP SERPL CALC-SCNC: 3 MMOL/L (ref 5–15)
AST SERPL-CCNC: 30 U/L (ref 15–37)
BASOPHILS # BLD: 0.1 K/UL (ref 0–0.1)
BASOPHILS NFR BLD: 1 % (ref 0–1)
BILIRUB SERPL-MCNC: 0.6 MG/DL (ref 0.2–1)
BUN SERPL-MCNC: 10 MG/DL (ref 6–20)
BUN/CREAT SERPL: 9 (ref 12–20)
CALCIUM SERPL-MCNC: 9 MG/DL (ref 8.5–10.1)
CHLORIDE SERPL-SCNC: 106 MMOL/L (ref 97–108)
CO2 SERPL-SCNC: 31 MMOL/L (ref 21–32)
CREAT SERPL-MCNC: 1.16 MG/DL (ref 0.7–1.3)
DIFFERENTIAL METHOD BLD: ABNORMAL
EOSINOPHIL # BLD: 0.1 K/UL (ref 0–0.4)
EOSINOPHIL NFR BLD: 1 % (ref 0–7)
ERYTHROCYTE [DISTWIDTH] IN BLOOD BY AUTOMATED COUNT: 15.9 % (ref 11.5–14.5)
GLOBULIN SER CALC-MCNC: 4 G/DL (ref 2–4)
GLUCOSE SERPL-MCNC: 79 MG/DL (ref 65–100)
HCT VFR BLD AUTO: 48.5 % (ref 36.6–50.3)
HGB BLD-MCNC: 15.3 G/DL (ref 12.1–17)
IMM GRANULOCYTES # BLD AUTO: 0 K/UL (ref 0–0.04)
IMM GRANULOCYTES NFR BLD AUTO: 0 % (ref 0–0.5)
LYMPHOCYTES # BLD: 5.6 K/UL (ref 0.8–3.5)
LYMPHOCYTES NFR BLD: 48 % (ref 12–49)
MCH RBC QN AUTO: 27.6 PG (ref 26–34)
MCHC RBC AUTO-ENTMCNC: 31.5 G/DL (ref 30–36.5)
MCV RBC AUTO: 87.5 FL (ref 80–99)
MONOCYTES # BLD: 1.1 K/UL (ref 0–1)
MONOCYTES NFR BLD: 9 % (ref 5–13)
NEUTS SEG # BLD: 4.8 K/UL (ref 1.8–8)
NEUTS SEG NFR BLD: 41 % (ref 32–75)
NRBC # BLD: 0 K/UL (ref 0–0.01)
NRBC BLD-RTO: 0 PER 100 WBC
PLATELET # BLD AUTO: 323 K/UL (ref 150–400)
PMV BLD AUTO: 9.9 FL (ref 8.9–12.9)
POTASSIUM SERPL-SCNC: 3.9 MMOL/L (ref 3.5–5.1)
PROT SERPL-MCNC: 7.4 G/DL (ref 6.4–8.2)
RBC # BLD AUTO: 5.54 M/UL (ref 4.1–5.7)
RBC MORPH BLD: ABNORMAL
SODIUM SERPL-SCNC: 140 MMOL/L (ref 136–145)
WBC # BLD AUTO: 11.7 K/UL (ref 4.1–11.1)

## 2020-08-28 PROCEDURE — 85025 COMPLETE CBC W/AUTO DIFF WBC: CPT

## 2020-08-28 PROCEDURE — 99231 SBSQ HOSP IP/OBS SF/LOW 25: CPT | Performed by: SURGERY

## 2020-08-28 PROCEDURE — 65270000029 HC RM PRIVATE

## 2020-08-28 PROCEDURE — 80053 COMPREHEN METABOLIC PANEL: CPT

## 2020-08-28 PROCEDURE — 36415 COLL VENOUS BLD VENIPUNCTURE: CPT

## 2020-08-28 PROCEDURE — 76060000031 HC ANESTHESIA FIRST 0.5 HR: Performed by: INTERNAL MEDICINE

## 2020-08-28 PROCEDURE — 74011250636 HC RX REV CODE- 250/636: Performed by: HOSPITALIST

## 2020-08-28 PROCEDURE — 88305 TISSUE EXAM BY PATHOLOGIST: CPT

## 2020-08-28 PROCEDURE — 77030019988 HC FCPS ENDOSC DISP BSC -B: Performed by: INTERNAL MEDICINE

## 2020-08-28 PROCEDURE — 74011000250 HC RX REV CODE- 250: Performed by: HOSPITALIST

## 2020-08-28 PROCEDURE — 74011250636 HC RX REV CODE- 250/636: Performed by: NURSE ANESTHETIST, CERTIFIED REGISTERED

## 2020-08-28 PROCEDURE — 74011250636 HC RX REV CODE- 250/636: Performed by: NURSE PRACTITIONER

## 2020-08-28 PROCEDURE — C9113 INJ PANTOPRAZOLE SODIUM, VIA: HCPCS | Performed by: HOSPITALIST

## 2020-08-28 PROCEDURE — 0DB98ZX EXCISION OF DUODENUM, VIA NATURAL OR ARTIFICIAL OPENING ENDOSCOPIC, DIAGNOSTIC: ICD-10-PCS | Performed by: INTERNAL MEDICINE

## 2020-08-28 PROCEDURE — 74011250637 HC RX REV CODE- 250/637: Performed by: INTERNAL MEDICINE

## 2020-08-28 PROCEDURE — 74011000258 HC RX REV CODE- 258: Performed by: HOSPITALIST

## 2020-08-28 PROCEDURE — 76040000019: Performed by: INTERNAL MEDICINE

## 2020-08-28 RX ORDER — HYDROMORPHONE HYDROCHLORIDE 1 MG/ML
INJECTION, SOLUTION INTRAMUSCULAR; INTRAVENOUS; SUBCUTANEOUS
Status: CANCELLED | OUTPATIENT
Start: 2020-08-28

## 2020-08-28 RX ORDER — DEXTROMETHORPHAN/PSEUDOEPHED 2.5-7.5/.8
1.2 DROPS ORAL
Status: DISCONTINUED | OUTPATIENT
Start: 2020-08-28 | End: 2020-08-28 | Stop reason: HOSPADM

## 2020-08-28 RX ORDER — HYDROMORPHONE HYDROCHLORIDE 1 MG/ML
1 INJECTION, SOLUTION INTRAMUSCULAR; INTRAVENOUS; SUBCUTANEOUS
Status: DISCONTINUED | OUTPATIENT
Start: 2020-08-28 | End: 2020-08-29

## 2020-08-28 RX ORDER — FLUMAZENIL 0.1 MG/ML
0.2 INJECTION INTRAVENOUS
Status: DISCONTINUED | OUTPATIENT
Start: 2020-08-28 | End: 2020-08-28 | Stop reason: HOSPADM

## 2020-08-28 RX ORDER — ATROPINE SULFATE 0.1 MG/ML
0.5 INJECTION INTRAVENOUS
Status: DISCONTINUED | OUTPATIENT
Start: 2020-08-28 | End: 2020-08-28 | Stop reason: HOSPADM

## 2020-08-28 RX ORDER — SODIUM CHLORIDE 0.9 % (FLUSH) 0.9 %
5-40 SYRINGE (ML) INJECTION EVERY 8 HOURS
Status: DISCONTINUED | OUTPATIENT
Start: 2020-08-28 | End: 2020-08-31 | Stop reason: HOSPADM

## 2020-08-28 RX ORDER — SODIUM CHLORIDE 9 MG/ML
100 INJECTION, SOLUTION INTRAVENOUS CONTINUOUS
Status: DISPENSED | OUTPATIENT
Start: 2020-08-28 | End: 2020-08-28

## 2020-08-28 RX ORDER — EPINEPHRINE 0.1 MG/ML
1 INJECTION INTRACARDIAC; INTRAVENOUS
Status: DISCONTINUED | OUTPATIENT
Start: 2020-08-28 | End: 2020-08-28 | Stop reason: HOSPADM

## 2020-08-28 RX ORDER — SODIUM CHLORIDE 0.9 % (FLUSH) 0.9 %
5-40 SYRINGE (ML) INJECTION AS NEEDED
Status: DISCONTINUED | OUTPATIENT
Start: 2020-08-28 | End: 2020-08-31 | Stop reason: HOSPADM

## 2020-08-28 RX ORDER — PROPOFOL 10 MG/ML
INJECTION, EMULSION INTRAVENOUS AS NEEDED
Status: DISCONTINUED | OUTPATIENT
Start: 2020-08-28 | End: 2020-08-28 | Stop reason: HOSPADM

## 2020-08-28 RX ORDER — NALOXONE HYDROCHLORIDE 0.4 MG/ML
0.4 INJECTION, SOLUTION INTRAMUSCULAR; INTRAVENOUS; SUBCUTANEOUS
Status: DISCONTINUED | OUTPATIENT
Start: 2020-08-28 | End: 2020-08-28 | Stop reason: HOSPADM

## 2020-08-28 RX ORDER — MIDAZOLAM HYDROCHLORIDE 1 MG/ML
.25-5 INJECTION, SOLUTION INTRAMUSCULAR; INTRAVENOUS
Status: DISCONTINUED | OUTPATIENT
Start: 2020-08-28 | End: 2020-08-28 | Stop reason: HOSPADM

## 2020-08-28 RX ORDER — MIDAZOLAM HYDROCHLORIDE 1 MG/ML
.25-5 INJECTION, SOLUTION INTRAMUSCULAR; INTRAVENOUS
Status: DISCONTINUED | OUTPATIENT
Start: 2020-08-28 | End: 2020-08-28

## 2020-08-28 RX ORDER — HYDROMORPHONE HYDROCHLORIDE 1 MG/ML
0.5 INJECTION, SOLUTION INTRAMUSCULAR; INTRAVENOUS; SUBCUTANEOUS ONCE
Status: COMPLETED | OUTPATIENT
Start: 2020-08-28 | End: 2020-08-28

## 2020-08-28 RX ADMIN — SIMETHICONE 80 MG: 20 SUSPENSION/ DROPS ORAL at 10:39

## 2020-08-28 RX ADMIN — PIPERACILLIN AND TAZOBACTAM 3.38 G: 3; .375 INJECTION, POWDER, LYOPHILIZED, FOR SOLUTION INTRAVENOUS at 22:09

## 2020-08-28 RX ADMIN — SODIUM CHLORIDE 40 MG: 9 INJECTION, SOLUTION INTRAMUSCULAR; INTRAVENOUS; SUBCUTANEOUS at 09:26

## 2020-08-28 RX ADMIN — HYDROMORPHONE HYDROCHLORIDE 0.5 MG: 1 INJECTION, SOLUTION INTRAMUSCULAR; INTRAVENOUS; SUBCUTANEOUS at 14:50

## 2020-08-28 RX ADMIN — HYDROMORPHONE HYDROCHLORIDE 1 MG: 1 INJECTION, SOLUTION INTRAMUSCULAR; INTRAVENOUS; SUBCUTANEOUS at 18:43

## 2020-08-28 RX ADMIN — Medication 10 ML: at 22:11

## 2020-08-28 RX ADMIN — HYDROMORPHONE HYDROCHLORIDE 0.5 MG: 1 INJECTION, SOLUTION INTRAMUSCULAR; INTRAVENOUS; SUBCUTANEOUS at 06:34

## 2020-08-28 RX ADMIN — SODIUM CHLORIDE 100 ML/HR: 900 INJECTION, SOLUTION INTRAVENOUS at 22:14

## 2020-08-28 RX ADMIN — Medication 10 ML: at 14:50

## 2020-08-28 RX ADMIN — HYDROMORPHONE HYDROCHLORIDE 0.5 MG: 1 INJECTION, SOLUTION INTRAMUSCULAR; INTRAVENOUS; SUBCUTANEOUS at 03:05

## 2020-08-28 RX ADMIN — PIPERACILLIN AND TAZOBACTAM 3.38 G: 3; .375 INJECTION, POWDER, LYOPHILIZED, FOR SOLUTION INTRAVENOUS at 03:06

## 2020-08-28 RX ADMIN — PROPOFOL 100 MG: 10 INJECTION, EMULSION INTRAVENOUS at 10:31

## 2020-08-28 RX ADMIN — SODIUM CHLORIDE 40 MG: 9 INJECTION, SOLUTION INTRAMUSCULAR; INTRAVENOUS; SUBCUTANEOUS at 22:10

## 2020-08-28 RX ADMIN — PROPOFOL 50 MG: 10 INJECTION, EMULSION INTRAVENOUS at 10:39

## 2020-08-28 RX ADMIN — HYDROMORPHONE HYDROCHLORIDE 1 MG: 1 INJECTION, SOLUTION INTRAMUSCULAR; INTRAVENOUS; SUBCUTANEOUS at 22:11

## 2020-08-28 RX ADMIN — PROPOFOL 50 MG: 10 INJECTION, EMULSION INTRAVENOUS at 10:35

## 2020-08-28 RX ADMIN — PIPERACILLIN AND TAZOBACTAM 3.38 G: 3; .375 INJECTION, POWDER, LYOPHILIZED, FOR SOLUTION INTRAVENOUS at 12:25

## 2020-08-28 RX ADMIN — HYDROMORPHONE HYDROCHLORIDE 0.5 MG: 1 INJECTION, SOLUTION INTRAMUSCULAR; INTRAVENOUS; SUBCUTANEOUS at 12:05

## 2020-08-28 RX ADMIN — Medication 10 ML: at 22:10

## 2020-08-28 RX ADMIN — Medication 10 ML: at 06:34

## 2020-08-28 RX ADMIN — Medication 10 ML: at 22:12

## 2020-08-28 NOTE — PROGRESS NOTES
Bedside shift change report given to YOVANY Chamberlain (oncoming nurse) by YOVANY Tracy (offgoing nurse). Report included the following information SBAR and Kardex. Pt stable, reports pain lessening after dilaudid. Pt in bed resting, watching tv.

## 2020-08-28 NOTE — PROGRESS NOTES
TRANSFER - IN REPORT:    Verbal report received from [address] (name) on Lazarus Hitchcock  being received from General Surgery room 2176 (unit) for ordered procedure      Report consisted of patients Situation, Background, Assessment and   Recommendations(SBAR). Information from the following report(s) SBAR, Procedure Summary, Intake/Output, MAR and Recent Results was reviewed with the receiving nurse. Opportunity for questions and clarification was provided. Will give report to primary endoscopy nurse upon arrival to unit.

## 2020-08-28 NOTE — PROGRESS NOTES
Admit Date: 2020    POD Day of Surgery    Procedure:  Procedure(s):  ESOPHAGOGASTRODUODENAL (EGD) BIOPSY  ESOPHAGOGASTRODUODENOSCOPY (EGD)    Subjective:     Patient with less abdominal pain, discussion with patient through blue  phone. EGD today showed some mucosal edema medially, biopsies taken, GI to discuss with partners about possible endoscopic ultrasound. Review of Systems   Gastrointestinal:        Improving   All other systems reviewed and are negative. Improving abdominal pain otherwise negative review of systems  Objective:     Blood pressure 161/72, pulse 62, temperature 97.3 °F (36.3 °C), resp. rate 19, height 5' 11\" (1.803 m), weight 60.4 kg (133 lb 2.5 oz), SpO2 98 %. Temp (24hrs), Av.9 °F (36.6 °C), Min:97.3 °F (36.3 °C), Max:98.4 °F (36.9 °C)          Physical Exam  Vitals signs and nursing note reviewed. Constitutional:       General: He is not in acute distress. Appearance: Normal appearance. He is not ill-appearing. Cardiovascular:      Rate and Rhythm: Regular rhythm. Pulmonary:      Effort: Pulmonary effort is normal.   Abdominal:      General: Abdomen is flat. There is no distension. Palpations: Abdomen is soft. Tenderness: There is no abdominal tenderness. There is no guarding. Skin:     General: Skin is warm. Neurological:      General: No focal deficit present. Mental Status: He is alert and oriented to person, place, and time. Psychiatric:         Mood and Affect: Mood normal.         Behavior: Behavior normal.         Thought Content:  Thought content normal.         Judgment: Judgment normal.            Labs:   Recent Results (from the past 24 hour(s))   METABOLIC PANEL, COMPREHENSIVE    Collection Time: 20 12:06 PM   Result Value Ref Range    Sodium 138 136 - 145 mmol/L    Potassium 3.7 3.5 - 5.1 mmol/L    Chloride 103 97 - 108 mmol/L    CO2 33 (H) 21 - 32 mmol/L    Anion gap 2 (L) 5 - 15 mmol/L    Glucose 106 (H) 65 - 100 mg/dL    BUN 10 6 - 20 MG/DL    Creatinine 1.17 0.70 - 1.30 MG/DL    BUN/Creatinine ratio 9 (L) 12 - 20      GFR est AA >60 >60 ml/min/1.73m2    GFR est non-AA >60 >60 ml/min/1.73m2    Calcium 10.0 8.5 - 10.1 MG/DL    Bilirubin, total 0.3 0.2 - 1.0 MG/DL    ALT (SGPT) 57 12 - 78 U/L    AST (SGOT) 32 15 - 37 U/L    Alk. phosphatase 100 45 - 117 U/L    Protein, total 8.5 (H) 6.4 - 8.2 g/dL    Albumin 4.0 3.5 - 5.0 g/dL    Globulin 4.5 (H) 2.0 - 4.0 g/dL    A-G Ratio 0.9 (L) 1.1 - 2.2     LIPASE    Collection Time: 08/27/20 12:06 PM   Result Value Ref Range    Lipase 543 (H) 73 - 393 U/L   CBC WITH AUTOMATED DIFF    Collection Time: 08/27/20 12:06 PM   Result Value Ref Range    WBC 15.1 (H) 4.1 - 11.1 K/uL    RBC 5.85 (H) 4.10 - 5.70 M/uL    HGB 16.2 12.1 - 17.0 g/dL    HCT 50.6 (H) 36.6 - 50.3 %    MCV 86.5 80.0 - 99.0 FL    MCH 27.7 26.0 - 34.0 PG    MCHC 32.0 30.0 - 36.5 g/dL    RDW 16.1 (H) 11.5 - 14.5 %    PLATELET 061 502 - 639 K/uL    MPV 9.9 8.9 - 12.9 FL    NRBC 0.0 0  WBC    ABSOLUTE NRBC 0.00 0.00 - 0.01 K/uL    NEUTROPHILS 64 32 - 75 %    LYMPHOCYTES 28 12 - 49 %    MONOCYTES 7 5 - 13 %    EOSINOPHILS 1 0 - 7 %    BASOPHILS 0 0 - 1 %    IMMATURE GRANULOCYTES 0 0.0 - 0.5 %    ABS. NEUTROPHILS 9.5 (H) 1.8 - 8.0 K/UL    ABS. LYMPHOCYTES 4.3 (H) 0.8 - 3.5 K/UL    ABS. MONOCYTES 1.1 (H) 0.0 - 1.0 K/UL    ABS. EOSINOPHILS 0.1 0.0 - 0.4 K/UL    ABS. BASOPHILS 0.1 0.0 - 0.1 K/UL    ABS. IMM.  GRANS. 0.1 (H) 0.00 - 0.04 K/UL    DF AUTOMATED     POC LACTIC ACID    Collection Time: 08/27/20 12:08 PM   Result Value Ref Range    Lactic Acid (POC) 1.39 0.40 - 2.00 mmol/L   URINALYSIS W/ RFLX MICROSCOPIC    Collection Time: 08/27/20  1:34 PM   Result Value Ref Range    Color YELLOW/STRAW      Appearance TURBID (A) CLEAR      Specific gravity 1.019 1.003 - 1.030      pH (UA) 7.5 5.0 - 8.0      Protein Negative NEG mg/dL    Glucose Negative NEG mg/dL    Ketone Negative NEG mg/dL    Bilirubin Negative NEG Blood Negative NEG      Urobilinogen 0.2 0.2 - 1.0 EU/dL    Nitrites Negative NEG      Leukocyte Esterase Negative NEG     CEA    Collection Time: 08/27/20  7:12 PM   Result Value Ref Range    CEA 3.4 ng/mL   METABOLIC PANEL, COMPREHENSIVE    Collection Time: 08/28/20  6:02 AM   Result Value Ref Range    Sodium 140 136 - 145 mmol/L    Potassium 3.9 3.5 - 5.1 mmol/L    Chloride 106 97 - 108 mmol/L    CO2 31 21 - 32 mmol/L    Anion gap 3 (L) 5 - 15 mmol/L    Glucose 79 65 - 100 mg/dL    BUN 10 6 - 20 MG/DL    Creatinine 1.16 0.70 - 1.30 MG/DL    BUN/Creatinine ratio 9 (L) 12 - 20      GFR est AA >60 >60 ml/min/1.73m2    GFR est non-AA >60 >60 ml/min/1.73m2    Calcium 9.0 8.5 - 10.1 MG/DL    Bilirubin, total 0.6 0.2 - 1.0 MG/DL    ALT (SGPT) 43 12 - 78 U/L    AST (SGOT) 30 15 - 37 U/L    Alk. phosphatase 86 45 - 117 U/L    Protein, total 7.4 6.4 - 8.2 g/dL    Albumin 3.4 (L) 3.5 - 5.0 g/dL    Globulin 4.0 2.0 - 4.0 g/dL    A-G Ratio 0.9 (L) 1.1 - 2.2     CBC WITH AUTOMATED DIFF    Collection Time: 08/28/20  6:02 AM   Result Value Ref Range    WBC 11.7 (H) 4.1 - 11.1 K/uL    RBC 5.54 4.10 - 5.70 M/uL    HGB 15.3 12.1 - 17.0 g/dL    HCT 48.5 36.6 - 50.3 %    MCV 87.5 80.0 - 99.0 FL    MCH 27.6 26.0 - 34.0 PG    MCHC 31.5 30.0 - 36.5 g/dL    RDW 15.9 (H) 11.5 - 14.5 %    PLATELET 797 028 - 349 K/uL    MPV 9.9 8.9 - 12.9 FL    NRBC 0.0 0  WBC    ABSOLUTE NRBC 0.00 0.00 - 0.01 K/uL    NEUTROPHILS 41 32 - 75 %    LYMPHOCYTES 48 12 - 49 %    MONOCYTES 9 5 - 13 %    EOSINOPHILS 1 0 - 7 %    BASOPHILS 1 0 - 1 %    IMMATURE GRANULOCYTES 0 0.0 - 0.5 %    ABS. NEUTROPHILS 4.8 1.8 - 8.0 K/UL    ABS. LYMPHOCYTES 5.6 (H) 0.8 - 3.5 K/UL    ABS. MONOCYTES 1.1 (H) 0.0 - 1.0 K/UL    ABS. EOSINOPHILS 0.1 0.0 - 0.4 K/UL    ABS. BASOPHILS 0.1 0.0 - 0.1 K/UL    ABS. IMM.  GRANS. 0.0 0.00 - 0.04 K/UL    DF MANUAL      RBC COMMENTS NORMOCYTIC, NORMOCHROMIC         Data Review images and reports reviewed    Assessment:     Active Problems:    Smoking (8/27/2020)      Duodenal abscess (8/27/2020)      Alcohol-induced chronic pancreatitis ([facility] [address] (8/28/2020)      Pancreatic pseudocyst (8/28/2020)        Plan/Recommendations/Medical Decision Making:     Continue present treatment     The medial fluid collection in the duodenum likely represents pancreatitis pancreatic pseudocyst or other, less likely any malignancy, CEA normal, CA 19-9 pending but this is less likely malignancy. EGD performed today with medial second portion duodenal thickening biopsies pending, GI to discuss with partners about endoscopic ultrasound but so far nothing surgical to do. Advised patient to quit smoking, any surgical intervention is not indicated at this point, would likely require pancreatico duodenectomy which is not acutely indicated. Dr. Yeimi Kc will be following up with patient and is available over the weekend if needed.   Reviewed at length with patient by blue  phone and advised patient to quit smoking and continue to stay away from alcohol    Elliott Poe MD , [facility]

## 2020-08-28 NOTE — PROGRESS NOTES
I reviewed pertinent labs and imaging, and discussed /agreed on the plan of care with Dr. Mireille Leon. Hospitalist Progress Note    NAME: Joey Tierney   :  1960   MRN:  [id number]     Assessment / Plan:  Abdominal pain with recent hx of perforated duodenal ulcer   ? Pancreatitis with pseudocyst   · WBC 11.7; improved from 15.1  · CT abd/pel: Increase in size of the fluid collection associated with the duodenum as above. No other significant changes. · MRCP (2020):  1. 2.3 x 1.1 x 2.5 cm septated lobulated fluid in the medial wall of the second portion of the duodenum is decreased since 5 days ago but slightly increased since one day ago. Given the mural thickening of the duodenum and the surrounding inflammation, this finding most likely represents a contained perforation of a duodenal ulcer. 2. No solid mass or evidence of malignancy. 3. Mild biliary dilatation. No ampullary mass. No choledocholithiasis. · Lipase 543  · CEA 3.4  · Appreciate surgery input; for now this is non surgical  · Appreciate GI input; EGD today    · Continue PPI   · Continue Zosyn IV     History of ETOH abuse   Tobacco abuse   · Denies ETOH since 2019  · Current every day smoker   · Patient educated on the importance of smoking cessation and the health benefits associated with quitting. · Nicotine patch ordered     Mild protein calorie malnutrition   · BMI 18.5  · Patient endorses 5 lbs weight loss since last admission   · Consult dietician     18.5 - 24.9 Normal weight / Body mass index is 18.57 kg/m². Code status: Full  Prophylaxis: Lovenox  Recommended Disposition: Home w/Family     Subjective:     Chief Complaint / Reason for Physician Visit  Patient complaining of generalized abdominal pain. Reports BM this AM.  Discussed with RN events overnight.      Review of Systems:  Symptom Y/N Comments  Symptom Y/N Comments   Fever/Chills n   Chest Pain n    Poor Appetite y   Edema n    Cough n   Abdominal Pain y Sputum n   Joint Pain n    SOB/GARZA n   Pruritis/Rash     Nausea/vomit n   Tolerating PT/OT     Diarrhea n   Tolerating Diet     Constipation n   Other       Could NOT obtain due to:      Objective:     VITALS:   Last 24hrs VS reviewed since prior progress note. Most recent are:  Patient Vitals for the past 24 hrs:   Temp Pulse Resp BP SpO2   08/28/20 0957 98 °F (36.7 °C) 63 15 (!) 173/93 98 %   08/28/20 0918  (!) 59  144/74 99 %   08/27/20 2240 98 °F (36.7 °C) 65 16 131/85 97 %   08/27/20 2015 98.1 °F (36.7 °C) 70 16 153/88 97 %   08/27/20 1445 98.4 °F (36.9 °C) 75 16 148/83 100 %   08/27/20 1400 98.4 °F (36.9 °C) 77 15 156/83 96 %   08/27/20 1300  81 23 158/67 96 %   08/27/20 1143 97.3 °F (36.3 °C) 92 16 149/83 100 %       Intake/Output Summary (Last 24 hours) at 8/28/2020 1014  Last data filed at 8/28/2020 8183  Gross per 24 hour   Intake 200 ml   Output    Net 200 ml        PHYSICAL EXAM:  General: Pleasant frail  male. NAD   EENT:  EOMI. Anicteric sclerae. MMM  Resp:  CTA bilaterally, no wheezing or rales. No accessory muscle use  CV:  Regular rate rhythm,  No edema  GI:  Soft, tender to palpate +Bowel sounds  Neurologic:  Alert and oriented X 3, normal speech,   Psych:   Good insight. Not anxious nor agitated  Skin:  No rashes. No jaundice    Reviewed most current lab test results and cultures  YES  Reviewed most current radiology test results   YES  Review and summation of old records today    NO  Reviewed patient's current orders and MAR    YES  PMH/SH reviewed - no change compared to H&P  ________________________________________________________________________  Care Plan discussed with:    Comments   Patient x    Family      RN x    Care Manager     Consultant                        Multidiciplinary team rounds were held today with , nursing, pharmacist and clinical coordinator. Patient's plan of care was discussed; medications were reviewed and discharge planning was addressed. ________________________________________________________________________  Total NON critical care TIME:  26   Minutes    Total CRITICAL CARE TIME Spent:   Minutes non procedure based      Comments   >50% of visit spent in counseling and coordination of care     ________________________________________________________________________  Daphne Kuhn NP     Procedures: see electronic medical records for all procedures/Xrays and details which were not copied into this note but were reviewed prior to creation of Plan. LABS:  I reviewed today's most current labs and imaging studies.   Pertinent labs include:  Recent Labs     08/28/20  0602 08/27/20  1206   WBC 11.7* 15.1*   HGB 15.3 16.2   HCT 48.5 50.6*    376     Recent Labs     08/28/20  0602 08/27/20  1206    138   K 3.9 3.7    103   CO2 31 33*   GLU 79 106*   BUN 10 10   CREA 1.16 1.17   CA 9.0 10.0   ALB 3.4* 4.0   TBILI 0.6 0.3   ALT 43 57       Signed: Loal Beavers NP

## 2020-08-28 NOTE — ANESTHESIA PREPROCEDURE EVALUATION
Relevant Problems   No relevant active problems       Anesthetic History   No history of anesthetic complications            Review of Systems / Medical History  Patient summary reviewed and pertinent labs reviewed    Pulmonary  Within defined limits                 Neuro/Psych   Within defined limits           Cardiovascular  Within defined limits                Exercise tolerance: >4 METS     GI/Hepatic/Renal           PUD     Endo/Other  Within defined limits           Other Findings   Comments: Upper abdominal pain           Physical Exam    Airway  Mallampati: II  TM Distance: 4 - 6 cm  Neck ROM: normal range of motion   Mouth opening: Normal     Cardiovascular  Regular rate and rhythm,  S1 and S2 normal,  no murmur, click, rub, or gallop             Dental    Dentition: Loose teeth and Poor dentition  Comments: Numerous missing teeth and very loose tooth on bottom. He was informed that it may come out during the procedure.      Pulmonary  Breath sounds clear to auscultation               Abdominal  GI exam deferred       Other Findings            Anesthetic Plan    ASA: 2  Anesthesia type: MAC and total IV anesthesia          Induction: Intravenous  Anesthetic plan and risks discussed with: Patient

## 2020-08-28 NOTE — PROCEDURES
[facility]                   Endoscopic Gastroduodenoscopy Procedure Note      8/28/2020  Lindsey Guzman  1960  [id number]    Procedure: Endoscopic Gastroduodenoscopy with biopsy    Indication: Abdominal pain, fluid collection medial to duodenum. Rule out perforated du     Pre-operative Diagnosis: see indication above    Post-operative Diagnosis: see findings below    : KATIE David MD    Referring Provider:  None      Anesthesia/Sedation:  MAC anesthesia Propofol    Airway assessment: No airway problems anticipated    Pre-Procedural Exam:      Airway: clear, no airway problems anticipated  Heart: RRR, without gallops or rubs  Lungs: clear bilaterally without wheezes, crackles, or rhonchi  Abdomen: soft, nontender, nondistended, bowel sounds present  Mental Status: awake, alert and oriented to person, place and time       Procedure Details     After infomed consent was obtained for the procedure, with all risks and benefits of procedure explained the patient was taken to the endoscopy suite and placed in the left lateral decubitus position. Following sequential administration of sedation as per above, the endoscope was inserted into the mouth and advanced under direct vision to second portion of the duodenum. A careful inspection was made as the gastroscope was withdrawn, including a retroflexed view of the proximal stomach; findings and interventions are described below. Findings:   Esophagus:normal  Stomach: normal   Duodenum: Area of inflamed mucosa medial wall, second portion of duodenum about 3 by 5 cm. Some of mucosa heaped up, but all soft. Multiple biopsies obtained. Never saw Karly Borja. No visible ulcer    Therapies:  biopsy of duodenal second portion    Specimens: duodenal biopsies           Complications:   None; patient tolerated the procedure well. EBL:  None.             Impression:      -Inflamed duodenal mucosa as above, no ulcer see    Recommendations:  -Await pathology. , -Next step most likely will be EUS of pancrease. Will discuss with Dr. Chandler Caraballo.  Can start clear liquids    Angelo Clinton MD8/28/2020

## 2020-08-28 NOTE — CONSULTS
[facility]    Name:  Derrick Pollard  MR#:  [id number]  :  1960  ACCOUNT #:  [de-identified]  DATE OF SERVICE:  2020    REQUESTING PHYSICIAN:  Stephen Zhang MD    HISTORY OF PRESENT ILLNESS:  The patient is a 53-year-old gentleman readmitted to [facility] with extreme abdominal pain. The patient had been admitted a couple of weeks ago with abdominal pain and a CT scan which showed a fluid collection adjacent to the duodenum. Initial thought was this may have been a perforated ulcer with contained perforation. It was actually drained percutaneously by the radiologist, but apparently, the fluid was not sent for any laboratory studies including amylase testing. We were asked to see him in consultation at that time because on the CT scan, there was wall thickening and enhancement of the second portion of the duodenum with the adjacent 4 cm peripherally enhancing fluid collection, but there is also a question of an ampullary mass. The patient was known to our practice from prior evaluation in 2019 at [facility].  He had a history of alcohol abuse and prior cholecystectomy. During his hospitalization from 2019 to 2019 at [facility], he underwent upper endoscopy, which showed a medium hiatal hernia and localized changes of congestion. An endoscopic ultrasound at that time showed mucosal changes of the duodenum, but no sign of significant pathology in the common bile duct. There was hyperechoic material consistent with sludge in the gallbladder body. There was pancreatic parenchymal abnormalities consisting of hyperechoic strands and globularity in the genu of the pancreas, pancreatic body, and pancreatic tail. Pancreatic parenchymal abnormalities were also noted in the pancreatic head. Fine needle aspiration was performed.   The biopsy was negative for malignant cells, but there were numerous benign acinar cells present in the aspiration, and cell blot was shown to have possible narrow endocrine population. CA-19-9 level was 33, IgG4 level was elevated, lipase was elevated at that time. The patient also had undergone colonoscopy by Dr. Madison Ward on 03/10/2020, which was unremarkable. During his admission in mid August at [facility], an MRCP was obtained on 08/18. This showed the liver to have heterogeneous reticular enhancement in the arterial phase reactive to duodenal inflammation. There was prior cholecystectomy, common bile duct proximally was 8 mm, distally was 6 mm. There was no ampullary mass. The pancreas and pancreatic duct were within normal limits. In the bowel, there was a lobulated, septated, hyperintense T2 signal in the medial wall of the second portion of duodenum measuring 2.3 x 1.1 x 2.5 cm, which was felt to be slightly increased from the CT scan obtained the day before. There was mural thickening of the second portion of the duodenum and edema in the duodenum. We saw the patient in consultation and suggested upper endoscopy, but he was feeling better and wanted to leave with outpatient followup. He presented to his primary care physician today with increased abdominal pain and was sent to the emergency department for admission. He is supposed to follow up tomorrow with the surgeons to review a CT scan of the abdomen, which was obtained two days ago, on 08/25. This showed the fluid collection adjacent to the medial aspect of the proximal duodenum, which was increased in size to 31 x 17 mm from 21 x 11 mm. The patient denies fever or chills. He has had nausea and vomiting. The pain has all been in the upper abdomen, severe pain. It does radiate through to the back. Laboratory studies on admission here today showed WBC of 15.1, hemoglobin is normal at 16.2, lipase is elevated at 543.   Liver function tests are normal.    PAST MEDICAL HISTORY:  Includes alcohol abuse, pancreatic fluid collections as mentioned above. Drinking no alcohol since November. MEDICATIONS PRIOR TO ADMISSION:  Pantoprazole 20 mg daily. ALLERGIES:  NONE KNOWN. SOCIAL HISTORY:  He is . His wife is present in the room. Does smoke. Does not drink alcohol currently. PHYSICAL EXAMINATION:  GENERAL:  Shows to be a thin gentleman, in no acute distress. HEENT:  Sclerae anicteric. Oropharynx is clear. NECK:  Supple without JVD. LUNGS:  Clear to auscultation. CARDIAC:  Regular rate and rhythm. ABDOMEN:  Soft. There is some mild pain in the right upper quadrant to deep palpation. No rebound or guarding. SKIN:  No rash or jaundice. LABORATORY STUDIES:  WBC 15.1, hemoglobin 16.2, hematocrit 50.6, platelet count 452,864. Electrolytes within normal limits. Bilirubin is 0.3. ALT 57, AST 32. Lipase is 543, alkaline phosphatase is 100. CT scan as mentioned above. IMPRESSION:  1. I suspect he has ongoing pancreatitis with a pseudocyst.  Less likely, it would be a perforated duodenal ulcer with abscess. Pancreatic malignancy appears to be unlikely. 2.  History of alcohol abuse, none currently. PLAN:  1.  Dr. Jessica Jackson has ordered CEA and CA-19-9 levels. 2.  Upper endoscopy will be scheduled for tomorrow. 3.  Possibility of endoscopic ultrasound depending upon results of EGD and after discussion with Dr. Kevin Santos.         MD HIMANSHU [email or web address]/[email or web address]  D:  08/27/2020 17:19  T:  08/27/2020 21:57  JOB #:  [id number]

## 2020-08-28 NOTE — PROGRESS NOTES
1900--bedside report received from marek hernandez pt resting in bed oriented x 4, talking on phone, assessment as noted    1930--0.5mg dilaudid given per prn orders for pt c/o abd pain    0700--bedside report given to marek moser who is assuming care of pt

## 2020-08-28 NOTE — PROGRESS NOTES
Plan:  -Home with family   -F/u appts  -Family to transport at d/c       Reason for Readmission:     Abdominal pain          RUR Score/Risk Level:     10%/Low    PCP: First and Last name:  Dr. Viky Palma   Name of Practice: [facility]   Are you a current patient: Yes/No: Yes   Approximate date of last visit: Yesterday    Can you participate in a virtual visit with your PCP:     Is a Care Conference indicated: Not at this time      Did you attend your follow up appointment (s): If not, why not:Yes         Resources/supports as identified by patient/family:  Family support        Top Challenges facing patient (as identified by patient/family and CM): Finances/Medication cost?     No issues identified  Transportation     No issues identified   Support system or lack thereof? Family support       Living arrangements? With family         Self-care/ADLs/Cognition? Independent         Current Advanced Directive/Advance Care Plan:  Not on file           Plan for utilizing home health:   Likely none             Transition of Care Plan:    Home with family and f/u appts            2:29PM  *Blue  phone at bedside used to complete assessment as pt is primarily [place] speaking. * CM met with pt to complete assessment and discuss d/c planning. Wife at bedside (also only [place] speaking). Pt is a 65 yo male admitted for abdominal pain. Pt previously admitted at [address] [facility] 8/13-8/19 for duodenal ulcer. Pt reports being independent at baseline. He lives with his wife and brother. Pt states he has been up to the bathroom in his hospital room. Pt given new pt PCP appt with Dr. Viky Palma after last visit. Pt attended that appt yesterday and then was sent to the ED. Plan to d/c home with family and f/u appts when medically stable. Family to transport. CM will continue to follow and assist with d/c planning. Care Management Interventions  PCP Verified by CM: Yes(Dr. Denton)  Mode of Transport at Discharge:  Other (see comment)(Family )  Transition of Care Consult (CM Consult): Discharge Planning  Current Support Network: Lives with Spouse, Relative's Home  Confirm Follow Up Transport: Family  Discharge Location  Discharge Placement: (Likely home with family )      Ramiro Nieves MSW  Care Manager

## 2020-08-28 NOTE — PROGRESS NOTES
TRANSFER - OUT REPORT:    Verbal report given to Shelton Abreu RN(name) on Quincy Kwon  being transferred to [facility](unit) for routine progression of care       Report consisted of patients Situation, Background, Assessment and   Recommendations(SBAR). Information from the following report(s) Procedure Summary, MAR was reviewed with the receiving nurse. Lines:   Peripheral IV 08/27/20 Left Antecubital (Active)   Site Assessment Clean, dry, & intact 08/28/20 0337   Phlebitis Assessment 0 08/27/20 2052   Infiltration Assessment 0 08/27/20 2052   Dressing Status Clean, dry, & intact 08/27/20 2052   Dressing Type Transparent 08/27/20 2052   Hub Color/Line Status Pink; Infusing 08/28/20 8915        Opportunity for questions and clarification was provided.

## 2020-08-28 NOTE — PROGRESS NOTES
The risks and benefits of the bite block have been explained to patient. Patient verbalizes understanding. Patient with only 4 teeth in mouth. One of the bottom three is loose. Patient reports that he understands risk of loosing tooth.

## 2020-08-28 NOTE — ANESTHESIA POSTPROCEDURE EVALUATION
Procedure(s):  ESOPHAGOGASTRODUODENAL (EGD) BIOPSY  ESOPHAGOGASTRODUODENOSCOPY (EGD). MAC, total IV anesthesia    Anesthesia Post Evaluation        Patient location during evaluation: PACU  Note status: Adequate. Level of consciousness: responsive to verbal stimuli and sleepy but conscious  Pain management: satisfactory to patient  Airway patency: patent  Anesthetic complications: no  Cardiovascular status: acceptable  Respiratory status: acceptable  Hydration status: acceptable  Comments: +Post-Anesthesia Evaluation and Assessment    Patient: Jovan Summers MRN: [id number]  SSN: [id number]   YOB: 1960  Age: 64 y.o. Sex: male      Cardiovascular Function/Vital Signs    /72   Pulse 62   Temp 36.3 °C (97.3 °F)   Resp 19   Ht 5' 11\" (1.803 m)   Wt 60.4 kg (133 lb 2.5 oz)   SpO2 98%   BMI 18.57 kg/m²     Patient is status post Procedure(s):  ESOPHAGOGASTRODUODENAL (EGD) BIOPSY  ESOPHAGOGASTRODUODENOSCOPY (EGD). Nausea/Vomiting: Controlled. Postoperative hydration reviewed and adequate. Pain:  Pain Scale 1: Numeric (0 - 10) (08/28/20 1124)  Pain Intensity 1: 8(pain RUQ, pain that he came in with) (08/28/20 1124)   Managed. Neurological Status: At baseline. Mental Status and Level of Consciousness: Arousable. Pulmonary Status:   O2 Device: Room air (08/28/20 1108)   Adequate oxygenation and airway patent. Complications related to anesthesia: None    Post-anesthesia assessment completed. No concerns. Signed By: Lisa Dean MD    8/28/2020  Post anesthesia nausea and vomiting:  controlled      INITIAL Post-op Vital signs:   Vitals Value Taken Time   /85 8/28/2020 11:27 AM   Temp 36.3 °C (97.3 °F) 8/28/2020 10:51 AM   Pulse 61 8/28/2020 11:30 AM   Resp 14 8/28/2020 11:30 AM   SpO2 98 % 8/28/2020 11:29 AM   Vitals shown include unvalidated device data.

## 2020-08-29 LAB
ANION GAP SERPL CALC-SCNC: 3 MMOL/L (ref 5–15)
BUN SERPL-MCNC: 10 MG/DL (ref 6–20)
BUN/CREAT SERPL: 10 (ref 12–20)
CALCIUM SERPL-MCNC: 8.3 MG/DL (ref 8.5–10.1)
CANCER AG19-9 SERPL-ACNC: 7 U/ML (ref 0–35)
CHLORIDE SERPL-SCNC: 107 MMOL/L (ref 97–108)
CO2 SERPL-SCNC: 29 MMOL/L (ref 21–32)
CREAT SERPL-MCNC: 1 MG/DL (ref 0.7–1.3)
ERYTHROCYTE [DISTWIDTH] IN BLOOD BY AUTOMATED COUNT: 15.3 % (ref 11.5–14.5)
GLUCOSE SERPL-MCNC: 98 MG/DL (ref 65–100)
HCT VFR BLD AUTO: 41.9 % (ref 36.6–50.3)
HGB BLD-MCNC: 13.3 G/DL (ref 12.1–17)
LIPASE SERPL-CCNC: 409 U/L (ref 73–393)
MCH RBC QN AUTO: 27.4 PG (ref 26–34)
MCHC RBC AUTO-ENTMCNC: 31.7 G/DL (ref 30–36.5)
MCV RBC AUTO: 86.2 FL (ref 80–99)
NRBC # BLD: 0 K/UL (ref 0–0.01)
NRBC BLD-RTO: 0 PER 100 WBC
PLATELET # BLD AUTO: 305 K/UL (ref 150–400)
PMV BLD AUTO: 10.2 FL (ref 8.9–12.9)
POTASSIUM SERPL-SCNC: 3.8 MMOL/L (ref 3.5–5.1)
PROCALCITONIN SERPL-MCNC: <0.05 NG/ML
RBC # BLD AUTO: 4.86 M/UL (ref 4.1–5.7)
SODIUM SERPL-SCNC: 139 MMOL/L (ref 136–145)
WBC # BLD AUTO: 11.6 K/UL (ref 4.1–11.1)

## 2020-08-29 PROCEDURE — 84145 PROCALCITONIN (PCT): CPT

## 2020-08-29 PROCEDURE — 80048 BASIC METABOLIC PNL TOTAL CA: CPT

## 2020-08-29 PROCEDURE — 74011250636 HC RX REV CODE- 250/636: Performed by: NURSE PRACTITIONER

## 2020-08-29 PROCEDURE — 74011250636 HC RX REV CODE- 250/636: Performed by: HOSPITALIST

## 2020-08-29 PROCEDURE — 65270000029 HC RM PRIVATE

## 2020-08-29 PROCEDURE — 83690 ASSAY OF LIPASE: CPT

## 2020-08-29 PROCEDURE — 36415 COLL VENOUS BLD VENIPUNCTURE: CPT

## 2020-08-29 PROCEDURE — 74011000250 HC RX REV CODE- 250: Performed by: HOSPITALIST

## 2020-08-29 PROCEDURE — 74011000258 HC RX REV CODE- 258: Performed by: HOSPITALIST

## 2020-08-29 PROCEDURE — 85027 COMPLETE CBC AUTOMATED: CPT

## 2020-08-29 PROCEDURE — C9113 INJ PANTOPRAZOLE SODIUM, VIA: HCPCS | Performed by: HOSPITALIST

## 2020-08-29 RX ORDER — HYDROMORPHONE HYDROCHLORIDE 1 MG/ML
1 INJECTION, SOLUTION INTRAMUSCULAR; INTRAVENOUS; SUBCUTANEOUS
Status: DISCONTINUED | OUTPATIENT
Start: 2020-08-29 | End: 2020-08-31 | Stop reason: HOSPADM

## 2020-08-29 RX ADMIN — PIPERACILLIN AND TAZOBACTAM 3.38 G: 3; .375 INJECTION, POWDER, LYOPHILIZED, FOR SOLUTION INTRAVENOUS at 19:56

## 2020-08-29 RX ADMIN — PIPERACILLIN AND TAZOBACTAM 3.38 G: 3; .375 INJECTION, POWDER, LYOPHILIZED, FOR SOLUTION INTRAVENOUS at 03:15

## 2020-08-29 RX ADMIN — SODIUM CHLORIDE 40 MG: 9 INJECTION, SOLUTION INTRAMUSCULAR; INTRAVENOUS; SUBCUTANEOUS at 22:04

## 2020-08-29 RX ADMIN — PIPERACILLIN AND TAZOBACTAM 3.38 G: 3; .375 INJECTION, POWDER, LYOPHILIZED, FOR SOLUTION INTRAVENOUS at 13:13

## 2020-08-29 RX ADMIN — Medication 10 ML: at 19:55

## 2020-08-29 RX ADMIN — HYDRALAZINE HYDROCHLORIDE 10 MG: 20 INJECTION INTRAMUSCULAR; INTRAVENOUS at 15:57

## 2020-08-29 RX ADMIN — ENOXAPARIN SODIUM 30 MG: 30 INJECTION SUBCUTANEOUS at 10:29

## 2020-08-29 RX ADMIN — HYDROMORPHONE HYDROCHLORIDE 1 MG: 1 INJECTION, SOLUTION INTRAMUSCULAR; INTRAVENOUS; SUBCUTANEOUS at 14:52

## 2020-08-29 RX ADMIN — HYDROMORPHONE HYDROCHLORIDE 1 MG: 1 INJECTION, SOLUTION INTRAMUSCULAR; INTRAVENOUS; SUBCUTANEOUS at 19:55

## 2020-08-29 RX ADMIN — HYDROMORPHONE HYDROCHLORIDE 1 MG: 1 INJECTION, SOLUTION INTRAMUSCULAR; INTRAVENOUS; SUBCUTANEOUS at 01:28

## 2020-08-29 RX ADMIN — HYDROMORPHONE HYDROCHLORIDE 1 MG: 1 INJECTION, SOLUTION INTRAMUSCULAR; INTRAVENOUS; SUBCUTANEOUS at 10:28

## 2020-08-29 RX ADMIN — SODIUM CHLORIDE 40 MG: 9 INJECTION, SOLUTION INTRAMUSCULAR; INTRAVENOUS; SUBCUTANEOUS at 10:28

## 2020-08-29 RX ADMIN — Medication 10 ML: at 13:12

## 2020-08-29 RX ADMIN — HYDROMORPHONE HYDROCHLORIDE 1 MG: 1 INJECTION, SOLUTION INTRAMUSCULAR; INTRAVENOUS; SUBCUTANEOUS at 05:57

## 2020-08-29 NOTE — PROGRESS NOTES
I reviewed pertinent labs and imaging, and discussed /agreed on the plan of care with Dr. Shad Camp. Hospitalist Progress Note    NAME: Dean Lai   :  1960   MRN:  [id number]     Assessment / Plan:  Patient is Mosotho speaking.  phone used during assessment    Abdominal pain with recent hx of perforated duodenal ulcer   ? Pancreatitis with pseudocyst   · WBC 11.6; improved from 15.1  · CT abd/pel: Increase in size of the fluid collection associated with the duodenum as above. No other significant changes. · MRCP (2020):  1. 2.3 x 1.1 x 2.5 cm septated lobulated fluid in the medial wall of the second portion of the duodenum is decreased since 5 days ago but slightly increased since one day ago. Given the mural thickening of the duodenum and the surrounding inflammation, this finding most likely represents a contained perforation of a duodenal ulcer. 2. No solid mass or evidence of malignancy. 3. Mild biliary dilatation. No ampullary mass. No choledocholithiasis. · Lipase 409; trending down   · CEA 3.4  · Appreciate surgery input; for now this is non surgical  · Appreciate GI input  · 2020:  EGD showed duodenitis. Awaiting pathology, likely next step will be EUS of pancreas. · Continue PPI   · Continue Zosyn IV     History of ETOH abuse   Tobacco abuse   · Denies ETOH since 2019  · Current every day smoker   · Patient educated on the importance of smoking cessation and the health benefits associated with quitting. · Nicotine patch ordered     Mild protein calorie malnutrition   · BMI 18.5  · Patient endorses 5 lbs weight loss since last admission   · Consult dietician     18.5 - 24.9 Normal weight / Body mass index is 18.57 kg/m². Code status: Full  Prophylaxis: Lovenox  Recommended Disposition: Home w/Family     Subjective:     Chief Complaint / Reason for Physician Visit  Patient continues to complain of 7-8/10. Requesting diet to be advanced.   Discussed with RN events overnight. Review of Systems:  Symptom Y/N Comments  Symptom Y/N Comments   Fever/Chills n   Chest Pain n    Poor Appetite y   Edema n    Cough n   Abdominal Pain y    Sputum n   Joint Pain n    SOB/GARZA n   Pruritis/Rash     Nausea/vomit n   Tolerating PT/OT     Diarrhea n   Tolerating Diet     Constipation n   Other       Could NOT obtain due to:      Objective:     VITALS:   Last 24hrs VS reviewed since prior progress note. Most recent are:  Patient Vitals for the past 24 hrs:   Temp Pulse Resp BP SpO2   08/29/20 0717 98.6 °F (37 °C) 67 18 125/73 100 %   08/28/20 1824 98.1 °F (36.7 °C) 69 18 126/78 100 %   08/28/20 1418 97.4 °F (36.3 °C) 69 18 146/78 100 %   08/28/20 1147 97.8 °F (36.6 °C) 65 18 157/80 99 %   08/28/20 1124  62 19 161/72 98 %   08/28/20 1121  63 21 151/76 100 %   08/28/20 1115  63 15 135/87 99 %   08/28/20 1109  65 16 159/85    08/28/20 1108  67 17 155/90 99 %   08/28/20 1103  68 17 (!) 134/94 99 %   08/28/20 1100  70 14 130/80 100 %   08/28/20 1057  74 18 147/87 99 %   08/28/20 1054  73 11 125/81 99 %   08/28/20 1051 97.3 °F (36.3 °C) 77 19 119/77 98 %   08/28/20 1047  74 19 90/53 100 %   08/28/20 1042  83 15 102/74 100 %       Intake/Output Summary (Last 24 hours) at 8/29/2020 1034  Last data filed at 8/29/2020 0904  Gross per 24 hour   Intake 825 ml   Output    Net 825 ml        PHYSICAL EXAM:  General: No acute distress. Pleasant frail  male.    EENT:  EOMI. Anicteric sclerae. MMM  Resp:  LS CTA. No accessory muscle use  CV:  RRR,  No edema  GI:  Soft, tender to palpate +Bowel sounds  Neurologic:  Alert and oriented X 3, normal speech,   Psych:   Good insight. Not anxious nor agitated  Skin:  No rashes.   No jaundice    Reviewed most current lab test results and cultures  YES  Reviewed most current radiology test results   YES  Review and summation of old records today    NO  Reviewed patient's current orders and MAR    YES  PMH/SH reviewed - no change compared to H&P  ________________________________________________________________________  Care Plan discussed with:    Comments   Patient x    Family      RN x    Care Manager     Consultant                        Multidiciplinary team rounds were held today with , nursing, pharmacist and clinical coordinator. Patient's plan of care was discussed; medications were reviewed and discharge planning was addressed. ________________________________________________________________________  Total NON critical care TIME:  26   Minutes    Total CRITICAL CARE TIME Spent:   Minutes non procedure based      Comments   >50% of visit spent in counseling and coordination of care     ________________________________________________________________________  Tigre Allen NP     Procedures: see electronic medical records for all procedures/Xrays and details which were not copied into this note but were reviewed prior to creation of Plan. LABS:  I reviewed today's most current labs and imaging studies.   Pertinent labs include:  Recent Labs     08/29/20 0127 08/28/20  0602 08/27/20  1206   WBC 11.6* 11.7* 15.1*   HGB 13.3 15.3 16.2   HCT 41.9 48.5 50.6*    323 376     Recent Labs     08/29/20  0127 08/28/20  0602 08/27/20  1206    140 138   K 3.8 3.9 3.7    106 103   CO2 29 31 33*   GLU 98 79 106*   BUN 10 10 10   CREA 1.00 1.16 1.17   CA 8.3* 9.0 10.0   ALB  --  3.4* 4.0   TBILI  --  0.6 0.3   ALT  --  43 57       Signed: Tigre Allen NP

## 2020-08-29 NOTE — PROGRESS NOTES
Pharmacy Automatic Renal Dosing Protocol - Antimicrobials    Indication for Antimicrobials: intra abdominal infection     Current Regimen of Each Antimicrobial:  Zosyn 3.375g IV q8h (Start Date 20; Day # 37)    Previous Antimicrobial Therapy:    Significant Cultures:   none    Radiology / Imaging results: (X-ray, CT scan or MRI): none    Labs:  Recent Labs     20  0127 20  0602 20  1206   CREA 1.00 1.16 1.17   BUN 10 10 10   WBC 11.6* 11.7* 15.1*     Temp (24hrs), Av.5 °F (36.9 °C), Min:98.1 °F (36.7 °C), Max:98.7 °F (37.1 °C)      Is the Patient on Dialysis? No    Creatinine Clearance (mL/min):   CrCl (Actual Body Weight): 69.6  CrCl (Adjusted Body Weight): 79.9  CrCl (Ideal Body Weight): 86.8    Impression/Plan:   Dose appropriate  Antimicrobial stop date X7 days     Pharmacy will follow daily and adjust medications as appropriate for renal function and/or serum levels. Recommended duration of therapy  [email or web address]/[place]/[facility]/Pharmacy/Clinical%20Companion/Duration%20of%[email or web address]. docx    Renal Dosing  [email or web address]/[place]/[facility]/Pharmacy/Clinical%20Companion/Renal%[id number]. pdf

## 2020-08-29 NOTE — PROGRESS NOTES
GI Progress Note Yasmin Burleson for BhaveshreginoAugustine  Lova Dose :1960 Q:[id number]   ATTG: Dr. Kailash Hurtado   PCP: None  Date/Time:  2020 12:37 PM     Primary GI: Dr. Metzger (used to see Dr. Aniceto Boyd but recently moved up here)    Reason for following: abnormal CT, periduodenal fluid collection pancreatitis and pseudocyst suspected    Assessment:   · Pancreatic pseudocyst, likely cause of duodenal inflammation and fluid collection  · Ongoing abd pain, nausea     Plan:   · Await pathology from EGD  · May require EUS, Dr. Diana Segundo to evaluate next week (non-urgent)  · Continue BID PPI  · CLD  · D/C ETOH, tobacco going forward  · nothing else to add this weekend, Dr. Diana Segundo will resume care on Monday,      Subjective:   Discussed with patient via blue phone . We spent about 30 minutes reviewing his imaging, EGD findings and going back and forth with . He still has pain. Much worse after PO, and also nausea. Anti-emetics and pain meds work, but run out before [employer]' due for more. Review of Systems:  Symptom Y/N Comments  Symptom Y/N Comments   Fever/Chills    Chest Pain     Cough    Headaches     Sputum    Joint Pain     SOB/GARZA    Pruritis/Rash     Tolerating Diet    Other       Could NOT obtain due to:      Objective:   VITALS:   Last 24hrs VS reviewed since prior progress note. Most recent are:  Visit Vitals  /73   Pulse 67   Temp 98.6 °F (37 °C)   Resp 18   Ht 5' 11\" (1.803 m)   Wt 60.4 kg (133 lb 2.5 oz)   SpO2 100%   BMI 18.57 kg/m²       Intake/Output Summary (Last 24 hours) at 2020 1237  Last data filed at 2020 0904  Gross per 24 hour   Intake 650 ml   Output    Net 650 ml     PHYSICAL EXAM:  General: WD, WN. Alert, cooperative, no acute distress    HEENT: NC, Atraumatic. Anicteric sclerae. Lungs:  CTA Bilaterally. No Wheezing/Rhonchi/Rales.   Heart:  Regular  rhythm,  No murmur (), No Rubs, No Gallops  Abdomen Soft, Non distended, TTP in LUQ/midline.  +Bowel sounds, no HSM  Extremities: No c/c/e  Neurologic:  Alert and oriented X 3. No acute neurological distress   Psych:   Good insight. Not anxious nor agitated. Lab and Radiology Data Reviewed: (see below)    Medications Reviewed: (see below)  PMH/SH reviewed - no change compared to H&P  ________________________________________________________________________  Total time spent with patient: 35 minutes ________________________________________________________________________  Care Plan discussed with:  Patient x   Family     RN               Consultant:       Chidi Patterson MD     Procedures: see electronic medical records for all procedures/Xrays and details which were not copied into this note but were reviewed prior to creation of Plan. LABS:  Recent Labs     08/29/20 0127 08/28/20  0602   WBC 11.6* 11.7*   HGB 13.3 15.3   HCT 41.9 48.5    323     Recent Labs     08/29/20  0127 08/28/20  0602 08/27/20  1206    140 138   K 3.8 3.9 3.7    106 103   CO2 29 31 33*   BUN 10 10 10   CREA 1.00 1.16 1.17   GLU 98 79 106*   CA 8.3* 9.0 10.0     Recent Labs     08/29/20 0127 08/28/20  0602 08/27/20  1206   AP  --  86 100   TP  --  7.4 8.5*   ALB  --  3.4* 4.0   GLOB  --  4.0 4.5*   LPSE 409*  --  543*     No results for input(s): INR, PTP, APTT, INREXT in the last 72 hours. No results for input(s): FE, TIBC, PSAT, FERR in the last 72 hours. No results found for: FOL, RBCF  No results for input(s): PH, PCO2, PO2 in the last 72 hours. No results for input(s): CPK, CKMB in the last 72 hours.     No lab exists for component: TROPONINI  Lab Results   Component Value Date/Time    Color YELLOW/STRAW 08/27/2020 01:34 PM    Appearance TURBID (A) 08/27/2020 01:34 PM    Specific gravity 1.019 08/27/2020 01:34 PM    pH (UA) 7.5 08/27/2020 01:34 PM    Protein Negative 08/27/2020 01:34 PM    Glucose Negative 08/27/2020 01:34 PM    Ketone Negative 08/27/2020 01:34 PM    Bilirubin Negative 08/27/2020 01:34 PM Urobilinogen 0.2 08/27/2020 01:34 PM    Nitrites Negative 08/27/2020 01:34 PM    Leukocyte Esterase Negative 08/27/2020 01:34 PM       MEDICATIONS:  Current Facility-Administered Medications   Medication Dose Route Frequency    HYDROmorphone (PF) (DILAUDID) injection 1 mg  1 mg IntraVENous Q3H PRN    sodium chloride (NS) flush 5-40 mL  5-40 mL IntraVENous Q8H    sodium chloride (NS) flush 5-40 mL  5-40 mL IntraVENous PRN    sodium chloride (NS) flush 5-40 mL  5-40 mL IntraVENous Q8H    sodium chloride (NS) flush 5-40 mL  5-40 mL IntraVENous PRN    acetaminophen (TYLENOL) tablet 650 mg  650 mg Oral Q6H PRN    Or    acetaminophen (TYLENOL) suppository 650 mg  650 mg Rectal Q6H PRN    polyethylene glycol (MIRALAX) packet 17 g  17 g Oral DAILY PRN    promethazine (PHENERGAN) tablet 12.5 mg  12.5 mg Oral Q6H PRN    Or    ondansetron (ZOFRAN) injection 4 mg  4 mg IntraVENous Q6H PRN    enoxaparin (LOVENOX) injection 30 mg  30 mg SubCUTAneous DAILY    piperacillin-tazobactam (ZOSYN) 3.375 g in 0.9% sodium chloride (MBP/ADV) 100 mL  3.375 g IntraVENous Q8H    hydrALAZINE (APRESOLINE) 20 mg/mL injection 10 mg  10 mg IntraVENous Q6H PRN    pantoprazole (PROTONIX) 40 mg in 0.9% sodium chloride 10 mL injection  40 mg IntraVENous Q12H    nicotine (NICODERM CQ) 21 mg/24 hr patch 1 Patch  1 Patch TransDERmal DAILY

## 2020-08-29 NOTE — PROGRESS NOTES
1900--bedside report received from marek moser pt resting in bed oriented x 4, talking on phone, call bell in reach assessment as noted    2230--pt up to shower, back to bed call bell in reach, eating crackers    ---periodic c/o abd pain MAR---    0300--am labs drawn and sent to lab per orders     0700--bedside report given to marek Briones who is assuming care of pt

## 2020-08-29 NOTE — PROGRESS NOTES
Comprehensive Nutrition Assessment    Type and Reason for Visit: Initial, Positive nutrition screen, Consult    Nutrition Recommendations/Plan:   · Continue diet progression beyond GI Lite as able to optimize nutritional intake. · RD ordered Ensure Clear po BID with meals. Nutrition Assessment:     8/29;  Chart reviewed; med noted for abdominal pain with recent admit secondary to perforated duodenal ulcer, hx of ETOH abuse. Recent weight loss reported of 5 lbs since last admission within 30 days. Documented weight trends mostly stable +/-2 lbs. Diet has progressed to GI Lite. Good po intake on prior admission. Last Weight Metric  Weight Loss Metrics 8/27/2020 8/27/2020 8/13/2020   Today's Wt 133 lb 2.5 oz 134 lb 134 lb   BMI 18.57 kg/m2 18.69 kg/m2 18.69 kg/m2       Estimated Daily Nutrient Needs:  Energy (kcal):  1883 (BMR 1449 x 1. 3AF)  Protein (g):  72 (1.2 g/kg bw)       Fluid (ml/day):  1900 ml/day    Nutrition Related Findings:  Labs: stable, BM 8/27, readmission within 30 days      Wounds:       None documented    Current Nutrition Therapies:   DIET GI LITE (POST SURGICAL)  DIET NUTRITIONAL SUPPLEMENTS Breakfast, Dinner; Ensure Clear    Anthropometric Measures:  · Height:  5' 11\" (180.3 cm)  · Current Body Wt:  60.8 kg (134 lb 0.6 oz)    · Ideal Body Wt:  172 lbs:  77.9 %   · BMI Category:  Normal weight (BMI 18.5-24. 9)       Nutrition Diagnosis:   · Inadequate protein-energy intake related to (duodenal ulcer, pancreatitis) as evidenced by (slow diet progression, need for ONS)    Nutrition Interventions:   Food and/or Nutrient Delivery: Continue current diet, Start oral nutrition supplement  Nutrition Education and Counseling: No recommendations at this time  Coordination of Nutrition Care: Continued inpatient monitoring    Goals:  PO intake trend >50% of meals + 240 ml ONS next 3-5 days       Nutrition Monitoring and Evaluation:   Food/Nutrient Intake Outcomes: Diet advancement/tolerance, Food and nutrient intake, Supplement intake  Physical Signs/Symptoms Outcomes: Biochemical data, Weight, GI status    Discharge Planning:    Continue current diet, Continue oral nutrition supplement     Electronically signed by Melissa Arciniega RD on 8/29/2020 at 12:36 PM

## 2020-08-30 LAB
ANION GAP SERPL CALC-SCNC: 2 MMOL/L (ref 5–15)
BUN SERPL-MCNC: 9 MG/DL (ref 6–20)
BUN/CREAT SERPL: 9 (ref 12–20)
CALCIUM SERPL-MCNC: 8.5 MG/DL (ref 8.5–10.1)
CHLORIDE SERPL-SCNC: 107 MMOL/L (ref 97–108)
CO2 SERPL-SCNC: 29 MMOL/L (ref 21–32)
CREAT SERPL-MCNC: 1.01 MG/DL (ref 0.7–1.3)
ERYTHROCYTE [DISTWIDTH] IN BLOOD BY AUTOMATED COUNT: 15.1 % (ref 11.5–14.5)
GLUCOSE SERPL-MCNC: 84 MG/DL (ref 65–100)
HCT VFR BLD AUTO: 42.1 % (ref 36.6–50.3)
HGB BLD-MCNC: 13.5 G/DL (ref 12.1–17)
MCH RBC QN AUTO: 27.4 PG (ref 26–34)
MCHC RBC AUTO-ENTMCNC: 32.1 G/DL (ref 30–36.5)
MCV RBC AUTO: 85.4 FL (ref 80–99)
NRBC # BLD: 0 K/UL (ref 0–0.01)
NRBC BLD-RTO: 0 PER 100 WBC
PLATELET # BLD AUTO: 328 K/UL (ref 150–400)
PMV BLD AUTO: 9.8 FL (ref 8.9–12.9)
POTASSIUM SERPL-SCNC: 3.8 MMOL/L (ref 3.5–5.1)
RBC # BLD AUTO: 4.93 M/UL (ref 4.1–5.7)
SODIUM SERPL-SCNC: 138 MMOL/L (ref 136–145)
WBC # BLD AUTO: 11.3 K/UL (ref 4.1–11.1)

## 2020-08-30 PROCEDURE — 74011000250 HC RX REV CODE- 250: Performed by: HOSPITALIST

## 2020-08-30 PROCEDURE — 74011250636 HC RX REV CODE- 250/636: Performed by: NURSE PRACTITIONER

## 2020-08-30 PROCEDURE — 74011000258 HC RX REV CODE- 258: Performed by: HOSPITALIST

## 2020-08-30 PROCEDURE — 65270000029 HC RM PRIVATE

## 2020-08-30 PROCEDURE — C9113 INJ PANTOPRAZOLE SODIUM, VIA: HCPCS | Performed by: HOSPITALIST

## 2020-08-30 PROCEDURE — 80048 BASIC METABOLIC PNL TOTAL CA: CPT

## 2020-08-30 PROCEDURE — 74011250636 HC RX REV CODE- 250/636: Performed by: HOSPITALIST

## 2020-08-30 PROCEDURE — 36415 COLL VENOUS BLD VENIPUNCTURE: CPT

## 2020-08-30 PROCEDURE — 85027 COMPLETE CBC AUTOMATED: CPT

## 2020-08-30 RX ADMIN — Medication 10 ML: at 21:08

## 2020-08-30 RX ADMIN — Medication 10 ML: at 06:00

## 2020-08-30 RX ADMIN — HYDROMORPHONE HYDROCHLORIDE 1 MG: 1 INJECTION, SOLUTION INTRAMUSCULAR; INTRAVENOUS; SUBCUTANEOUS at 17:46

## 2020-08-30 RX ADMIN — SODIUM CHLORIDE 40 MG: 9 INJECTION, SOLUTION INTRAMUSCULAR; INTRAVENOUS; SUBCUTANEOUS at 21:07

## 2020-08-30 RX ADMIN — Medication 10 ML: at 14:02

## 2020-08-30 RX ADMIN — Medication 10 ML: at 21:09

## 2020-08-30 RX ADMIN — ENOXAPARIN SODIUM 30 MG: 30 INJECTION SUBCUTANEOUS at 09:56

## 2020-08-30 RX ADMIN — SODIUM CHLORIDE 40 MG: 9 INJECTION, SOLUTION INTRAMUSCULAR; INTRAVENOUS; SUBCUTANEOUS at 09:55

## 2020-08-30 RX ADMIN — PIPERACILLIN AND TAZOBACTAM 3.38 G: 3; .375 INJECTION, POWDER, LYOPHILIZED, FOR SOLUTION INTRAVENOUS at 20:41

## 2020-08-30 RX ADMIN — Medication 10 ML: at 02:44

## 2020-08-30 RX ADMIN — HYDROMORPHONE HYDROCHLORIDE 1 MG: 1 INJECTION, SOLUTION INTRAMUSCULAR; INTRAVENOUS; SUBCUTANEOUS at 13:58

## 2020-08-30 RX ADMIN — HYDROMORPHONE HYDROCHLORIDE 1 MG: 1 INJECTION, SOLUTION INTRAMUSCULAR; INTRAVENOUS; SUBCUTANEOUS at 09:56

## 2020-08-30 RX ADMIN — PIPERACILLIN AND TAZOBACTAM 3.38 G: 3; .375 INJECTION, POWDER, LYOPHILIZED, FOR SOLUTION INTRAVENOUS at 04:11

## 2020-08-30 RX ADMIN — HYDROMORPHONE HYDROCHLORIDE 1 MG: 1 INJECTION, SOLUTION INTRAMUSCULAR; INTRAVENOUS; SUBCUTANEOUS at 06:30

## 2020-08-30 RX ADMIN — PIPERACILLIN AND TAZOBACTAM 3.38 G: 3; .375 INJECTION, POWDER, LYOPHILIZED, FOR SOLUTION INTRAVENOUS at 13:58

## 2020-08-30 RX ADMIN — HYDROMORPHONE HYDROCHLORIDE 1 MG: 1 INJECTION, SOLUTION INTRAMUSCULAR; INTRAVENOUS; SUBCUTANEOUS at 02:44

## 2020-08-30 RX ADMIN — HYDROMORPHONE HYDROCHLORIDE 1 MG: 1 INJECTION, SOLUTION INTRAMUSCULAR; INTRAVENOUS; SUBCUTANEOUS at 21:07

## 2020-08-30 NOTE — PROGRESS NOTES
Problem: Pain  Goal: *Control of Pain  Outcome: Progressing Towards Goal  Goal: *PALLIATIVE CARE:  Alleviation of Pain  Outcome: Progressing Towards Goal     Problem: Patient Education: Go to Patient Education Activity  Goal: Patient/Family Education  Outcome: Progressing Towards Goal     Problem: General Medical Care Plan  Goal: *Vital signs within specified parameters  Outcome: Progressing Towards Goal  Goal: *Labs within defined limits  Outcome: Progressing Towards Goal  Goal: *Absence of infection signs and symptoms  Outcome: Progressing Towards Goal  Goal: *Optimal pain control at patient's stated goal  Outcome: Progressing Towards Goal  Goal: *Skin integrity maintained  Outcome: Progressing Towards Goal  Goal: *Fluid volume balance  Outcome: Progressing Towards Goal  Goal: *Optimize nutritional status  Outcome: Progressing Towards Goal  Goal: *Anxiety reduced or absent  Outcome: Progressing Towards Goal  Goal: *Progressive mobility and function (eg: ADL's)  Outcome: Progressing Towards Goal     Problem: Patient Education: Go to Patient Education Activity  Goal: Patient/Family Education  Outcome: Progressing Towards Goal     Problem: Falls - Risk of  Goal: *Absence of Falls  Description: Document Jez Fall Risk and appropriate interventions in the flowsheet.   Outcome: Progressing Towards Goal  Note: Fall Risk Interventions:            Medication Interventions: Patient to call before getting OOB                   Problem: Patient Education: Go to Patient Education Activity  Goal: Patient/Family Education  Outcome: Progressing Towards Goal

## 2020-08-30 NOTE — PROGRESS NOTES
Bedside shift change report given to [address] [facility] [address] (oncoming nurse) by Lisa PEDROZA (offgoing nurse). Report included the following information SBAR, Kardex, ED Summary, Intake/Output, MAR and Recent Results.

## 2020-08-30 NOTE — PROGRESS NOTES
Pt rested in bed comfortably throughout shift. Able to make needs known. Pt able to eat small meals and tolerate them well. IV is patent and infusing.  Bed low call light in reach, side rails up x2

## 2020-08-30 NOTE — PROGRESS NOTES
I reviewed pertinent labs and imaging, and discussed /agreed on the plan of care with Dr. Yohannes Edwards. Hospitalist Progress Note    NAME: Garrison Redding   :  1960   MRN:  [id number]     Assessment / Plan:  Patient is Kyrgyz speaking.  phone used during assessment    Abdominal pain with recent hx of perforated duodenal ulcer   ? Pancreatitis with pseudocyst   · WBC 11.3; improved from 15.1  · CT abd/pel: Increase in size of the fluid collection associated with the duodenum as above. No other significant changes. · MRCP (2020):  1. 2.3 x 1.1 x 2.5 cm septated lobulated fluid in the medial wall of the second portion of the duodenum is decreased since 5 days ago but slightly increased since one day ago. Given the mural thickening of the duodenum and the surrounding inflammation, this finding most likely represents a contained perforation of a duodenal ulcer. 2. No solid mass or evidence of malignancy. 3. Mild biliary dilatation. No ampullary mass. No choledocholithiasis. · Lipase 409; trending down   · CEA 3.4  · Appreciate surgery input; for now this is non surgical  · Appreciate GI input  · 2020:  EGD showed duodenitis. Awaiting pathology, likely next step will be EUS of pancreas. · Continue PPI   · Continue Zosyn IV   · Procalcitonin <0.05    History of ETOH abuse   Tobacco abuse   · Denies ETOH since 2019  · Current every day smoker   · Patient educated on the importance of smoking cessation and the health benefits associated with quitting. · Nicotine patch ordered     Mild protein calorie malnutrition   · BMI 18.5  · Patient endorses 5 lbs weight loss since last admission   · Consult dietician     18.5 - 24.9 Normal weight / Body mass index is 18.57 kg/m². Code status: Full  Prophylaxis: Lovenox  Recommended Disposition: Home w/Family     Subjective:     Chief Complaint / Reason for Physician Visit  Pleasant  male. NAD. Continues to complain of pain.   Discussed with RN events overnight. Review of Systems:  Symptom Y/N Comments  Symptom Y/N Comments   Fever/Chills n   Chest Pain n    Poor Appetite y   Edema n    Cough n   Abdominal Pain y    Sputum n   Joint Pain n    SOB/GARZA n   Pruritis/Rash     Nausea/vomit n   Tolerating PT/OT     Diarrhea n   Tolerating Diet     Constipation n   Other       Could NOT obtain due to:      Objective:     VITALS:   Last 24hrs VS reviewed since prior progress note. Most recent are:  Patient Vitals for the past 24 hrs:   Temp Pulse Resp BP SpO2   08/30/20 0915 97.9 °F (36.6 °C) 67 16 138/80 98 %   08/30/20 0325 97.9 °F (36.6 °C) 73 16 149/84 99 %   08/30/20 0243  72  145/78    08/29/20 2258 98.4 °F (36.9 °C) 67  139/67 100 %   08/29/20 1919 98.5 °F (36.9 °C) 74 16 128/68 100 %   08/29/20 1619 98.6 °F (37 °C) 68 18 132/73 99 %   08/29/20 1151 98.7 °F (37.1 °C) 68 18 127/73 99 %       Intake/Output Summary (Last 24 hours) at 8/30/2020 1001  Last data filed at 8/30/2020 0624  Gross per 24 hour   Intake 1745 ml   Output    Net 1745 ml        PHYSICAL EXAM:  General: Pleasant frail  male. NAD    EENT:  EOMI. Anicteric sclerae. MMM  Resp:  Lung sounds clear. No accessory muscle use  CV:  Regular rate rhythm,  No edema  GI:  Soft, tender to palpate +Bowel sounds  Neurologic:  Alert and oriented X 3, normal speech,   Psych:   Good insight. Not anxious nor agitated  Skin:  No rashes.   No jaundice    Reviewed most current lab test results and cultures  YES  Reviewed most current radiology test results   YES  Review and summation of old records today    NO  Reviewed patient's current orders and MAR    YES  PMH/SH reviewed - no change compared to H&P  ________________________________________________________________________  Care Plan discussed with:    Comments   Patient x    Family      RN x    Care Manager     Consultant                        Multidiciplinary team rounds were held today with , nursing, pharmacist and clinical coordinator. Patient's plan of care was discussed; medications were reviewed and discharge planning was addressed. ________________________________________________________________________  Total NON critical care TIME:  26   Minutes    Total CRITICAL CARE TIME Spent:   Minutes non procedure based      Comments   >50% of visit spent in counseling and coordination of care     ________________________________________________________________________  Shelbi Almonte NP     Procedures: see electronic medical records for all procedures/Xrays and details which were not copied into this note but were reviewed prior to creation of Plan. LABS:  I reviewed today's most current labs and imaging studies.   Pertinent labs include:  Recent Labs     08/30/20 0257 08/29/20  0127 08/28/20  0602   WBC 11.3* 11.6* 11.7*   HGB 13.5 13.3 15.3   HCT 42.1 41.9 48.5    305 323     Recent Labs     08/30/20 0257 08/29/20  0127 08/28/20  0602 08/27/20  1206    139 140 138   K 3.8 3.8 3.9 3.7    107 106 103   CO2 29 29 31 33*   GLU 84 98 79 106*   BUN 9 10 10 10   CREA 1.01 1.00 1.16 1.17   CA 8.5 8.3* 9.0 10.0   ALB  --   --  3.4* 4.0   TBILI  --   --  0.6 0.3   ALT  --   --  43 57       Signed: Shelbi Almonte NP

## 2020-08-30 NOTE — PROGRESS NOTES
Problem: Pain  Goal: *Control of Pain  Outcome: Progressing Towards Goal  Goal: *PALLIATIVE CARE:  Alleviation of Pain  Outcome: Progressing Towards Goal     Problem: Patient Education: Go to Patient Education Activity  Goal: Patient/Family Education  Outcome: Progressing Towards Goal     Problem: General Medical Care Plan  Goal: *Vital signs within specified parameters  Outcome: Progressing Towards Goal  Goal: *Labs within defined limits  Outcome: Progressing Towards Goal  Goal: *Absence of infection signs and symptoms  Outcome: Progressing Towards Goal  Goal: *Optimal pain control at patient's stated goal  Outcome: Progressing Towards Goal  Goal: *Skin integrity maintained  Outcome: Progressing Towards Goal  Goal: *Fluid volume balance  Outcome: Progressing Towards Goal  Goal: *Optimize nutritional status  Outcome: Progressing Towards Goal  Goal: *Anxiety reduced or absent  Outcome: Progressing Towards Goal  Goal: *Progressive mobility and function (eg: ADL's)  Outcome: Progressing Towards Goal     Problem: Patient Education: Go to Patient Education Activity  Goal: Patient/Family Education  Outcome: Progressing Towards Goal     Problem: Falls - Risk of  Goal: *Absence of Falls  Description: Document Jez Fall Risk and appropriate interventions in the flowsheet.   Outcome: Progressing Towards Goal  Note: Fall Risk Interventions:            Medication Interventions: Bed/chair exit alarm, Patient to call before getting OOB, Teach patient to arise slowly                   Problem: Patient Education: Go to Patient Education Activity  Goal: Patient/Family Education  Outcome: Progressing Towards Goal

## 2020-08-30 NOTE — PROGRESS NOTES
Pharmacy Automatic Renal Dosing Protocol - Antimicrobials    Indication for Antimicrobials: intra abdominal infection     Current Regimen of Each Antimicrobial:  Zosyn 3.375g IV q8h (Start Date 20; Day # 4)    Previous Antimicrobial Therapy:    Significant Cultures:   none    Radiology / Imaging results: (X-ray, CT scan or MRI): none    Labs:  Recent Labs     20  0127 20  0602 20  1206   CREA 1.00 1.16 1.17   BUN 10 10 10   WBC 11.6* 11.7* 15.1*     Temp (24hrs), Av.5 °F (36.9 °C), Min:98.1 °F (36.7 °C), Max:98.7 °F (37.1 °C)      Is the Patient on Dialysis? No    Creatinine Clearance (mL/min):   CrCl (Actual Body Weight): 69.6  CrCl (Adjusted Body Weight): 79.9  CrCl (Ideal Body Weight): 86.8    Impression/Plan:   Dose appropriate  Antimicrobial stop date X7 days     Pharmacy will follow daily and adjust medications as appropriate for renal function and/or serum levels. Recommended duration of therapy  http://[facility]/[place]/[facility]/Pharmacy/Clinical%20Companion/Duration%20of%20ABX%20therapy. docx    Renal Dosing  http://[facility]/[place]/[facility]/Pharmacy/Clinical%20Companion/Renal%[id number]. pdf

## 2020-08-31 ENCOUNTER — HOSPITAL ENCOUNTER (INPATIENT)
Age: 60
LOS: 7 days | Discharge: HOME | DRG: 282 | End: 2020-09-08
Attending: EMERGENCY MEDICINE | Admitting: EMERGENCY MEDICINE
Payer: MEDICAID

## 2020-08-31 ENCOUNTER — APPOINTMENT (OUTPATIENT)
Dept: CT IMAGING | Age: 60
DRG: 282 | End: 2020-08-31
Attending: PHYSICIAN ASSISTANT
Payer: MEDICAID

## 2020-08-31 VITALS
DIASTOLIC BLOOD PRESSURE: 73 MMHG | WEIGHT: 133.16 LBS | BODY MASS INDEX: 18.64 KG/M2 | HEART RATE: 65 BPM | HEIGHT: 71 IN | OXYGEN SATURATION: 100 % | SYSTOLIC BLOOD PRESSURE: 144 MMHG | TEMPERATURE: 97.8 F | RESPIRATION RATE: 16 BRPM

## 2020-08-31 DIAGNOSIS — R10.13 EPIGASTRIC PAIN: ICD-10-CM

## 2020-08-31 DIAGNOSIS — K86.3 PANCREATIC PSEUDOCYST: ICD-10-CM

## 2020-08-31 DIAGNOSIS — K86.0 ALCOHOL-INDUCED CHRONIC PANCREATITIS (HCC): Primary | ICD-10-CM

## 2020-08-31 DIAGNOSIS — R65.10 SIRS (SYSTEMIC INFLAMMATORY RESPONSE SYNDROME) (HCC): ICD-10-CM

## 2020-08-31 DIAGNOSIS — R10.11 RIGHT UPPER QUADRANT ABDOMINAL PAIN: ICD-10-CM

## 2020-08-31 DIAGNOSIS — R10.9 ACUTE ABDOMINAL PAIN: ICD-10-CM

## 2020-08-31 DIAGNOSIS — K63.0 DUODENAL ABSCESS: ICD-10-CM

## 2020-08-31 LAB
ALBUMIN SERPL-MCNC: 3.7 G/DL (ref 3.5–5)
ALBUMIN/GLOB SERPL: 0.8 {RATIO} (ref 1.1–2.2)
ALP SERPL-CCNC: 92 U/L (ref 45–117)
ALT SERPL-CCNC: 34 U/L (ref 12–78)
AMPHET UR QL SCN: NEGATIVE
ANION GAP SERPL CALC-SCNC: 4 MMOL/L (ref 5–15)
ANION GAP SERPL CALC-SCNC: 5 MMOL/L (ref 5–15)
APPEARANCE UR: CLEAR
AST SERPL-CCNC: 22 U/L (ref 15–37)
BACTERIA URNS QL MICRO: NEGATIVE /HPF
BARBITURATES UR QL SCN: NEGATIVE
BASOPHILS # BLD: 0.1 K/UL (ref 0–0.1)
BASOPHILS NFR BLD: 0 % (ref 0–1)
BENZODIAZ UR QL: NEGATIVE
BILIRUB SERPL-MCNC: 0.4 MG/DL (ref 0.2–1)
BILIRUB UR QL: NEGATIVE
BUN SERPL-MCNC: 8 MG/DL (ref 6–20)
BUN SERPL-MCNC: 9 MG/DL (ref 6–20)
BUN/CREAT SERPL: 7 (ref 12–20)
BUN/CREAT SERPL: 9 (ref 12–20)
CALCIUM SERPL-MCNC: 9.2 MG/DL (ref 8.5–10.1)
CALCIUM SERPL-MCNC: 9.7 MG/DL (ref 8.5–10.1)
CANNABINOIDS UR QL SCN: NEGATIVE
CHLORIDE SERPL-SCNC: 101 MMOL/L (ref 97–108)
CHLORIDE SERPL-SCNC: 102 MMOL/L (ref 97–108)
CO2 SERPL-SCNC: 31 MMOL/L (ref 21–32)
CO2 SERPL-SCNC: 32 MMOL/L (ref 21–32)
COCAINE UR QL SCN: NEGATIVE
COLOR UR: NORMAL
CREAT SERPL-MCNC: 1.02 MG/DL (ref 0.7–1.3)
CREAT SERPL-MCNC: 1.08 MG/DL (ref 0.7–1.3)
DIFFERENTIAL METHOD BLD: ABNORMAL
DRUG SCRN COMMENT,DRGCM: ABNORMAL
EOSINOPHIL # BLD: 0.1 K/UL (ref 0–0.4)
EOSINOPHIL NFR BLD: 1 % (ref 0–7)
EPITH CASTS URNS QL MICRO: NORMAL /LPF
ERYTHROCYTE [DISTWIDTH] IN BLOOD BY AUTOMATED COUNT: 15.9 % (ref 11.5–14.5)
ERYTHROCYTE [DISTWIDTH] IN BLOOD BY AUTOMATED COUNT: 15.9 % (ref 11.5–14.5)
ETHANOL SERPL-MCNC: <10 MG/DL
GLOBULIN SER CALC-MCNC: 4.6 G/DL (ref 2–4)
GLUCOSE SERPL-MCNC: 102 MG/DL (ref 65–100)
GLUCOSE SERPL-MCNC: 91 MG/DL (ref 65–100)
GLUCOSE UR STRIP.AUTO-MCNC: NEGATIVE MG/DL
HCT VFR BLD AUTO: 47.4 % (ref 36.6–50.3)
HCT VFR BLD AUTO: 49.1 % (ref 36.6–50.3)
HGB BLD-MCNC: 14.8 G/DL (ref 12.1–17)
HGB BLD-MCNC: 16.1 G/DL (ref 12.1–17)
HGB UR QL STRIP: NEGATIVE
HYALINE CASTS URNS QL MICRO: NORMAL /LPF (ref 0–5)
IMM GRANULOCYTES # BLD AUTO: 0.1 K/UL (ref 0–0.04)
IMM GRANULOCYTES NFR BLD AUTO: 1 % (ref 0–0.5)
KETONES UR QL STRIP.AUTO: NEGATIVE MG/DL
LACTATE BLD-SCNC: 1.22 MMOL/L (ref 0.4–2)
LEUKOCYTE ESTERASE UR QL STRIP.AUTO: NEGATIVE
LIPASE SERPL-CCNC: 399 U/L (ref 73–393)
LIPASE SERPL-CCNC: 512 U/L (ref 73–393)
LYMPHOCYTES # BLD: 3.5 K/UL (ref 0.8–3.5)
LYMPHOCYTES NFR BLD: 19 % (ref 12–49)
MCH RBC QN AUTO: 27.3 PG (ref 26–34)
MCH RBC QN AUTO: 27.6 PG (ref 26–34)
MCHC RBC AUTO-ENTMCNC: 31.2 G/DL (ref 30–36.5)
MCHC RBC AUTO-ENTMCNC: 32.8 G/DL (ref 30–36.5)
MCV RBC AUTO: 84.1 FL (ref 80–99)
MCV RBC AUTO: 87.3 FL (ref 80–99)
METHADONE UR QL: NEGATIVE
MONOCYTES # BLD: 0.9 K/UL (ref 0–1)
MONOCYTES NFR BLD: 5 % (ref 5–13)
NEUTS SEG # BLD: 14 K/UL (ref 1.8–8)
NEUTS SEG NFR BLD: 74 % (ref 32–75)
NITRITE UR QL STRIP.AUTO: NEGATIVE
NRBC # BLD: 0 K/UL (ref 0–0.01)
NRBC # BLD: 0 K/UL (ref 0–0.01)
NRBC BLD-RTO: 0 PER 100 WBC
NRBC BLD-RTO: 0 PER 100 WBC
OPIATES UR QL: POSITIVE
PCP UR QL: NEGATIVE
PH UR STRIP: 7.5 [PH] (ref 5–8)
PLATELET # BLD AUTO: 350 K/UL (ref 150–400)
PLATELET # BLD AUTO: 385 K/UL (ref 150–400)
PMV BLD AUTO: 9.6 FL (ref 8.9–12.9)
PMV BLD AUTO: 9.8 FL (ref 8.9–12.9)
POTASSIUM SERPL-SCNC: 3.6 MMOL/L (ref 3.5–5.1)
POTASSIUM SERPL-SCNC: 3.8 MMOL/L (ref 3.5–5.1)
PROT SERPL-MCNC: 8.3 G/DL (ref 6.4–8.2)
PROT UR STRIP-MCNC: NEGATIVE MG/DL
RBC # BLD AUTO: 5.43 M/UL (ref 4.1–5.7)
RBC # BLD AUTO: 5.84 M/UL (ref 4.1–5.7)
RBC #/AREA URNS HPF: NORMAL /HPF (ref 0–5)
SODIUM SERPL-SCNC: 137 MMOL/L (ref 136–145)
SODIUM SERPL-SCNC: 138 MMOL/L (ref 136–145)
SP GR UR REFRACTOMETRY: 1.01 (ref 1–1.03)
UA: UC IF INDICATED,UAUC: NORMAL
UROBILINOGEN UR QL STRIP.AUTO: 0.2 EU/DL (ref 0.2–1)
WBC # BLD AUTO: 10 K/UL (ref 4.1–11.1)
WBC # BLD AUTO: 18.6 K/UL (ref 4.1–11.1)
WBC URNS QL MICRO: NORMAL /HPF (ref 0–4)

## 2020-08-31 PROCEDURE — 74011250636 HC RX REV CODE- 250/636: Performed by: PHYSICIAN ASSISTANT

## 2020-08-31 PROCEDURE — 83690 ASSAY OF LIPASE: CPT

## 2020-08-31 PROCEDURE — 85027 COMPLETE CBC AUTOMATED: CPT

## 2020-08-31 PROCEDURE — 80053 COMPREHEN METABOLIC PANEL: CPT

## 2020-08-31 PROCEDURE — 96376 TX/PRO/DX INJ SAME DRUG ADON: CPT

## 2020-08-31 PROCEDURE — 74011000250 HC RX REV CODE- 250: Performed by: HOSPITALIST

## 2020-08-31 PROCEDURE — C9113 INJ PANTOPRAZOLE SODIUM, VIA: HCPCS | Performed by: HOSPITALIST

## 2020-08-31 PROCEDURE — 74177 CT ABD & PELVIS W/CONTRAST: CPT

## 2020-08-31 PROCEDURE — 74011000636 HC RX REV CODE- 636: Performed by: PHYSICIAN ASSISTANT

## 2020-08-31 PROCEDURE — 96375 TX/PRO/DX INJ NEW DRUG ADDON: CPT

## 2020-08-31 PROCEDURE — 85025 COMPLETE CBC W/AUTO DIFF WBC: CPT

## 2020-08-31 PROCEDURE — 74011000258 HC RX REV CODE- 258: Performed by: HOSPITALIST

## 2020-08-31 PROCEDURE — 93005 ELECTROCARDIOGRAM TRACING: CPT

## 2020-08-31 PROCEDURE — 74011000258 HC RX REV CODE- 258: Performed by: EMERGENCY MEDICINE

## 2020-08-31 PROCEDURE — 96365 THER/PROPH/DIAG IV INF INIT: CPT

## 2020-08-31 PROCEDURE — 87040 BLOOD CULTURE FOR BACTERIA: CPT

## 2020-08-31 PROCEDURE — 80307 DRUG TEST PRSMV CHEM ANLYZR: CPT

## 2020-08-31 PROCEDURE — 81001 URINALYSIS AUTO W/SCOPE: CPT

## 2020-08-31 PROCEDURE — 99285 EMERGENCY DEPT VISIT HI MDM: CPT

## 2020-08-31 PROCEDURE — 83605 ASSAY OF LACTIC ACID: CPT

## 2020-08-31 PROCEDURE — 74011000636 HC RX REV CODE- 636

## 2020-08-31 PROCEDURE — 74011250636 HC RX REV CODE- 250/636: Performed by: EMERGENCY MEDICINE

## 2020-08-31 PROCEDURE — 74011250636 HC RX REV CODE- 250/636: Performed by: NURSE PRACTITIONER

## 2020-08-31 PROCEDURE — 74011250636 HC RX REV CODE- 250/636: Performed by: HOSPITALIST

## 2020-08-31 PROCEDURE — 36415 COLL VENOUS BLD VENIPUNCTURE: CPT

## 2020-08-31 RX ORDER — OXYCODONE AND ACETAMINOPHEN 5; 325 MG/1; MG/1
1 TABLET ORAL
Qty: 12 TAB | Refills: 0 | Status: SHIPPED | OUTPATIENT
Start: 2020-08-31 | End: 2020-09-08

## 2020-08-31 RX ORDER — ONDANSETRON 4 MG/1
4 TABLET, FILM COATED ORAL
Qty: 20 TAB | Refills: 0 | Status: SHIPPED | OUTPATIENT
Start: 2020-08-31 | End: 2020-09-08

## 2020-08-31 RX ORDER — CIPROFLOXACIN 500 MG/1
500 TABLET ORAL 2 TIMES DAILY
Qty: 10 TAB | Refills: 0 | Status: SHIPPED | OUTPATIENT
Start: 2020-08-31 | End: 2020-09-08

## 2020-08-31 RX ORDER — ONDANSETRON 2 MG/ML
4 INJECTION INTRAMUSCULAR; INTRAVENOUS
Status: DISCONTINUED | OUTPATIENT
Start: 2020-08-31 | End: 2020-09-01 | Stop reason: SDUPTHER

## 2020-08-31 RX ORDER — ONDANSETRON 2 MG/ML
4 INJECTION INTRAMUSCULAR; INTRAVENOUS
Status: COMPLETED | OUTPATIENT
Start: 2020-08-31 | End: 2020-08-31

## 2020-08-31 RX ORDER — FENTANYL CITRATE 50 UG/ML
50 INJECTION, SOLUTION INTRAMUSCULAR; INTRAVENOUS
Status: COMPLETED | OUTPATIENT
Start: 2020-08-31 | End: 2020-08-31

## 2020-08-31 RX ORDER — MORPHINE SULFATE 4 MG/ML
4 INJECTION INTRAVENOUS
Status: COMPLETED | OUTPATIENT
Start: 2020-08-31 | End: 2020-09-01

## 2020-08-31 RX ORDER — MORPHINE SULFATE 4 MG/ML
4 INJECTION INTRAVENOUS ONCE
Status: COMPLETED | OUTPATIENT
Start: 2020-08-31 | End: 2020-08-31

## 2020-08-31 RX ORDER — IBUPROFEN 200 MG
1 TABLET ORAL DAILY
Qty: 30 PATCH | Refills: 0 | Status: SHIPPED | OUTPATIENT
Start: 2020-09-01 | End: 2020-09-01

## 2020-08-31 RX ORDER — OXYCODONE AND ACETAMINOPHEN 5; 325 MG/1; MG/1
1 TABLET ORAL
Qty: 12 TAB | Refills: 0 | Status: SHIPPED | OUTPATIENT
Start: 2020-08-31 | End: 2020-08-31 | Stop reason: SDUPTHER

## 2020-08-31 RX ORDER — METRONIDAZOLE 500 MG/1
500 TABLET ORAL 3 TIMES DAILY
Qty: 15 TAB | Refills: 0 | Status: SHIPPED | OUTPATIENT
Start: 2020-08-31 | End: 2020-09-08

## 2020-08-31 RX ORDER — ONDANSETRON 2 MG/ML
4 INJECTION INTRAMUSCULAR; INTRAVENOUS
Status: DISCONTINUED | OUTPATIENT
Start: 2020-08-31 | End: 2020-08-31

## 2020-08-31 RX ADMIN — IOPAMIDOL 100 ML: 755 INJECTION, SOLUTION INTRAVENOUS at 22:23

## 2020-08-31 RX ADMIN — PIPERACILLIN AND TAZOBACTAM 3.38 G: 3; .375 INJECTION, POWDER, LYOPHILIZED, FOR SOLUTION INTRAVENOUS at 23:35

## 2020-08-31 RX ADMIN — Medication 10 ML: at 05:00

## 2020-08-31 RX ADMIN — SODIUM CHLORIDE 1000 ML: 900 INJECTION, SOLUTION INTRAVENOUS at 21:18

## 2020-08-31 RX ADMIN — SODIUM CHLORIDE 40 MG: 9 INJECTION, SOLUTION INTRAMUSCULAR; INTRAVENOUS; SUBCUTANEOUS at 09:21

## 2020-08-31 RX ADMIN — ONDANSETRON 4 MG: 2 INJECTION INTRAMUSCULAR; INTRAVENOUS at 21:17

## 2020-08-31 RX ADMIN — PIPERACILLIN AND TAZOBACTAM 3.38 G: 3; .375 INJECTION, POWDER, LYOPHILIZED, FOR SOLUTION INTRAVENOUS at 04:52

## 2020-08-31 RX ADMIN — FENTANYL CITRATE 50 MCG: 50 INJECTION, SOLUTION INTRAMUSCULAR; INTRAVENOUS at 22:10

## 2020-08-31 RX ADMIN — MORPHINE SULFATE 4 MG: 4 INJECTION, SOLUTION INTRAMUSCULAR; INTRAVENOUS at 23:29

## 2020-08-31 RX ADMIN — ENOXAPARIN SODIUM 30 MG: 30 INJECTION SUBCUTANEOUS at 09:22

## 2020-08-31 RX ADMIN — IOHEXOL 50 ML: 240 INJECTION, SOLUTION INTRATHECAL; INTRAVASCULAR; INTRAVENOUS; ORAL at 21:19

## 2020-08-31 RX ADMIN — ONDANSETRON 4 MG: 2 INJECTION INTRAMUSCULAR; INTRAVENOUS at 09:22

## 2020-08-31 RX ADMIN — HYDROMORPHONE HYDROCHLORIDE 1 MG: 1 INJECTION, SOLUTION INTRAMUSCULAR; INTRAVENOUS; SUBCUTANEOUS at 04:52

## 2020-08-31 RX ADMIN — HYDROMORPHONE HYDROCHLORIDE 1 MG: 1 INJECTION, SOLUTION INTRAMUSCULAR; INTRAVENOUS; SUBCUTANEOUS at 01:45

## 2020-08-31 RX ADMIN — MORPHINE SULFATE 4 MG: 4 INJECTION, SOLUTION INTRAMUSCULAR; INTRAVENOUS at 21:18

## 2020-08-31 RX ADMIN — HYDROMORPHONE HYDROCHLORIDE 1 MG: 1 INJECTION, SOLUTION INTRAMUSCULAR; INTRAVENOUS; SUBCUTANEOUS at 08:01

## 2020-08-31 NOTE — PROGRESS NOTES
General Surgery End of Shift Nursing Note    Bedside shift change report given to [facility] (oncoming nurse) by Kushal Degroot (offgoing nurse). Report included the following information SBAR, Kardex, Intake/Output and MAR. Shift worked:   7a-7p   Summary of shift:    Pt had an uneventful shift. Pt had Dilaudid for pain every 3 hours. Pt is tolerating antibiotics. Pt is tolerating diet. Issues for physician to address:   none     Number times ambulated in hallway past shift: 0    Number of times OOB to chair past shift: 0    Pain Management:  Current medication: Diuladid  Patient states pain is manageable on current pain medication: YES    GI:    Current diet:  DIET GI LITE (POST SURGICAL)  DIET NUTRITIONAL SUPPLEMENTS Breakfast, Dinner; Ensure Clear    Tolerating current diet: YES  Passing flatus: YES  Last Bowel Movement:    Appearance: unknown    Respiratory:    Incentive Spirometer at bedside: YES  Patient instructed on use: YES    Patient Safety:    Falls Score: 1  Bed Alarm On? No  Sitter?  No    Lola Talbot

## 2020-08-31 NOTE — DISCHARGE INSTRUCTIONS
Patient Education        Noah Alma Rosa de la pancreatitis aguda  Learning About Acute Pancreatitis  ¿Qué es la pancreatitis aguda? El páncreas es un órgano que se encuentra detrás del [facility]. Produce hormonas jitendra la insulina que ayudan a controlar la [employer] en que el organismo utiliza el azúcar. También produce enzimas que ayudan a digerir los alimentos. Por lo general, estas enzimas pasan del páncreas a los intestinos. Alexandrea si se filtran al páncreas, pueden irritarlo y causar dolor e hinchazón. Cuando esto sucede de repente, se conoce jitendra pancreatitis aguda. ¿Qué la causa? La mayoría de las veces, la pancreatitis aguda es causada por cálculos biliares o por el consumo excesivo de alcohol. Alexandrea hay otras cosas que pueden causarla, tales jitendra:  · Altos niveles de grasas en la [place]. · Serina [place]. · Un problema después de ministerio cirugía o un procedimiento. · Determinados medicamentos. ¿Cuáles son los síntomas? El principal síntoma es el dolor en la parte superior del abdomen. El dolor puede ser intenso. Usted también puede tener fiebre, náuseas o vómitos. Algunas personas se enferman tanto que tienen problemas para respirar. También pueden tener baja la presión arterial.  ¿Cómo se diagnostica? El médico diagnosticará la pancreatitis por medio de un examen y [employer] de las pruebas de laboratorio. Bruno médico puede pensar que usted tiene william problema basándose en christina síntomas y en dónde tiene dolor en el abdomen. Pueden hacerle análisis de laura para [employer] niveles de enzimas llamadas amilasa y lipasa. En la pancreatitis, los niveles de estas enzimas suelen ser mucho más elevados de lo normal.  También pueden hacerle pruebas por imágenes del abdomen. Estas pueden incluir ministerio ecografía, ministerio tomografía computarizada o ministerio resonancia magnética.  Ministerio resonancia magnética especial llamada colangiopancreatografía por resonancia magnética (MRCP, por christina siglas en inglés) puede mostrar imágenes de los conductos biliares. Esta prueba puede ser muy útil cuando los cálculos biliares son la causa del problema. ¿Cómo se trata? El tratamiento de la pancreatitis aguda suele hacerse en el hospital. Se centra en el tratamiento del dolor y en proporcionar apoyo al cuerpo mientras el páncreas victor manuel. En casos graves, el tratamiento puede hacerse en ministerio unidad de cuidados intensivos para asistir con la respiración, tratar las infecciones graves, o ayudar a elevar la presión arterial si esta es muy baja. Si un cálculo biliar es la causa del problema, puede ser necesario eliminarlo. [place] se hace lisa un procedimiento conocido jitendra colangiopancreatografía retrógrada endoscópica (ERCP, por christina siglas en inglés). El [place] introduce un endoscopio en la boca y lo mueve suavemente por el estómago haciéndolo llegar [employer] conductos del páncreas y la vesícula biliar. El médico puede entonces olegario un cálculo y eliminarlo. A veces es necesario extirpar la vesícula biliar, la cual produce los cálculos biliares, por medio de ministerio operación. Las personas con pancreatitis a menudo necesitan mucho líquido para ayudar a mantener el funcionamiento de los otros órganos y estabilizar la presión arterial. Se les administran líquidos en ministerio vena (por vía intravenosa o IV). En vez de recibir alimentos por la boca, la nutrición a veces se administra por vía intravenosa mientras el páncreas victor manuel. La atención de seguimiento es ministerio parte clave de schwartz tratamiento y seguridad. Asegúrese de hacer y acudir a todas las citas, y llame a schwartz médico si está teniendo problemas. También es ministerio buena idea saber los resultados de christina exámenes y mantener ministerio lista de los medicamentos que heydi. ¿Dónde puede encontrar más información en inglés? Karin Light a http://km-donn.info/  Josselin Johnson B297 en la búsqueda para aprender más acerca de \"Aprenda acerca de la pancreatitis aguda. \"  Revisado: 12 candi, 4015               LMMEBMU del contenido: 12.5  © 7643-6856 Healthwise, Incorporated. Las instrucciones de cuidado fueron adaptadas bajo licencia por [employer] (which disclaims liability or warranty for this information). Si usted tiene [place] afección médica o sobre estas instrucciones, siempre pregunte a schwartz profesional de jun. Healthwise, Incorporated niega toda garantía o responsabilidad por schwartz uso de esta información. HOSPITALIST DISCHARGE INSTRUCTIONS    NAME: Naima Hyman   :  1960   MRN:  [id number]     Date/Time:  2020 9:51 AM    ADMIT DATE: 2020   DISCHARGE DATE: 2020     Attending Physician: Zoya Griffin NP    DISCHARGE DIAGNOSIS:  C/Ronan [address] Problems    Diagnosis Date Noted    Alcohol-induced chronic pancreatitis ([facility] [address] 2020    Pancreatic pseudocyst 2020    Smoking 2020    Duodenal abscess 2020     Medications: Per above medication reconciliation. Pain Management: per above medications    Recommended diet: Regular Diet    Recommended activity: Activity as tolerated    Wound care: None    Indwelling devices:  None    Supplemental Oxygen: None    Code status: Full        Outside physician follow up: Follow-up Information     Follow up With Specialties Details Why Contact Info    None    None (395) Patient stated that they have no PCP      Harper Dick MD Gastroenterology Schedule an appointment as soon as possible for a visit  [facility] [address] [id number]  [phone]            Information obtained by :  I understand that if any problems occur once I am at home I am to contact my physician. I understand and acknowledge receipt of the instructions indicated above.                                                                                                                                            Physician's or R.N.'s Signature Date/Time                                                                                                                                              Patient or Repres

## 2020-08-31 NOTE — PROGRESS NOTES
Problem: Pain  Goal: *Control of Pain  Outcome: Progressing Towards Goal  Goal: *PALLIATIVE CARE:  Alleviation of Pain  Outcome: Progressing Towards Goal     Problem: Patient Education: Go to Patient Education Activity  Goal: Patient/Family Education  Outcome: Progressing Towards Goal     Problem: General Medical Care Plan  Goal: *Vital signs within specified parameters  Outcome: Progressing Towards Goal  Goal: *Labs within defined limits  Outcome: Progressing Towards Goal  Goal: *Absence of infection signs and symptoms  Outcome: Progressing Towards Goal  Goal: *Optimal pain control at patient's stated goal  Outcome: Progressing Towards Goal  Goal: *Skin integrity maintained  Outcome: Progressing Towards Goal  Goal: *Fluid volume balance  Outcome: Progressing Towards Goal  Goal: *Optimize nutritional status  Outcome: Progressing Towards Goal  Goal: *Anxiety reduced or absent  Outcome: Progressing Towards Goal  Goal: *Progressive mobility and function (eg: ADL's)  Outcome: Progressing Towards Goal     Problem: Patient Education: Go to Patient Education Activity  Goal: Patient/Family Education  Outcome: Progressing Towards Goal     Problem: Falls - Risk of  Goal: *Absence of Falls  Description: Document Jez Fall Risk and appropriate interventions in the flowsheet.   Outcome: Progressing Towards Goal  Note: Fall Risk Interventions:            Medication Interventions: Patient to call before getting OOB, Teach patient to arise slowly                   Problem: Patient Education: Go to Patient Education Activity  Goal: Patient/Family Education  Outcome: Progressing Towards Goal

## 2020-08-31 NOTE — PROGRESS NOTES
Problem: Pain  Goal: *Control of Pain  Outcome: Progressing Towards Goal  Goal: *PALLIATIVE CARE:  Alleviation of Pain  Outcome: Progressing Towards Goal     Problem: Patient Education: Go to Patient Education Activity  Goal: Patient/Family Education  Outcome: Progressing Towards Goal     Problem: General Medical Care Plan  Goal: *Vital signs within specified parameters  Outcome: Progressing Towards Goal  Goal: *Labs within defined limits  Outcome: Progressing Towards Goal  Goal: *Absence of infection signs and symptoms  Outcome: Progressing Towards Goal  Goal: *Optimal pain control at patient's stated goal  Outcome: Progressing Towards Goal  Goal: *Skin integrity maintained  Outcome: Progressing Towards Goal  Goal: *Fluid volume balance  Outcome: Progressing Towards Goal  Goal: *Optimize nutritional status  Outcome: Progressing Towards Goal  Goal: *Anxiety reduced or absent  Outcome: Progressing Towards Goal  Goal: *Progressive mobility and function (eg: ADL's)  Outcome: Progressing Towards Goal     Problem: Patient Education: Go to Patient Education Activity  Goal: Patient/Family Education  Outcome: Progressing Towards Goal     Problem: Falls - Risk of  Goal: *Absence of Falls  Description: Document Jez Fall Risk and appropriate interventions in the flowsheet.   Outcome: Progressing Towards Goal  Note: Fall Risk Interventions:            Medication Interventions: Teach patient to arise slowly, Patient to call before getting OOB                   Problem: Patient Education: Go to Patient Education Activity  Goal: Patient/Family Education  Outcome: Progressing Towards Goal

## 2020-08-31 NOTE — DISCHARGE SUMMARY
Hospitalist Discharge Summary     Patient ID:  Sunni Levin  [id number]  79 y.o.  1960  8/27/2020    PCP on record: None    Admit date: 8/27/2020  Discharge date and time: 8/31/2020    DISCHARGE DIAGNOSIS:    Active Hospital Problems    Diagnosis Date Noted    Alcohol-induced chronic pancreatitis ([address] 08/28/2020    Pancreatic pseudocyst 08/28/2020    Smoking 08/27/2020    Duodenal abscess 08/27/2020     CONSULTATIONS:  None    Excerpted HPI from H&P of Ofelia Ivey MD:  Feliciano Larson is a 61-year-old Frisian speaking male who was  recently admitted and was discharged on 08/19/2020 when he was found to have a perforated duodenal ulcer with complex fluid collection. At that time, the patient was initially started on PPIs and broad-spectrum antibiotics, but was noted to have chemical peritonitis with possible periampullary mass. Subsequently, the patient had an MRI of the abdomen done, which confirmed no mass. GI was consulted and they recommended to not do endoscopy. The patient was discharged home on PPI and 10 days of antibiotics.   The patient states that he restarted having pain from last Saturday that is about four days ago and it progressively got worse. His abdominal pain was mainly in the epigastric region to the point that now his abdominal pain is about 8/10 in intensity, sharp, non-radiating associated with nausea, but no vomiting. He did not have any fever. No cough, no headache or dizziness, no changes in bowel movements. About two days ago, the patient had a followup CT scan done, which revealed an increase in the fluid collection near the abdomen. Also because of his progressively worsening abdominal pain, the patient came into the ER. \"  ______________________________________________________________________  DISCHARGE SUMMARY/HOSPITAL COURSE:  for full details see H&P, daily progress notes, labs, consult notes.    Jaqueline Proic y.o. female was admitted to [facility] on 8/27/2020 and treated for the following medical complaints:     Abdominal pain with recent hx of perforated duodenal ulcer   ? Pancreatitis with pseudocyst   · No leukocytosis   · CT abd/pel: Increase in size of the fluid collection associated with the duodenum as above. No other significant changes. · MRCP (08/18/2020):  1. 2.3 x 1.1 x 2.5 cm septated lobulated fluid in the medial wall of the second portion of the duodenum is decreased since 5 days ago but slightly increased since one day ago. Given the mural thickening of the duodenum and the surrounding inflammation, this finding most likely represents a contained perforation of a duodenal ulcer. 2. No solid mass or evidence of malignancy. 3. Mild biliary dilatation. No ampullary mass. No choledocholithiasis. · Lipase 399; trending down   · CEA 3.4  · Appreciate surgery input; for now this is non surgical  · Appreciate GI input  · 08/28/2020:  EGD showed duodenitis. Awaiting pathology, likely next step will be EUS of pancreas. · Continue PPI   · Received Zosyn IV; discharge home on PO flagyl and Cipro  · Procalcitonin <0.05     History of ETOH abuse   Tobacco abuse   · Denies ETOH since November 2019  · Current every day smoker   · Patient educated on the importance of smoking cessation and the health benefits associated with quitting. · Nicotine patch ordered      Mild protein calorie malnutrition   · BMI 18.5  · Patient endorses 5 lbs weight loss since last admission   · Consult dietician     Patient's plan of care has been reviewed with them. Patient and/or family have verbally conveyed their understanding and agreement of the patient's signs, symptoms, diagnosis, treatment and prognosis and additionally agree to follow up as recommended or return to [facility] should their condition change prior to follow-up.   Discharge instructions have also been provided to the patient with some educational information regarding their diagnosis as well a list of reasons why they would want to return to the office prior to their follow-up appointment should their condition change. Leodan Dougherty to avoid frequent ED visits. [facility] can treat; pains, sprains, cuts, wounds, high fevers, upper respiratory infections and much more. There medical team is equipped with all the tools necessary to provide advanced medical care in the comfort of you home, workplace, or location of need. The medical team consists of doctors, nurse practitioners, and EMTs. [email or web address] is available 7 days per week 9 am to 9 pm.   Request care by calling [phone] or by going online at [phone] [employer]ar  _______________________________________________________________________  Patient seen and examined by me on discharge day. Pertinent Findings:  Gen:    Not in distress  Chest: Clear lungs  CVS:   Regular rhythm. No edema  Abd:  Soft, not distended, not tender  Neuro:  Alert, oriented  _______________________________________________________________________  DISCHARGE MEDICATIONS:   Current Discharge Medication List      START taking these medications    Details   nicotine (NICODERM CQ) 21 mg/24 hr 1 Patch by TransDERmal route daily for 30 days. Qty: 30 Patch, Refills: 0      ondansetron hcl (Zofran) 4 mg tablet Take 1 Tab by mouth every eight (8) hours as needed for Nausea or Vomiting. Qty: 20 Tab, Refills: 0      oxyCODONE-acetaminophen (Percocet) 5-325 mg per tablet Take 1 Tab by mouth every six (6) hours as needed for Pain for up to 3 days. Max Daily Amount: 4 Tabs. Qty: 12 Tab, Refills: 0    Associated Diagnoses: Duodenal ulcer with perforation ([address] Other acute pancreatitis with uninfected necrosis; Duodenal abscess         CONTINUE these medications which have CHANGED    Details   metroNIDAZOLE (FlagyL) 500 mg tablet Take 1 Tab by mouth three (3) times daily for 5 days.   Qty: 15 Tab, Refills: 0      ciprofloxacin HCl (Cipro) 500 mg tablet Take 1 Tab by mouth two (2) times a day for 5 days. Qty: 10 Tab, Refills: 0         CONTINUE these medications which have NOT CHANGED    Details   pantoprazole (PROTONIX) 20 mg tablet Take 1 Tab by mouth daily. Qty: 30 Tab, Refills: 0               Patient Follow Up Instructions: Activity: Activity as tolerated  Diet: Regular Diet  Wound Care: None needed    Follow-up with PCP in 1 week.     Follow-up Information     Follow up With Specialties Details Why Contact Info    None    None (395) Patient stated that they have no PCP      Reji Ardon MD Gastroenterology Schedule an appointment as soon as possible for a visit  [place] [address]  [phone]          ________________________________________________________________    Risk of deterioration: Moderate    Condition at Discharge:  Stable  __________________________________________________________________    Disposition  Home with family, no needs    ____________________________________________________________________    Code Status: Full Code  ___________________________________________________________________      Total time in minutes spent coordinating this discharge (includes going over instructions, follow-up, prescriptions, and preparing report for sign off to her PCP) :  31 minutes    Signed:  Fatou Hamilton NP

## 2020-08-31 NOTE — PROGRESS NOTES
Plan:  -Home with family   -F/u appts  -Family to transport at d/c       10:30AM  D/c order acknowledged by CM Pt to d/c home with wife and brother. Pt independent at baseline. Has been up to the bathroom during admission. PCP appt scheduled and added to AVS. Pt ready for d/c from CM perspective. Family to transport home at d/c. Care Management Interventions  PCP Verified by CM: Yes(Dr. Denton)  Mode of Transport at Discharge: Other (see comment)(Family )  Transition of Care Consult (CM Consult): Discharge Planning  Current Support Network: Lives with Spouse, Relative's Home  Confirm Follow Up Transport: Family  Discharge Location  Discharge Placement: (Likely home with family )      CM available for any additional needs.        Trilby Closs, MSW  Care Manager

## 2020-08-31 NOTE — PROGRESS NOTES
Problem: Pain  Goal: *Control of Pain  8/31/2020 1017 by Medinaard Seip  Outcome: Resolved/Met  8/31/2020 1009 by Medinaard Seip  Outcome: Progressing Towards Goal  Goal: *PALLIATIVE CARE:  Alleviation of Pain  8/31/2020 1017 by Medina Seip  Outcome: Resolved/Met  8/31/2020 1009 by Medinaard Seip  Outcome: Progressing Towards Goal     Problem: Patient Education: Go to Patient Education Activity  Goal: Patient/Family Education  8/31/2020 1017 by Ricard Seip  Outcome: Resolved/Met  8/31/2020 1009 by Medina Seip  Outcome: Progressing Towards Goal     Problem: General Medical Care Plan  Goal: *Vital signs within specified parameters  8/31/2020 1017 by Ricard Seip  Outcome: Resolved/Met  8/31/2020 1009 by Medinaard Seip  Outcome: Progressing Towards Goal  Goal: *Labs within defined limits  8/31/2020 1017 by Medniaard Seip  Outcome: Resolved/Met  8/31/2020 1009 by Medinaard Seip  Outcome: Progressing Towards Goal  Goal: *Absence of infection signs and symptoms  8/31/2020 1017 by Ricard Seip  Outcome: Resolved/Met  8/31/2020 1009 by Medinaard Seip  Outcome: Progressing Towards Goal  Goal: *Optimal pain control at patient's stated goal  8/31/2020 1017 by Ricard Seip  Outcome: Resolved/Met  8/31/2020 1009 by Medinaard Seip  Outcome: Progressing Towards Goal  Goal: *Skin integrity maintained  8/31/2020 1017 by Medinaard Seip  Outcome: Resolved/Met  8/31/2020 1009 by Medinaard Seip  Outcome: Progressing Towards Goal  Goal: *Fluid volume balance  8/31/2020 1017 by Ricard Seip  Outcome: Resolved/Met  8/31/2020 1009 by Medinaard Seip  Outcome: Progressing Towards Goal  Goal: *Optimize nutritional status  8/31/2020 1017 by Ricard Seip  Outcome: Resolved/Met  8/31/2020 1009 by Medinaard Seip  Outcome: Progressing Towards Goal  Goal: *Anxiety reduced or absent  8/31/2020 1017 by Ricard Seip  Outcome: Resolved/Met  8/31/2020 1009 by Gillermina Duane  Outcome: Progressing Towards Goal  Goal: *Progressive mobility and function (eg: ADL's)  8/31/2020 1017 by Gillermina Duane  Outcome: Resolved/Met  8/31/2020 1009 by Gillermina Duane  Outcome: Progressing Towards Goal     Problem: Patient Education: Go to Patient Education Activity  Goal: Patient/Family Education  8/31/2020 1017 by Gillermina Duane  Outcome: Resolved/Met  8/31/2020 1009 by Gillermina Duane  Outcome: Progressing Towards Goal     Problem: Falls - Risk of  Goal: *Absence of Falls  Description: Document [facility] Fall Risk and appropriate interventions in the flowsheet.   8/31/2020 1017 by Gillermina Duane  Outcome: Resolved/Met  Note: Fall Risk Interventions:            Medication Interventions: Patient to call before getting OOB, Teach patient to arise slowly                8/31/2020 1009 by Gillermina Duane  Outcome: Progressing Towards Goal  Note: Fall Risk Interventions:            Medication Interventions: Patient to call before getting OOB, Teach patient to arise slowly                   Problem: Patient Education: Go to Patient Education Activity  Goal: Patient/Family Education  8/31/2020 1017 by Gillermina Duane  Outcome: Resolved/Met  8/31/2020 1009 by Gillermina Duane  Outcome: Progressing Towards Goal

## 2020-08-31 NOTE — PROGRESS NOTES
Pt discharged home. Pt IV removed. Pt was not on telemetry. Pt provided with discharge education and paperwork in [place]. Pt prescriptions sent to preferred pharmacy, pt provided with hardcopy prescription for pain medication. Pt states that he understands his discharge instructions. Pt states that he has all of his personal belongings at time of discharge. Pt left the unit walking with family and the RN.

## 2020-08-31 NOTE — PROGRESS NOTES
General Surgery End of Shift Nursing Note    Bedside shift change report given to Mckenna Wilson RN (oncoming nurse) by Ej Ivey (offgoing nurse). Report included the following information SBAR, Kardex, Intake/Output, MAR and Recent Results. Shift worked:   7p-7a   Summary of shift:    Pt c/o constant pain in abd, medicated x3, see MAR. Pt very hungry, requesting snacks frequently. Tolerating diet well. Antibiotics tolerated well, no concerning labs/significant events to report. Issues for physician to address:   none     Number times ambulated in hallway past shift: 0    Number of times OOB to chair past shift: 0    Pain Management:  Current medication: Dilaudid  Patient states pain is manageable on current pain medication: YES    GI:    Current diet:  DIET GI LITE (POST SURGICAL)  DIET NUTRITIONAL SUPPLEMENTS Breakfast, Dinner; Ensure Clear    Tolerating current diet: YES  Passing flatus: YES  Last Bowel Movement: today   Appearance: formed, brown    Respiratory:    Incentive Spirometer at bedside: YES  Patient instructed on use: YES    Patient Safety:    Falls Score: 1  Bed Alarm On? No  Sitter?  No    Helio Tsang

## 2020-09-01 PROBLEM — R10.9 ABDOMINAL PAIN: Status: ACTIVE | Noted: 2020-09-01

## 2020-09-01 LAB
ALBUMIN SERPL-MCNC: 3.4 G/DL (ref 3.5–5)
ALBUMIN/GLOB SERPL: 0.9 {RATIO} (ref 1.1–2.2)
ALP SERPL-CCNC: 79 U/L (ref 45–117)
ALT SERPL-CCNC: 28 U/L (ref 12–78)
ANION GAP SERPL CALC-SCNC: 4 MMOL/L (ref 5–15)
AST SERPL-CCNC: 21 U/L (ref 15–37)
ATRIAL RATE: 78 BPM
BASOPHILS # BLD: 0.1 K/UL (ref 0–0.1)
BASOPHILS NFR BLD: 0 % (ref 0–1)
BILIRUB SERPL-MCNC: 0.4 MG/DL (ref 0.2–1)
BUN SERPL-MCNC: 6 MG/DL (ref 6–20)
BUN/CREAT SERPL: 7 (ref 12–20)
CALCIUM SERPL-MCNC: 8.9 MG/DL (ref 8.5–10.1)
CALCULATED P AXIS, ECG09: 78 DEGREES
CALCULATED R AXIS, ECG10: 69 DEGREES
CALCULATED T AXIS, ECG11: 73 DEGREES
CHLORIDE SERPL-SCNC: 104 MMOL/L (ref 97–108)
CO2 SERPL-SCNC: 28 MMOL/L (ref 21–32)
CREAT SERPL-MCNC: 0.86 MG/DL (ref 0.7–1.3)
DIAGNOSIS, 93000: NORMAL
DIFFERENTIAL METHOD BLD: ABNORMAL
EOSINOPHIL # BLD: 0.1 K/UL (ref 0–0.4)
EOSINOPHIL NFR BLD: 1 % (ref 0–7)
ERYTHROCYTE [DISTWIDTH] IN BLOOD BY AUTOMATED COUNT: 15.9 % (ref 11.5–14.5)
GLOBULIN SER CALC-MCNC: 3.8 G/DL (ref 2–4)
GLUCOSE SERPL-MCNC: 103 MG/DL (ref 65–100)
HCT VFR BLD AUTO: 46.8 % (ref 36.6–50.3)
HGB BLD-MCNC: 15.1 G/DL (ref 12.1–17)
IMM GRANULOCYTES # BLD AUTO: 0.1 K/UL (ref 0–0.04)
IMM GRANULOCYTES NFR BLD AUTO: 1 % (ref 0–0.5)
INR PPP: 1 (ref 0.9–1.1)
LIPASE SERPL-CCNC: 308 U/L (ref 73–393)
LYMPHOCYTES # BLD: 3.7 K/UL (ref 0.8–3.5)
LYMPHOCYTES NFR BLD: 20 % (ref 12–49)
MCH RBC QN AUTO: 27.5 PG (ref 26–34)
MCHC RBC AUTO-ENTMCNC: 32.3 G/DL (ref 30–36.5)
MCV RBC AUTO: 85.1 FL (ref 80–99)
MONOCYTES # BLD: 1 K/UL (ref 0–1)
MONOCYTES NFR BLD: 5 % (ref 5–13)
NEUTS SEG # BLD: 13.1 K/UL (ref 1.8–8)
NEUTS SEG NFR BLD: 73 % (ref 32–75)
NRBC # BLD: 0 K/UL (ref 0–0.01)
NRBC BLD-RTO: 0 PER 100 WBC
P-R INTERVAL, ECG05: 130 MS
PLATELET # BLD AUTO: 359 K/UL (ref 150–400)
PMV BLD AUTO: 9.7 FL (ref 8.9–12.9)
POTASSIUM SERPL-SCNC: 4 MMOL/L (ref 3.5–5.1)
PROT SERPL-MCNC: 7.2 G/DL (ref 6.4–8.2)
PROTHROMBIN TIME: 10.8 SEC (ref 9–11.1)
Q-T INTERVAL, ECG07: 370 MS
QRS DURATION, ECG06: 82 MS
QTC CALCULATION (BEZET), ECG08: 421 MS
RBC # BLD AUTO: 5.5 M/UL (ref 4.1–5.7)
SODIUM SERPL-SCNC: 136 MMOL/L (ref 136–145)
VENTRICULAR RATE, ECG03: 78 BPM
WBC # BLD AUTO: 18.1 K/UL (ref 4.1–11.1)

## 2020-09-01 PROCEDURE — 36415 COLL VENOUS BLD VENIPUNCTURE: CPT

## 2020-09-01 PROCEDURE — 74011250636 HC RX REV CODE- 250/636: Performed by: EMERGENCY MEDICINE

## 2020-09-01 PROCEDURE — 74011000250 HC RX REV CODE- 250: Performed by: HOSPITALIST

## 2020-09-01 PROCEDURE — C9113 INJ PANTOPRAZOLE SODIUM, VIA: HCPCS | Performed by: HOSPITALIST

## 2020-09-01 PROCEDURE — 74011250636 HC RX REV CODE- 250/636: Performed by: HOSPITALIST

## 2020-09-01 PROCEDURE — 85025 COMPLETE CBC W/AUTO DIFF WBC: CPT

## 2020-09-01 PROCEDURE — 74011000258 HC RX REV CODE- 258: Performed by: HOSPITALIST

## 2020-09-01 PROCEDURE — 83690 ASSAY OF LIPASE: CPT

## 2020-09-01 PROCEDURE — 80053 COMPREHEN METABOLIC PANEL: CPT

## 2020-09-01 PROCEDURE — 65270000029 HC RM PRIVATE

## 2020-09-01 PROCEDURE — 85610 PROTHROMBIN TIME: CPT

## 2020-09-01 RX ORDER — MORPHINE SULFATE 2 MG/ML
2 INJECTION, SOLUTION INTRAMUSCULAR; INTRAVENOUS ONCE
Status: COMPLETED | OUTPATIENT
Start: 2020-09-01 | End: 2020-09-01

## 2020-09-01 RX ORDER — PROMETHAZINE HYDROCHLORIDE 25 MG/1
12.5 TABLET ORAL
Status: DISCONTINUED | OUTPATIENT
Start: 2020-09-01 | End: 2020-09-08 | Stop reason: HOSPADM

## 2020-09-01 RX ORDER — SODIUM CHLORIDE 0.9 % (FLUSH) 0.9 %
5-40 SYRINGE (ML) INJECTION EVERY 8 HOURS
Status: DISCONTINUED | OUTPATIENT
Start: 2020-09-01 | End: 2020-09-08 | Stop reason: HOSPADM

## 2020-09-01 RX ORDER — ACETAMINOPHEN 325 MG/1
650 TABLET ORAL
Status: DISCONTINUED | OUTPATIENT
Start: 2020-09-01 | End: 2020-09-08 | Stop reason: HOSPADM

## 2020-09-01 RX ORDER — ACETAMINOPHEN 650 MG/1
650 SUPPOSITORY RECTAL
Status: DISCONTINUED | OUTPATIENT
Start: 2020-09-01 | End: 2020-09-08 | Stop reason: HOSPADM

## 2020-09-01 RX ORDER — POLYETHYLENE GLYCOL 3350 17 G/17G
17 POWDER, FOR SOLUTION ORAL DAILY PRN
Status: DISCONTINUED | OUTPATIENT
Start: 2020-09-01 | End: 2020-09-08 | Stop reason: HOSPADM

## 2020-09-01 RX ORDER — ONDANSETRON 2 MG/ML
4 INJECTION INTRAMUSCULAR; INTRAVENOUS
Status: DISCONTINUED | OUTPATIENT
Start: 2020-09-01 | End: 2020-09-08 | Stop reason: HOSPADM

## 2020-09-01 RX ORDER — SODIUM CHLORIDE 9 MG/ML
125 INJECTION, SOLUTION INTRAVENOUS CONTINUOUS
Status: DISCONTINUED | OUTPATIENT
Start: 2020-09-01 | End: 2020-09-08 | Stop reason: HOSPADM

## 2020-09-01 RX ORDER — MORPHINE SULFATE 2 MG/ML
4 INJECTION, SOLUTION INTRAMUSCULAR; INTRAVENOUS
Status: DISCONTINUED | OUTPATIENT
Start: 2020-09-01 | End: 2020-09-05

## 2020-09-01 RX ORDER — SODIUM CHLORIDE 0.9 % (FLUSH) 0.9 %
5-40 SYRINGE (ML) INJECTION AS NEEDED
Status: DISCONTINUED | OUTPATIENT
Start: 2020-09-01 | End: 2020-09-08 | Stop reason: HOSPADM

## 2020-09-01 RX ADMIN — MORPHINE SULFATE 2 MG: 2 INJECTION, SOLUTION INTRAMUSCULAR; INTRAVENOUS at 06:16

## 2020-09-01 RX ADMIN — PIPERACILLIN AND TAZOBACTAM 3.38 G: 3; .375 INJECTION, POWDER, LYOPHILIZED, FOR SOLUTION INTRAVENOUS at 14:05

## 2020-09-01 RX ADMIN — MORPHINE SULFATE 4 MG: 2 INJECTION, SOLUTION INTRAMUSCULAR; INTRAVENOUS at 08:03

## 2020-09-01 RX ADMIN — MORPHINE SULFATE 4 MG: 2 INJECTION, SOLUTION INTRAMUSCULAR; INTRAVENOUS at 20:40

## 2020-09-01 RX ADMIN — SODIUM CHLORIDE 100 ML/HR: 900 INJECTION, SOLUTION INTRAVENOUS at 22:04

## 2020-09-01 RX ADMIN — Medication 10 ML: at 08:05

## 2020-09-01 RX ADMIN — MORPHINE SULFATE 4 MG: 2 INJECTION, SOLUTION INTRAMUSCULAR; INTRAVENOUS at 11:03

## 2020-09-01 RX ADMIN — MORPHINE SULFATE 4 MG: 2 INJECTION, SOLUTION INTRAMUSCULAR; INTRAVENOUS at 23:43

## 2020-09-01 RX ADMIN — SODIUM CHLORIDE 100 ML/HR: 900 INJECTION, SOLUTION INTRAVENOUS at 00:35

## 2020-09-01 RX ADMIN — SODIUM CHLORIDE 40 MG: 9 INJECTION, SOLUTION INTRAMUSCULAR; INTRAVENOUS; SUBCUTANEOUS at 00:35

## 2020-09-01 RX ADMIN — Medication 10 ML: at 14:05

## 2020-09-01 RX ADMIN — MORPHINE SULFATE 4 MG: 4 INJECTION, SOLUTION INTRAMUSCULAR; INTRAVENOUS at 03:42

## 2020-09-01 RX ADMIN — SODIUM CHLORIDE 100 ML/HR: 900 INJECTION, SOLUTION INTRAVENOUS at 11:37

## 2020-09-01 RX ADMIN — ONDANSETRON 4 MG: 2 INJECTION INTRAMUSCULAR; INTRAVENOUS at 08:02

## 2020-09-01 RX ADMIN — SODIUM CHLORIDE 40 MG: 9 INJECTION, SOLUTION INTRAMUSCULAR; INTRAVENOUS; SUBCUTANEOUS at 20:40

## 2020-09-01 RX ADMIN — SODIUM CHLORIDE 40 MG: 9 INJECTION, SOLUTION INTRAMUSCULAR; INTRAVENOUS; SUBCUTANEOUS at 10:32

## 2020-09-01 RX ADMIN — MORPHINE SULFATE 4 MG: 2 INJECTION, SOLUTION INTRAMUSCULAR; INTRAVENOUS at 14:05

## 2020-09-01 RX ADMIN — PIPERACILLIN AND TAZOBACTAM 3.38 G: 3; .375 INJECTION, POWDER, LYOPHILIZED, FOR SOLUTION INTRAVENOUS at 21:35

## 2020-09-01 RX ADMIN — PIPERACILLIN AND TAZOBACTAM 3.38 G: 3; .375 INJECTION, POWDER, LYOPHILIZED, FOR SOLUTION INTRAVENOUS at 05:56

## 2020-09-01 RX ADMIN — Medication 10 ML: at 21:37

## 2020-09-01 RX ADMIN — MORPHINE SULFATE 4 MG: 2 INJECTION, SOLUTION INTRAMUSCULAR; INTRAVENOUS at 17:10

## 2020-09-01 RX ADMIN — Medication 10 ML: at 03:44

## 2020-09-01 RX ADMIN — ONDANSETRON 4 MG: 2 INJECTION INTRAMUSCULAR; INTRAVENOUS at 14:05

## 2020-09-01 NOTE — PROGRESS NOTES
General Surgery End of Shift Nursing Note    Bedside shift change report given to Jefferson Vila RN (oncoming nurse) by Anshu Ely RN (offgoing nurse). Report included the following information SBAR, Kardex, Intake/Output, MAR and Recent Results. Shift worked:   7a-7p   Summary of shift:    I assumed care of this patient at 0. Pt complained of abdominal pain, medicated per MAR. Resting comfortably in bed. Pt did not get OOB during this shift. Issues for physician to address:   Plan      Number times ambulated in hallway past shift: 0    Number of times OOB to chair past shift: 0    Pain Management:  Current medication: Morphine  Patient states pain is manageable on current pain medication: YES    GI:    Current diet:  DIET CLEAR LIQUID  DIET NPO    Tolerating current diet: YES  Passing flatus: YES  Last Bowel Movement: today   Appearance: watery    Respiratory:    Incentive Spirometer at bedside: YES  Patient instructed on use: YES    Patient Safety:    Falls Score: 1  Bed Alarm On? No  Sitter?  No    Percy Rodriguez RN

## 2020-09-01 NOTE — PROGRESS NOTES
Chart reviewed , pt seen   Admitted earlier by my colleague   C/w current management   Non billable round

## 2020-09-01 NOTE — ED NOTES
TRANSFER - OUT REPORT:    Verbal report given to Steff Madera (name) on Raul Murry  being transferred to Gen Surg (unit) for routine progression of care       Report consisted of patients Situation, Background, Assessment and   Recommendations(SBAR). Information from the following report(s) SBAR, ED Summary, [facility] and Cardiac Rhythm NSR was reviewed with the receiving nurse. Lines:   Peripheral IV 08/31/20 Left Forearm (Active)   Site Assessment Clean, dry, & intact 08/31/20 2117   Phlebitis Assessment 0 08/31/20 2117   Infiltration Assessment 0 08/31/20 2117   Dressing Status Clean, dry, & intact 08/31/20 2117   Dressing Type Transparent 08/31/20 2117   Hub Color/Line Status Pink 08/31/20 2117   Action Taken Blood drawn 08/31/20 2117        Opportunity for questions and clarification was provided.       Patient transported with:   [employer]

## 2020-09-01 NOTE — H&P
Hospitalist Admission Note    NAME: Yvonne Haro   :  1960   MRN:  [id number]     Date/Time:  2020 11:40 PM    Patient PCP: Leopoldo Garces MD  _____________________________________________________________________  Given the patient's current clinical presentation, I have a high level of concern for decompensation if discharged from the emergency department. Complex decision making was performed, which includes reviewing the patient's available past medical records, laboratory results, and x-ray films. My assessment of this patient's clinical condition and my plan of care is as follows. Assessment / Plan:  Abdominal pain-POA Per last GI Note Pancreatic pseudocyst, likely cause of duodenal inflammation and fluid collection   CT ABD PELV W CONT    1. Persistent fluid collection adjacent to the second portion of the duodenum  which is slightly increased in size although not significantly. 2.  Persistent adjacent wall thickening of the duodenum. 3.  Other incidental findings as described below  Elevated lipase level 512. Patient was seen by GI Dr Massiel Pierson     EGD ON 2020  Findings:   Esophagus:normal   Stomach: normal   Duodenum: Area of inflamed mucosa medial wall, second portion of   duodenum about 3 by 5 cm. Some of mucosa heaped up, but all soft. Multiple biopsies obtained. Never saw Liliana Pederson. No visible ulcer   Therapies:  biopsy of duodenal second portion   Recommendations:   -Await pathology. , -Next step most likely will be EUS of pancrease.    -We will keep n.p.o.  -IV PPI  -IV analgesia  -IV fluids  -ED MD spoke with on-call surgeon who recommended GI consultation for possible ERCP and EUS and no acute surgical intervention needed  -We will closely monitor CBC CMP           History of ETOH abuse   Tobacco abuse   · Denies ETOH since 2019  · Current every day smoker   · Patient educated on the importance of smoking cessation and the health benefits associated with quitting. · Nicotine patch ordered       Code Status: Full  Surrogate Decision Maker:  Kia Dodsno  [phone]          DVT Prophylaxis: Hold anticoagulation for possible procedure in a.m. GI Prophylaxis: not indicated    Baseline: Independent        Subjective:   CHIEF COMPLAINT: Abdominal pain    HISTORY OF PRESENT ILLNESS:     Pilar Haney is a 64 y.o.  male with pmh as below  presents abdominal pain mostly in the right lower quadrant and right flank associated with nausea and intermittent nonbloody bilious nonbloody vomiting for the last few week via EMS. Of note he was in hosp. In early Aug 2020 with evidence duodenal perforation and medial abscess, rx with aspiration alone and antibx, and FU CT with INCREASING DUODENAL fluid collection. Patient had EGD on 828 by GI. See the report below. Currently patient is on p.o. ciprofloxacin and Flagyl at home. Seen by Dr. Ede Emanuel , surgeon on 8/28/2018 outpatient and impression was that he may need GI  ? EGD with US drain or elective surgical exploration but need pancreatic biliary surgeon. GI Dr Cameron montanez saw pt in hospital    .       We were asked to admit for work up and evaluation of the above problems. FINDINGS:   LOWER THORAX: No significant abnormality in the incidentally imaged lower chest.  LIVER: No mass. BILIARY TREE: Gallbladder is within normal limits. CBD is not dilated. SPLEEN: within normal limits. PANCREAS: Improved pancreatic ductal dilatation. ADRENALS: Unremarkable. KIDNEYS: No mass, calculus, or hydronephrosis. STOMACH: Unremarkable. SMALL BOWEL: Focal collection adjacent to the second portion of duodenum is  again noted now measuring 3.7 x 1.9 cm. This is slightly increased in size  although not significantly. There is persistent thickening of the duodenum this  region.   COLON: Fluid-filled colon  APPENDIX: Unremarkable  PERITONEUM: No ascites or pneumoperitoneum. RETROPERITONEUM: Diffuse atherosclerotic disease in the abdominal aorta and  branch vessels. No evidence of aneurysm  REPRODUCTIVE ORGANS: Enlarged prostate  URINARY BLADDER: Distended urinary bladder  BONES: No destructive bone lesion. ABDOMINAL WALL: No mass or hernia. ADDITIONAL COMMENTS: N/A       IMPRESSION:  1. Persistent fluid collection adjacent to the second portion of the duodenum  which is slightly increased in size although not significantly. 2.  Persistent adjacent wall thickening of the duodenum. 3.  Other incidental findings as described    EGD ON 8/28/2020  Findings:   Esophagus:normal   Stomach: normal   Duodenum: Area of inflamed mucosa medial wall, second portion of   duodenum about 3 by 5 cm. Some of mucosa heaped up, but all soft. Multiple biopsies obtained. Never saw Paticia Raring. No visible ulcer     Therapies:  biopsy of duodenal second portion     Specimens: duodenal biopsies            Complications:   None; patient tolerated the procedure well. EBL:  None. Impression:      -Inflamed duodenal mucosa as above, no ulcer see     Recommendations:   -Await pathology. , -Next step most likely will be EUS of   pancrease. Will discuss with Dr. Tad Guerrero. Can start clear liquids     Miguel Burns MD8/28/2020                 Past Medical History:   Diagnosis Date    PUD (peptic ulcer disease)         Past Surgical History:   Procedure Laterality Date    HX CHOLECYSTECTOMY      UPPER GI ENDOSCOPY,BIOPSY  8/19/2020         UPPER GI ENDOSCOPY,BIOPSY  8/28/2020            Social History     Tobacco Use    Smoking status: Current Every Day Smoker    Smokeless tobacco: Never Used   Substance Use Topics    Alcohol use: Yes     Comment: socially        No family history on file. No Known Allergies     Prior to Admission medications    Medication Sig Start Date End Date Taking?  Authorizing Provider   nicotine (NICODERM CQ) 21 mg/24 hr 1 Patch by TransDERmal route daily for 30 days. 9/1/20 10/1/20  Lola Beavers NP   metroNIDAZOLE (FlagyL) 500 mg tablet Take 1 Tab by mouth three (3) times daily for 5 days. 8/31/20 9/5/20  Lola Beavers NP   ciprofloxacin HCl (Cipro) 500 mg tablet Take 1 Tab by mouth two (2) times a day for 5 days. 8/31/20 9/5/20  Lola Beavers NP   ondansetron hcl (Zofran) 4 mg tablet Take 1 Tab by mouth every eight (8) hours as needed for Nausea or Vomiting. 8/31/20   Lola Beavers NP   oxyCODONE-acetaminophen (Percocet) 5-325 mg per tablet Take 1 Tab by mouth every six (6) hours as needed for Pain for up to 3 days. Max Daily Amount: 4 Tabs. 8/31/20 9/3/20  Lola Beavers NP   pantoprazole (PROTONIX) 20 mg tablet Take 1 Tab by mouth daily. 8/19/20   Marcie Robles MD       REVIEW OF SYSTEMS:     I am not able to complete the review of systems because:    The patient is intubated and sedated    The patient has altered mental status due to his acute medical problems    The patient has baseline aphasia from prior stroke(s)    The patient has baseline dementia and is not reliable historian    The patient is in acute medical distress and unable to provide information           Total of 12 systems reviewed as follows:       POSITIVE= underlined text  Negative = text not underlined  General:  fever, chills, sweats, generalized weakness, weight loss/gain,      loss of appetite   Eyes:    blurred vision, eye pain, loss of vision, double vision  ENT:    rhinorrhea, pharyngitis   Respiratory:   cough, sputum production, SOB, GARZA, wheezing, pleuritic pain   Cardiology:   chest pain, palpitations, orthopnea, PND, edema, syncope   Gastrointestinal:  abdominal pain , N/V, diarrhea, dysphagia, constipation, bleeding   Genitourinary:  frequency, urgency, dysuria, hematuria, incontinence   Muskuloskeletal :  arthralgia, myalgia, back pain  Hematology:  easy bruising, nose or gum bleeding, lymphadenopathy Dermatological: rash, ulceration, pruritis, color change / jaundice  Endocrine:   hot flashes or polydipsia   Neurological:  headache, dizziness, confusion, focal weakness, paresthesia,     Speech difficulties, memory loss, gait difficulty  Psychological: Feelings of anxiety, depression, agitation    Objective:   VITALS:    Visit Vitals  /83   Pulse 82   Temp 98 °F (36.7 °C)   Resp 18   Ht 5' 11\" (1.803 m)   Wt 61.6 kg (135 lb 12.9 oz)   SpO2 98%   BMI 18.94 kg/m²       PHYSICAL EXAM:    General:    Alert, cooperative, no distress, appears stated age. HEENT: Atraumatic, anicteric sclerae, pink conjunctivae     No oral ulcers, mucosa moist, throat clear, dentition fair  Neck:  Supple, symmetrical,  thyroid: non tender  Lungs:   Clear to auscultation bilaterally. No Wheezing or Rhonchi. No rales. Chest wall:  No tenderness  No Accessory muscle use. Heart:   Regular  rhythm,  No  murmur   No edema  Abdomen:   Soft, non-tender. Not distended. Bowel sounds normal  Extremities: No cyanosis. No clubbing,      Skin turgor normal, Capillary refill normal, Radial dial pulse 2+  Skin:     Not pale. Not Jaundiced  No rashes   Psych:  Good insight. Not depressed. Not anxious or agitated. Neurologic: EOMs intact. No facial asymmetry. No aphasia or slurred speech. Symmetrical strength, Sensation grossly intact.  Alert and oriented X 4.     _______________________________________________________________________  Care Plan discussed with:    Comments   Patient y    Family      RN y    Care Manager                    Consultant:  kermit Hopper md   _______________________________________________________________________  Expected  Disposition:   Home with Family    HH/PT/OT/RN    SNF/LTC    TE    ________________________________________________________________________  TOTAL TIME: 61   Minutes    Critical Care Provided     Minutes non procedure based      Comments    y Reviewed previous records   >50% of visit spent in counseling and coordination of care y Discussion with patient and/or family and questions answered       Given the patient's current clinical presentation, I have a high level of concern for decompensation if discharged from the ED. Complex decision making was performed which includes reviewing the patient's available past medical records, laboratory results, and Xray films. I have also directly communicated my plan and discussed this case with the involved ED physician.     ____________________________________________________________________  Kellie Bonner MD    Procedures: see electronic medical records for all procedures/Xrays and details which were not copied into this note but were reviewed prior to creation of Plan.     LAB DATA REVIEWED:    Recent Results (from the past 24 hour(s))   CBC W/O DIFF    Collection Time: 08/31/20  3:29 AM   Result Value Ref Range    WBC 10.0 4.1 - 11.1 K/uL    RBC 5.43 4.10 - 5.70 M/uL    HGB 14.8 12.1 - 17.0 g/dL    HCT 47.4 36.6 - 50.3 %    MCV 87.3 80.0 - 99.0 FL    MCH 27.3 26.0 - 34.0 PG    MCHC 31.2 30.0 - 36.5 g/dL    RDW 15.9 (H) 11.5 - 14.5 %    PLATELET 163 321 - 969 K/uL    MPV 9.8 8.9 - 12.9 FL    NRBC 0.0 0  WBC    ABSOLUTE NRBC 0.00 0.00 - 1.40 K/uL   METABOLIC PANEL, BASIC    Collection Time: 08/31/20  3:29 AM   Result Value Ref Range    Sodium 138 136 - 145 mmol/L    Potassium 3.6 3.5 - 5.1 mmol/L    Chloride 102 97 - 108 mmol/L    CO2 32 21 - 32 mmol/L    Anion gap 4 (L) 5 - 15 mmol/L    Glucose 91 65 - 100 mg/dL    BUN 9 6 - 20 MG/DL    Creatinine 1.02 0.70 - 1.30 MG/DL    BUN/Creatinine ratio 9 (L) 12 - 20      GFR est AA >60 >60 ml/min/1.73m2    GFR est non-AA >60 >60 ml/min/1.73m2    Calcium 9.2 8.5 - 10.1 MG/DL   LIPASE    Collection Time: 08/31/20  3:29 AM   Result Value Ref Range    Lipase 399 (H) 73 - 393 U/L   EKG, 12 LEAD, INITIAL    Collection Time: 08/31/20  9:08 PM   Result Value Ref Range    Ventricular Rate 78 BPM    Atrial Rate 78 BPM    P-R Interval 130 ms    QRS Duration 82 ms    Q-T Interval 370 ms    QTC Calculation (Bezet) 421 ms    Calculated P Axis 78 degrees    Calculated R Axis 69 degrees    Calculated T Axis 73 degrees    Diagnosis       Normal sinus rhythm  Normal ECG  When compared with ECG of 13-AUG-2020 03:52,  No significant change was found     POC LACTIC ACID    Collection Time: 08/31/20  9:10 PM   Result Value Ref Range    Lactic Acid (POC) 1.22 0.40 - 2.00 mmol/L   CBC WITH AUTOMATED DIFF    Collection Time: 08/31/20  9:12 PM   Result Value Ref Range    WBC 18.6 (H) 4.1 - 11.1 K/uL    RBC 5.84 (H) 4.10 - 5.70 M/uL    HGB 16.1 12.1 - 17.0 g/dL    HCT 49.1 36.6 - 50.3 %    MCV 84.1 80.0 - 99.0 FL    MCH 27.6 26.0 - 34.0 PG    MCHC 32.8 30.0 - 36.5 g/dL    RDW 15.9 (H) 11.5 - 14.5 %    PLATELET 129 938 - 323 K/uL    MPV 9.6 8.9 - 12.9 FL    NRBC 0.0 0  WBC    ABSOLUTE NRBC 0.00 0.00 - 0.01 K/uL    NEUTROPHILS 74 32 - 75 %    LYMPHOCYTES 19 12 - 49 %    MONOCYTES 5 5 - 13 %    EOSINOPHILS 1 0 - 7 %    BASOPHILS 0 0 - 1 %    IMMATURE GRANULOCYTES 1 (H) 0.0 - 0.5 %    ABS. NEUTROPHILS 14.0 (H) 1.8 - 8.0 K/UL    ABS. LYMPHOCYTES 3.5 0.8 - 3.5 K/UL    ABS. MONOCYTES 0.9 0.0 - 1.0 K/UL    ABS. EOSINOPHILS 0.1 0.0 - 0.4 K/UL    ABS. BASOPHILS 0.1 0.0 - 0.1 K/UL    ABS. IMM. GRANS. 0.1 (H) 0.00 - 0.04 K/UL    DF AUTOMATED     METABOLIC PANEL, COMPREHENSIVE    Collection Time: 08/31/20  9:12 PM   Result Value Ref Range    Sodium 137 136 - 145 mmol/L    Potassium 3.8 3.5 - 5.1 mmol/L    Chloride 101 97 - 108 mmol/L    CO2 31 21 - 32 mmol/L    Anion gap 5 5 - 15 mmol/L    Glucose 102 (H) 65 - 100 mg/dL    BUN 8 6 - 20 MG/DL    Creatinine 1.08 0.70 - 1.30 MG/DL    BUN/Creatinine ratio 7 (L) 12 - 20      GFR est AA >60 >60 ml/min/1.73m2    GFR est non-AA >60 >60 ml/min/1.73m2    Calcium 9.7 8.5 - 10.1 MG/DL    Bilirubin, total 0.4 0.2 - 1.0 MG/DL    ALT (SGPT) 34 12 - 78 U/L    AST (SGOT) 22 15 - 37 U/L    Alk.  phosphatase 92 45 - 117 U/L Protein, total 8.3 (H) 6.4 - 8.2 g/dL    Albumin 3.7 3.5 - 5.0 g/dL    Globulin 4.6 (H) 2.0 - 4.0 g/dL    A-G Ratio 0.8 (L) 1.1 - 2.2     LIPASE    Collection Time: 08/31/20  9:12 PM   Result Value Ref Range    Lipase 512 (H) 73 - 393 U/L   ETHYL ALCOHOL    Collection Time: 08/31/20  9:12 PM   Result Value Ref Range    ALCOHOL(ETHYL),SERUM <10 <10 MG/DL

## 2020-09-01 NOTE — CONSULTS
Patient well know to me. Has persistent fluid collection in the wall/grove of the second portion of the duodenum. Initial diagnosis was abscess from perforated ulcer. Aspiration was negative for infection. EGD showed no ulcer in the duodenum. There was some unusual mucosa. Biopsy was inflammation and single granuloma. Plan was likely EUS. This was not done and patient discharged. Patient returned same day with persistent pain. As Dr Alexandru Barton outlined, there is no surgical option at this time. Recommend medical management. Would not recommend a second percutaneous aspiration. The first was sterile and repeated attempts only increase the risk of contaminating the collection. Resection would require a pancreaticoduodenectomy which is not recommended as a acute treatment and usually only done for know malignancy. If this is a pseudocyst, then cystojejunostomy could be done once it matures, usually 6 weeks after process started. Finally, it could be fenestrated into the duodenum but this is high risk of injury to CBD and pancreatic duct especially without pre-op EUS. I recommend treating like pancreatitis with bowel rest and IVF. Wound re-engage GI for possible EUS.   Will see if patient decompensated

## 2020-09-01 NOTE — PROGRESS NOTES
TRANSFER - IN REPORT:    Verbal report received from Ratna Toledo [facility] (name) on Reza Escobedo  being received from ED (unit) for routine progression of care      Report consisted of patients Situation, Background, Assessment and   Recommendations(SBAR). Information from the following report(s) SBAR, Kardex, Intake/Output, MAR and Recent Results was reviewed with the receiving nurse. Opportunity for questions and clarification was provided. Assessment completed upon patients arrival to unit and care assumed.

## 2020-09-01 NOTE — CONSULTS
GI Consultation Note (for Kermit Singh)    NAME: Quincy Kwon : 1960 MRN: [id number]   ATTG: Edgar Smith MD PCP: Laney Corcoran MD  Date/Time:  2020 11:57 AM  Subjective:   REASON FOR CONSULT:      Melissa Mesa is a 64 y.o. [place] male with hx of EtOH abuse and cholecystectomy 3/2020, who I was asked to see for possible duodenal abscess. He is known to our service from recent admission 20 and 20 having been only discharged from the hospital 20, returning now c/o recurrent severe pain in the RUQ, radiating into his back, worsening with PO and associated with nausea, but no emesis. He denies fever, chills, jaundice, acholic stool, CP, or SOB. He describes having diarrhea yesterday, but not this AM.  He notes that available narcotic analgesic does not last long enough. He reports compliance with the prescribed Flagyl and Cipro. Laboratory studies on admission here today showed WBC of 18.6 (was nl on d/c yesterday), hemoglobin is normal at 16.1, lipase is elevated at 512 (now 308). Liver function tests are normal.  CT scan notes increased size of FC on medial wall of duodenum now 3.7cm x 1.9cm with septations with persistent duodenal wall thickening, but no pancreatic abnormality. When admitted 20 with abdominal pain, his CT scan which showed a fluid collection adjacent to the duodenum. This was initially thought to possibly represent a perforated ulcer with contained perforation. It was actually drained percutaneously by the IR, and described as bloody fluid felt c/w hematoma with Cx noted to be negative. The fluid was not sent for other laboratory studies such as CEA or amylase. GI consultation was obtained then to comment on CT findings of duodenal wall thickening and enhancement of the second portion of the duodenum with the adjacent 4 cm peripherally enhancing fluid collection, and question of an ampullary mass.   The patient was known to our practice from prior evaluation in 11/2019 at [place] [address].  He had a history of alcohol abuse. During his hospitalization from 11/26/2019 to 12/06/2019 at [place] [address], he underwent upper endoscopy, which showed a medium hiatal hernia and localized changes of congestion. An endoscopic ultrasound at that time showed mucosal changes of the duodenum, but no sign of significant pathology in the common bile duct. There was hyperechoic material consistent with sludge in the gallbladder body. There was pancreatic parenchymal abnormalities consisting of hyperechoic strands and globularity in the genu of the pancreas, pancreatic body, and pancreatic tail. Pancreatic parenchymal abnormalities were also noted in the pancreatic head. Fine needle aspiration was performed. The biopsy was negative for malignant cells, but there were numerous benign acinar cells present in the aspiration, and cell blot was shown to have possible neuroendocrine cell population. CA-19-9 level was then 33, IgG4 level was elevated, lipase was elevated at that time. The patient also had undergone colonoscopy by Dr. Delores Shea on 03/10/2020, which was unremarkable. The patient reports he underwent cholecystectomy this 3/2020 at [place] [address].  During his admission in mid August at [facility], an MRCP done 08/18 showed. e liver to have heterogeneous reticular enhancement in the arterial phase reactive to duodenal inflammation. There was prior cholecystectomy, common bile duct proximally was 8 mm, distally was 6 mm. There was no ampullary mass. The pancreas and pancreatic duct were within normal limits. In the bowel, there was a lobulated, septated, hyperintense T2 signal in the medial wall of the second portion of duodenum measuring 2.3 x 1.1 x 2.5 cm, which was felt to be slightly increased from the CT scan obtained the day before. There was mural thickening of the second portion of the duodenum and edema in the duodenum.   We saw the patient in consultation and suggested upper endoscopy, but he was feeling better and wanted to leave with outpatient followup. He was readmitted 8/27/20 with increasing abdominal pain with CT scan done 2d PTA notined to show increasing size of the fluid collection adjacent to the medial aspect of the proximal duodenum, increased in size to 31 x 17 mm from 21 x 11 mm. EGD with forward viewing scope only confirmed dx of duodenitis on bx, but no clear ulcer seen. We had suggested further evaluation with ultrasound could be considered, but given that pt  requested d/c home on 8/31/20, would make arrangements for this as OP. This would be done to consider sampling fluid to establish dx as pseudocyst and exclusion of mass lesion. Surgical consultation obtained with Merced Ortega and Joseph Black during the course of his prior and current hospitalization suggest that there is no surgical option at this time with recommendation for medical management. And avoidance of second percutaneous aspiration given risk for contaminating the fluid collection. They note that resection would require a pancreaticoduodenectomy which is not recommended as a acute treatment and usually only done for know malignancy. If this is confirmed as a pseudocyst, then cystojejunostomy could be done once it matures, usually 6 weeks after process started. Finally, consideration for fenestration into the duodenum has high risk of injury to CBD and pancreatic duct especially without pre-op EUS. Past Medical History:   Diagnosis Date    PUD (peptic ulcer disease)       Past Surgical History:   Procedure Laterality Date    HX CHOLECYSTECTOMY      UPPER GI ENDOSCOPY,BIOPSY  8/19/2020         UPPER GI ENDOSCOPY,BIOPSY  8/28/2020          Social History     Tobacco Use    Smoking status: Current Every Day Smoker    Smokeless tobacco: Never Used   Substance Use Topics    Alcohol use: Yes     Comment: socially      No family history on file. No Known Allergies   Home Medications:  Prior to Admission Medications   Prescriptions Last Dose Informant Patient Reported? Taking?   ciprofloxacin HCl (Cipro) 500 mg tablet 8/31/2020 at Unknown time Self No Yes   Sig: Take 1 Tab by mouth two (2) times a day for 5 days. metroNIDAZOLE (FlagyL) 500 mg tablet 8/31/2020 at Unknown time Self No Yes   Sig: Take 1 Tab by mouth three (3) times daily for 5 days. ondansetron hcl (Zofran) 4 mg tablet  Self No Yes   Sig: Take 1 Tab by mouth every eight (8) hours as needed for Nausea or Vomiting. oxyCODONE-acetaminophen (Percocet) 5-325 mg per tablet  Self No Yes   Sig: Take 1 Tab by mouth every six (6) hours as needed for Pain for up to 3 days. Max Daily Amount: 4 Tabs. Facility-Administered Medications: None     Hospital medications:  Current Facility-Administered Medications   Medication Dose Route Frequency    pantoprazole (PROTONIX) 40 mg in 0.9% sodium chloride 10 mL injection  40 mg IntraVENous Q12H    sodium chloride (NS) flush 5-40 mL  5-40 mL IntraVENous Q8H    sodium chloride (NS) flush 5-40 mL  5-40 mL IntraVENous PRN    acetaminophen (TYLENOL) tablet 650 mg  650 mg Oral Q6H PRN    Or    acetaminophen (TYLENOL) suppository 650 mg  650 mg Rectal Q6H PRN    polyethylene glycol (MIRALAX) packet 17 g  17 g Oral DAILY PRN    promethazine (PHENERGAN) tablet 12.5 mg  12.5 mg Oral Q6H PRN    Or    ondansetron (ZOFRAN) injection 4 mg  4 mg IntraVENous Q6H PRN    morphine injection 4 mg  4 mg IntraVENous Q3H PRN    0.9% sodium chloride infusion  100 mL/hr IntraVENous CONTINUOUS    piperacillin-tazobactam (ZOSYN) 3.375 g in 0.9% sodium chloride (MBP/ADV) 100 mL  3.375 g IntraVENous Q8H    ondansetron (ZOFRAN) injection 4 mg  4 mg IntraVENous Q4H PRN     Current Outpatient Medications   Medication Sig    metroNIDAZOLE (FlagyL) 500 mg tablet Take 1 Tab by mouth three (3) times daily for 5 days.     ciprofloxacin HCl (Cipro) 500 mg tablet Take 1 Tab by mouth two (2) times a day for 5 days.  ondansetron hcl (Zofran) 4 mg tablet Take 1 Tab by mouth every eight (8) hours as needed for Nausea or Vomiting.  oxyCODONE-acetaminophen (Percocet) 5-325 mg per tablet Take 1 Tab by mouth every six (6) hours as needed for Pain for up to 3 days. Max Daily Amount: 4 Tabs. REVIEW OF SYSTEMS:     []     Unable to obtain  ROS due to  []    mental status change  []    sedated   []    intubated   []    Total of 11 systems reviewed as follows:  Const:  negative fever, negative chills, negative weight loss  Eyes:   negative diplopia or visual changes, negative eye pain  ENT:   negative coryza, negative sore throat  Resp:   negative cough, hemoptysis, dyspnea  Cards:  negative for chest pain, palpitations, lower extremity edema  :  negative for frequency, dysuria and hematuria  Skin:   negative for rash and pruritus  Heme:  negative for easy bruising and gum/nose bleeding  MS:  negative for myalgias, arthralgias, back pain and muscle weakness  Neurolo:  negative for headaches, dizziness, vertigo, memory problems   Psych:  negative for feelings of anxiety, depression     Pertinent Positives include :    Objective:   VITALS:    Visit Vitals  /52   Pulse 62   Temp 98.1 °F (36.7 °C)   Resp 12   Ht 5' 11\" (1.803 m)   Wt 61.6 kg (135 lb 12.9 oz)   SpO2 98%   BMI 18.94 kg/m²     Temp (24hrs), Av.4 °F (36.9 °C), Min:98 °F (36.7 °C), Max:99.1 °F (37.3 °C)    PHYSICAL EXAM:   General:    Alert, cooperative, no distress, appears stated age. Head:   Normocephalic, without obvious abnormality, atraumatic. Eyes:   Conjunctivae clear, anicteric sclerae. Pupils are equal  Nose:  Nares normal. No drainage or sinus tenderness. Throat:    Lips, mucosa, and tongue normal.  No Thrush  Neck:  Supple, symmetrical,  no adenopathy, thyroid: non tender  Back:    Symmetric,  No CVA tenderness. Lungs:   CTA bilaterally. No wheezing/rhonchi/rales.   Chest wall:  No tenderness or deformity. No Accessory muscle use. Heart:   Regular rate and rhythm,  no murmur, rub or gallop. Abdomen:   S, T in RUQ and epigastrum, ND.  NABS,  No G/G. Extremities: Atraumatic, No cyanosis. No edema. No clubbing  Skin:     Texture, turgor normal. No rashes/lesions/jaundice  Lymph: Cervical, supraclavicular normal.  Psych:  Good insight. Not depressed. Not anxious or agitated. Neurologic: EOMs intact. No facial asymmetry/aphasia/slurred speech. Normal strength, A/O X 3.      LAB DATA REVIEWED:    Recent Results (from the past 48 hour(s))   CBC W/O DIFF    Collection Time: 08/31/20  3:29 AM   Result Value Ref Range    WBC 10.0 4.1 - 11.1 K/uL    RBC 5.43 4.10 - 5.70 M/uL    HGB 14.8 12.1 - 17.0 g/dL    HCT 47.4 36.6 - 50.3 %    MCV 87.3 80.0 - 99.0 FL    MCH 27.3 26.0 - 34.0 PG    MCHC 31.2 30.0 - 36.5 g/dL    RDW 15.9 (H) 11.5 - 14.5 %    PLATELET 418 286 - 506 K/uL    MPV 9.8 8.9 - 12.9 FL    NRBC 0.0 0  WBC    ABSOLUTE NRBC 0.00 0.00 - 1.04 K/uL   METABOLIC PANEL, BASIC    Collection Time: 08/31/20  3:29 AM   Result Value Ref Range    Sodium 138 136 - 145 mmol/L    Potassium 3.6 3.5 - 5.1 mmol/L    Chloride 102 97 - 108 mmol/L    CO2 32 21 - 32 mmol/L    Anion gap 4 (L) 5 - 15 mmol/L    Glucose 91 65 - 100 mg/dL    BUN 9 6 - 20 MG/DL    Creatinine 1.02 0.70 - 1.30 MG/DL    BUN/Creatinine ratio 9 (L) 12 - 20      GFR est AA >60 >60 ml/min/1.73m2    GFR est non-AA >60 >60 ml/min/1.73m2    Calcium 9.2 8.5 - 10.1 MG/DL   LIPASE    Collection Time: 08/31/20  3:29 AM   Result Value Ref Range    Lipase 399 (H) 73 - 393 U/L   EKG, 12 LEAD, INITIAL    Collection Time: 08/31/20  9:08 PM   Result Value Ref Range    Ventricular Rate 78 BPM    Atrial Rate 78 BPM    P-R Interval 130 ms    QRS Duration 82 ms    Q-T Interval 370 ms    QTC Calculation (Bezet) 421 ms    Calculated P Axis 78 degrees    Calculated R Axis 69 degrees    Calculated T Axis 73 degrees    Diagnosis       Normal sinus rhythm  Normal ECG  When compared with ECG of 13-AUG-2020 03:52,  No significant change was found  Confirmed by Germán Guevara M.D. ([id number]) on 9/1/2020 11:46:58 AM     POC LACTIC ACID    Collection Time: 08/31/20  9:10 PM   Result Value Ref Range    Lactic Acid (POC) 1.22 0.40 - 2.00 mmol/L   CBC WITH AUTOMATED DIFF    Collection Time: 08/31/20  9:12 PM   Result Value Ref Range    WBC 18.6 (H) 4.1 - 11.1 K/uL    RBC 5.84 (H) 4.10 - 5.70 M/uL    HGB 16.1 12.1 - 17.0 g/dL    HCT 49.1 36.6 - 50.3 %    MCV 84.1 80.0 - 99.0 FL    MCH 27.6 26.0 - 34.0 PG    MCHC 32.8 30.0 - 36.5 g/dL    RDW 15.9 (H) 11.5 - 14.5 %    PLATELET 980 032 - 163 K/uL    MPV 9.6 8.9 - 12.9 FL    NRBC 0.0 0  WBC    ABSOLUTE NRBC 0.00 0.00 - 0.01 K/uL    NEUTROPHILS 74 32 - 75 %    LYMPHOCYTES 19 12 - 49 %    MONOCYTES 5 5 - 13 %    EOSINOPHILS 1 0 - 7 %    BASOPHILS 0 0 - 1 %    IMMATURE GRANULOCYTES 1 (H) 0.0 - 0.5 %    ABS. NEUTROPHILS 14.0 (H) 1.8 - 8.0 K/UL    ABS. LYMPHOCYTES 3.5 0.8 - 3.5 K/UL    ABS. MONOCYTES 0.9 0.0 - 1.0 K/UL    ABS. EOSINOPHILS 0.1 0.0 - 0.4 K/UL    ABS. BASOPHILS 0.1 0.0 - 0.1 K/UL    ABS. IMM. GRANS. 0.1 (H) 0.00 - 0.04 K/UL    DF AUTOMATED     METABOLIC PANEL, COMPREHENSIVE    Collection Time: 08/31/20  9:12 PM   Result Value Ref Range    Sodium 137 136 - 145 mmol/L    Potassium 3.8 3.5 - 5.1 mmol/L    Chloride 101 97 - 108 mmol/L    CO2 31 21 - 32 mmol/L    Anion gap 5 5 - 15 mmol/L    Glucose 102 (H) 65 - 100 mg/dL    BUN 8 6 - 20 MG/DL    Creatinine 1.08 0.70 - 1.30 MG/DL    BUN/Creatinine ratio 7 (L) 12 - 20      GFR est AA >60 >60 ml/min/1.73m2    GFR est non-AA >60 >60 ml/min/1.73m2    Calcium 9.7 8.5 - 10.1 MG/DL    Bilirubin, total 0.4 0.2 - 1.0 MG/DL    ALT (SGPT) 34 12 - 78 U/L    AST (SGOT) 22 15 - 37 U/L    Alk.  phosphatase 92 45 - 117 U/L    Protein, total 8.3 (H) 6.4 - 8.2 g/dL    Albumin 3.7 3.5 - 5.0 g/dL    Globulin 4.6 (H) 2.0 - 4.0 g/dL    A-G Ratio 0.8 (L) 1.1 - 2.2     LIPASE    Collection Time: 08/31/20  9:12 PM   Result Value Ref Range    Lipase 512 (H) 73 - 393 U/L   CULTURE, BLOOD    Collection Time: 08/31/20  9:12 PM    Specimen: Blood   Result Value Ref Range    Special Requests: NO SPECIAL REQUESTS      Culture result: NO GROWTH AFTER 10 HOURS     ETHYL ALCOHOL    Collection Time: 08/31/20  9:12 PM   Result Value Ref Range    ALCOHOL(ETHYL),SERUM <10 <10 MG/DL   URINALYSIS W/ REFLEX CULTURE    Collection Time: 08/31/20  9:12 PM    Specimen: Urine   Result Value Ref Range    Color YELLOW/STRAW      Appearance CLEAR CLEAR      Specific gravity 1.015 1.003 - 1.030      pH (UA) 7.5 5.0 - 8.0      Protein Negative NEG mg/dL    Glucose Negative NEG mg/dL    Ketone Negative NEG mg/dL    Bilirubin Negative NEG      Blood Negative NEG      Urobilinogen 0.2 0.2 - 1.0 EU/dL    Nitrites Negative NEG      Leukocyte Esterase Negative NEG      UA:UC IF INDICATED CULTURE NOT INDICATED BY UA RESULT CNI      WBC 0-4 0 - 4 /hpf    RBC 0-5 0 - 5 /hpf    Epithelial cells FEW FEW /lpf    Bacteria Negative NEG /hpf    Hyaline cast 0-2 0 - 5 /lpf   DRUG SCREEN, URINE    Collection Time: 08/31/20  9:12 PM   Result Value Ref Range    AMPHETAMINES Negative NEG      BARBITURATES Negative NEG      BENZODIAZEPINES Negative NEG      COCAINE Negative NEG      METHADONE Negative NEG      OPIATES Positive (A) NEG      PCP(PHENCYCLIDINE) Negative NEG      THC (TH-CANNABINOL) Negative NEG      Drug screen comment (NOTE)    CULTURE, BLOOD    Collection Time: 08/31/20 10:12 PM    Specimen: Blood   Result Value Ref Range    Special Requests: NO SPECIAL REQUESTS      Culture result: NO GROWTH AFTER 9 HOURS     METABOLIC PANEL, COMPREHENSIVE    Collection Time: 09/01/20  3:35 AM   Result Value Ref Range    Sodium 136 136 - 145 mmol/L    Potassium 4.0 3.5 - 5.1 mmol/L    Chloride 104 97 - 108 mmol/L    CO2 28 21 - 32 mmol/L    Anion gap 4 (L) 5 - 15 mmol/L    Glucose 103 (H) 65 - 100 mg/dL    BUN 6 6 - 20 MG/DL    Creatinine 0.86 0.70 - 1.30 MG/DL    BUN/Creatinine ratio 7 (L) 12 - 20      GFR est AA >60 >60 ml/min/1.73m2    GFR est non-AA >60 >60 ml/min/1.73m2    Calcium 8.9 8.5 - 10.1 MG/DL    Bilirubin, total 0.4 0.2 - 1.0 MG/DL    ALT (SGPT) 28 12 - 78 U/L    AST (SGOT) 21 15 - 37 U/L    Alk. phosphatase 79 45 - 117 U/L    Protein, total 7.2 6.4 - 8.2 g/dL    Albumin 3.4 (L) 3.5 - 5.0 g/dL    Globulin 3.8 2.0 - 4.0 g/dL    A-G Ratio 0.9 (L) 1.1 - 2.2     CBC WITH AUTOMATED DIFF    Collection Time: 09/01/20  3:35 AM   Result Value Ref Range    WBC 18.1 (H) 4.1 - 11.1 K/uL    RBC 5.50 4. 10 - 5.70 M/uL    HGB 15.1 12.1 - 17.0 g/dL    HCT 46.8 36.6 - 50.3 %    MCV 85.1 80.0 - 99.0 FL    MCH 27.5 26.0 - 34.0 PG    MCHC 32.3 30.0 - 36.5 g/dL    RDW 15.9 (H) 11.5 - 14.5 %    PLATELET 983 924 - 878 K/uL    MPV 9.7 8.9 - 12.9 FL    NRBC 0.0 0  WBC    ABSOLUTE NRBC 0.00 0.00 - 0.01 K/uL    NEUTROPHILS 73 32 - 75 %    LYMPHOCYTES 20 12 - 49 %    MONOCYTES 5 5 - 13 %    EOSINOPHILS 1 0 - 7 %    BASOPHILS 0 0 - 1 %    IMMATURE GRANULOCYTES 1 (H) 0.0 - 0.5 %    ABS. NEUTROPHILS 13.1 (H) 1.8 - 8.0 K/UL    ABS. LYMPHOCYTES 3.7 (H) 0.8 - 3.5 K/UL    ABS. MONOCYTES 1.0 0.0 - 1.0 K/UL    ABS. EOSINOPHILS 0.1 0.0 - 0.4 K/UL    ABS. BASOPHILS 0.1 0.0 - 0.1 K/UL    ABS. IMM. GRANS. 0.1 (H) 0.00 - 0.04 K/UL    DF AUTOMATED     PROTHROMBIN TIME + INR    Collection Time: 09/01/20  3:35 AM   Result Value Ref Range    INR 1.0 0.9 - 1.1      Prothrombin time 10.8 9.0 - 11.1 sec   LIPASE    Collection Time: 09/01/20  3:35 AM   Result Value Ref Range    Lipase 308 73 - 393 U/L     IMAGING RESULTS:   [x]      I have personally reviewed the actual   []    CXR  [x]    CT  []     US  CT ABD PELV W CONT 8/31/20  FINDINGS:   LOWER THORAX: No significant abnormality in the incidentally imaged lower chest.  LIVER: No mass. BILIARY TREE: Gallbladder is within normal limits. CBD is not dilated. SPLEEN: within normal limits.   PANCREAS: Improved pancreatic ductal dilatation. ADRENALS: Unremarkable. KIDNEYS: No mass, calculus, or hydronephrosis. STOMACH: Unremarkable. SMALL BOWEL: Focal collection adjacent to the second portion of duodenum is  again noted now measuring 3.7 x 1.9 cm. This is slightly increased in size  although not significantly. There is persistent thickening of the duodenum this  region. COLON: Fluid-filled colon  APPENDIX: Unremarkable  PERITONEUM: No ascites or pneumoperitoneum. RETROPERITONEUM: Diffuse atherosclerotic disease in the abdominal aorta and  branch vessels. No evidence of aneurysm  REPRODUCTIVE ORGANS: Enlarged prostate  URINARY BLADDER: Distended urinary bladder  BONES: No destructive bone lesion. ABDOMINAL WALL: No mass or hernia. IMPRESSION:  1. Persistent fluid collection adjacent to the second portion of the duodenum  which is slightly increased in size although not significantly. 2.  Persistent adjacent wall thickening of the duodenum. 3.  Other incidental findings as described    Recommendations/Plan:      Active Problems:    Duodenal abscess (8/27/2020)      Abdominal pain (9/1/2020)       ___________________________________________________  RECOMMENDATIONS:    60yo M with RUQ abdominal pain, nausea, and abnormal CT associated growing periduodenal fluid collection and reported hx of pancreatitis The etiology of FC cannot be discerned as was described as hematoma like by radiologist who drained fluid on 8/13/20. The absence of pancreatic fluid on 8/13 and again now, make pancreatic origin less likely. DDx given its expanding size included intramural hematoma, contained perforation of duodenal ulceration with communication with lumen, or pseudocyst.  Septation noted by CT make it unlikely that adequate drainage will be obtained percutaneously or by EUS-guided drainage. Fluid could be aspirated to establish if pseudocyst is present but there is significant risk for contamination of cyst in this manner.     Plan:  1) BID PPI  2) IVF   3) Allow CLD  4) EtOH and Tobacco cessation  5) IV narcotic analgesia  6) Will determine if EUS possible- ideally, would like for this FC to develop \"rind\" and delay for as long as possible.  ___________________________________________________  Care Plan discussed with:    [x]    Patient   []    Family   [x]    Nursing   [x]    Attending- Dread Gates  Total Time :  50   minutes   ___________________________________________________  GI: Candace Powell MD

## 2020-09-01 NOTE — PROGRESS NOTES
Pharmacy Clarification of the Prior to Admission Medication Regimen Retrospective to the Admission Medication Reconciliation    The patient was interviewed regarding clarification of the prior to admission medication regimen. Patient present in room and obtained permission from patient to discuss drug regimen with visitor(s) present. Patient was questioned regarding use of any other inhalers, topical products, over the counter medications, herbal medications, vitamin products or ophthalmic/nasal/otic medication use. Information Obtained From: Patient Sergo Anne, Family Member, RX Query    Recommendations/Findings: The following amendments were made to the patient's active medication list on file at [facility]:     1) Additions: None    2) Removals:   Nicotine (NICODERM CQ) 21 mg/25 hr  Pantoprazole (PROTONIX) 20 mg tablet    3) Changes: None    4) Pertinent Pharmacy Findings:  Updated patients preferred outpatient pharmacy to: [facility] .  Patient didn't speak english, showed picture on phone of home medications and updated PTA list accordingly. Asked patient how long he has been taking Ciprofloxacin and Metronidazole, patient did not understand question. Contacted patient's brother Sadaf Barry, brother stated patient picked up Ciprofloxacin and Metronidazole at pharmacy yesterday upon discharge and took 1 dose of each before returning to hospital.     PTA medication list was corrected to the following:     Prior to Admission Medications   Prescriptions Last Dose Informant Taking?   ciprofloxacin HCl (Cipro) 500 mg tablet 8/31/2020 at Unknown time Self Yes   Sig: Take 1 Tab by mouth two (2) times a day for 5 days. metroNIDAZOLE (FlagyL) 500 mg tablet 8/31/2020 at Unknown time Self Yes   Sig: Take 1 Tab by mouth three (3) times daily for 5 days. ondansetron hcl (Zofran) 4 mg tablet  Self Yes   Sig: Take 1 Tab by mouth every eight (8) hours as needed for Nausea or Vomiting.    oxyCODONE-acetaminophen (Percocet) 5-325 mg per tablet  Self Yes   Sig: Take 1 Tab by mouth every six (6) hours as needed for Pain for up to 3 days. Max Daily Amount: 4 Tabs.       Facility-Administered Medications: None          Thank you,  Deb BARTHOLOMEW Do, hT  Medication History Pharmacy Technician

## 2020-09-01 NOTE — ED NOTES
Spoke with hospitalist Dr. Arin Logan and received verbal order for clear liquid diet. Nutrition services called with meal tray ordered.  Gave patient popsicle

## 2020-09-01 NOTE — ED NOTES
Pt presents ambulatory to ed c/o of right sided abdominal pain as well as nausea and vomiting that began last week but has been worsening. Pt also reports some generalized chest pain that began today around 2PM.     Pt denies HA, blurry vision, sob, and changes other than increase in pain and also denies any cv problems. 10:01 PM: pt reports pain is still 10/10 however, pt is quietly resting in bed. 10:12 PM: pt to CT      6:06 AM: Pt still reports 8/10 pain and reports that he cannot take PO medication as he cannot keep it down. Pt not due for Morphine PRN order. Spoke With Dr. Boris Rodrigues regarding the pt's pain and he asks to place a verbal order in for 2mg morphine IV once. 7:22 AM: Bedside shift change report given to Dennys (oncoming nurse) by Bee Robles (offgoing nurse). Report included the following information SBAR, Kardex, ED Summary, [facility] and Recent Results.

## 2020-09-01 NOTE — ED PROVIDER NOTES
EMERGENCY DEPARTMENT HISTORY AND PHYSICAL EXAM      Please note that this dictation was completed with the assistance of \"Dragon\", the computer voice recognition software. Quite often unanticipated grammatical, syntax, homophones, and other interpretive errors are inadvertently transcribed by the computer software. Please disregard these errors and any errors that have escaped final proofreading. Thank you. Date: 2020  Patient Name: Odell Severs  : 1960  MRN: [id number]  History of Presenting Illness     Chief Complaint   Patient presents with    Abdominal Pain     RLQ pain into R flank pain, arrives via EMS. Discharged from hospital today. EMS reports possible diagnosis of pancreatitis. History Provided By: Patient and EMS    HPI: Odell Severs, 64 y.o. male with past medical history as documented below presents to the ED with c/o of moderate to severe right sided abdominal and epigastric pain. Pt reports pain is unrelieved with home medications. Of note, pt recently admitted for perforated duodenal ulcer with perforation and just discharged earlier today after hospitalization for fluid collection in pancrease. Pt did receive IV Zosyn while hospitalized. He was discharged home on Flagyl and Cipro but pt states has not been able to consume anything since discharge. Per chart review, pt was seen by Surgery and was treated medically. Pt reports pain does radiate to his back, reports nausea but no emesis. Pt denies any other alleviating or exacerbating factors. Additionally, pt specifically denies any recent fever, chills, headache, CP, SOB, lightheadedness, dizziness, numbness, weakness, lower extremity swelling, heart palpitations, urinary sxs, diarrhea, constipation, melena, hematochezia, cough, or congestion. There are no other complaints, changes or physical findings pertinent to the HPI at this time.     PCP: Jani Luis MD    Past History   Past Medical History:  Past Medical History:   Diagnosis Date    PUD (peptic ulcer disease)        Past Surgical History:  Past Surgical History:   Procedure Laterality Date    HX CHOLECYSTECTOMY      UPPER GI ENDOSCOPY,BIOPSY  8/19/2020         UPPER GI ENDOSCOPY,BIOPSY  8/28/2020            Family History:  Denies    Social History:  Social History     Tobacco Use    Smoking status: Current Every Day Smoker    Smokeless tobacco: Never Used   Substance Use Topics    Alcohol use: Yes     Comment: socially    Drug use: Never       Allergies:  No Known Allergies    Current Medications:  Current Facility-Administered Medications on File Prior to Encounter   Medication Dose Route Frequency Provider Last Rate Last Dose    [DISCONTINUED] HYDROmorphone (PF) (DILAUDID) injection 1 mg  1 mg IntraVENous Q3H PRN Lola Beavers NP   1 mg at 08/31/20 0801    [DISCONTINUED] sodium chloride (NS) flush 5-40 mL  5-40 mL IntraVENous Milana Goyal MD   10 mL at 08/31/20 0500    [DISCONTINUED] sodium chloride (NS) flush 5-40 mL  5-40 mL IntraVENous PRN Momo Ward MD   10 mL at 08/30/20 0244    [DISCONTINUED] sodium chloride (NS) flush 5-40 mL  5-40 mL IntraVENous Q8H Carlos Montes MD   10 mL at 08/31/20 0500    [DISCONTINUED] sodium chloride (NS) flush 5-40 mL  5-40 mL IntraVENous PRN Carlos Montes MD   10 mL at 08/27/20 1523    [DISCONTINUED] acetaminophen (TYLENOL) tablet 650 mg  650 mg Oral Q6H PRN Carlos Montes MD        [DISCONTINUED] acetaminophen (TYLENOL) suppository 650 mg  650 mg Rectal Q6H PRN Carlos Montes MD        [DISCONTINUED] polyethylene glycol (MIRALAX) packet 17 g  17 g Oral DAILY PRN Carlos Montes MD        [DISCONTINUED] promethazine (PHENERGAN) tablet 12.5 mg  12.5 mg Oral Q6H PRN Carlos Montes MD        [DISCONTINUED] ondansetron (ZOFRAN) injection 4 mg  4 mg IntraVENous Q6H PRN Carlos Montes MD   4 mg at 08/31/20 0922    [DISCONTINUED] enoxaparin (LOVENOX) injection 30 mg  30 mg SubCUTAneous DAILY Lalita Zuniga MD   30 mg at 08/31/20 8350    [DISCONTINUED] piperacillin-tazobactam (ZOSYN) 3.375 g in 0.9% sodium chloride (MBP/ADV) 100 mL  3.375 g IntraVENous Q8H Lalita Zuniga MD 25 mL/hr at 08/31/20 0452 3.375 g at 08/31/20 0452    [DISCONTINUED] hydrALAZINE (APRESOLINE) 20 mg/mL injection 10 mg  10 mg IntraVENous Q6H PRN Lalita Zuniga MD   10 mg at 08/29/20 1557    [DISCONTINUED] pantoprazole (PROTONIX) 40 mg in 0.9% sodium chloride 10 mL injection  40 mg IntraVENous Q12H Lalita Zuniga MD   40 mg at 08/31/20 5219    [DISCONTINUED] nicotine (NICODERM CQ) 21 mg/24 hr patch 1 Patch  1 Patch TransDERmal DAILY Lalita Zuniga MD         Current Outpatient Medications on File Prior to Encounter   Medication Sig Dispense Refill    nicotine (NICODERM CQ) 21 mg/24 hr 1 Patch by TransDERmal route daily for 30 days. 30 Patch 0    metroNIDAZOLE (FlagyL) 500 mg tablet Take 1 Tab by mouth three (3) times daily for 5 days. 15 Tab 0    ciprofloxacin HCl (Cipro) 500 mg tablet Take 1 Tab by mouth two (2) times a day for 5 days. 10 Tab 0    ondansetron hcl (Zofran) 4 mg tablet Take 1 Tab by mouth every eight (8) hours as needed for Nausea or Vomiting. 20 Tab 0    oxyCODONE-acetaminophen (Percocet) 5-325 mg per tablet Take 1 Tab by mouth every six (6) hours as needed for Pain for up to 3 days. Max Daily Amount: 4 Tabs. 12 Tab 0    pantoprazole (PROTONIX) 20 mg tablet Take 1 Tab by mouth daily. 30 Tab 0     Review of Systems   Review of Systems   Constitutional: Positive for appetite change. Negative for chills and fever. HENT: Negative. Negative for congestion, facial swelling, rhinorrhea, sore throat, trouble swallowing and voice change. Eyes: Negative. Respiratory: Negative. Negative for apnea, cough, chest tightness, shortness of breath and wheezing. Cardiovascular: Negative. Negative for chest pain, palpitations and leg swelling. Gastrointestinal: Positive for abdominal pain and nausea. Negative for abdominal distention, blood in stool, constipation, diarrhea and vomiting. Endocrine: Negative. Negative for cold intolerance, heat intolerance and polyuria. Genitourinary: Negative. Negative for difficulty urinating, dysuria, flank pain, frequency, hematuria and urgency. Musculoskeletal: Negative. Negative for arthralgias, back pain, myalgias, neck pain and neck stiffness. Skin: Negative. Negative for color change and rash. Neurological: Negative. Negative for dizziness, syncope, facial asymmetry, speech difficulty, weakness, light-headedness, numbness and headaches. Hematological: Negative. Does not bruise/bleed easily. Psychiatric/Behavioral: Negative. Negative for confusion and self-injury. The patient is not nervous/anxious. Physical Exam   Physical Exam  Vitals signs and nursing note reviewed. Constitutional:       General: He is in acute distress. Appearance: He is well-developed. He is ill-appearing and toxic-appearing. HENT:      Head: Normocephalic and atraumatic. Mouth/Throat:      Pharynx: No posterior oropharyngeal erythema. Eyes:      Conjunctiva/sclera: Conjunctivae normal.      Pupils: Pupils are equal, round, and reactive to light. Neck:      Musculoskeletal: Normal range of motion. Cardiovascular:      Rate and Rhythm: Normal rate and regular rhythm. Heart sounds: Normal heart sounds. No murmur. No friction rub. No gallop. Pulmonary:      Effort: Pulmonary effort is normal. No respiratory distress. Breath sounds: Normal breath sounds. No wheezing or rales. Chest:      Chest wall: No tenderness. Abdominal:      General: Bowel sounds are normal. There is no distension. Palpations: Abdomen is soft. There is no mass. Tenderness: There is abdominal tenderness in the right upper quadrant and right lower quadrant. There is no guarding or rebound. Musculoskeletal: Normal range of motion.          General: No tenderness or deformity. Skin:     General: Skin is warm. Findings: No rash. Neurological:      Mental Status: He is alert and oriented to person, place, and time. Cranial Nerves: No cranial nerve deficit. Motor: No abnormal muscle tone. Coordination: Coordination normal.      Deep Tendon Reflexes: Reflexes normal.   Psychiatric:         Behavior: Behavior is cooperative.          Diagnostic Study Results     Labs -  Recent Results (from the past 24 hour(s))   CBC W/O DIFF    Collection Time: 08/31/20  3:29 AM   Result Value Ref Range    WBC 10.0 4.1 - 11.1 K/uL    RBC 5.43 4.10 - 5.70 M/uL    HGB 14.8 12.1 - 17.0 g/dL    HCT 47.4 36.6 - 50.3 %    MCV 87.3 80.0 - 99.0 FL    MCH 27.3 26.0 - 34.0 PG    MCHC 31.2 30.0 - 36.5 g/dL    RDW 15.9 (H) 11.5 - 14.5 %    PLATELET 089 358 - 887 K/uL    MPV 9.8 8.9 - 12.9 FL    NRBC 0.0 0  WBC    ABSOLUTE NRBC 0.00 0.00 - 3.55 K/uL   METABOLIC PANEL, BASIC    Collection Time: 08/31/20  3:29 AM   Result Value Ref Range    Sodium 138 136 - 145 mmol/L    Potassium 3.6 3.5 - 5.1 mmol/L    Chloride 102 97 - 108 mmol/L    CO2 32 21 - 32 mmol/L    Anion gap 4 (L) 5 - 15 mmol/L    Glucose 91 65 - 100 mg/dL    BUN 9 6 - 20 MG/DL    Creatinine 1.02 0.70 - 1.30 MG/DL    BUN/Creatinine ratio 9 (L) 12 - 20      GFR est AA >60 >60 ml/min/1.73m2    GFR est non-AA >60 >60 ml/min/1.73m2    Calcium 9.2 8.5 - 10.1 MG/DL   LIPASE    Collection Time: 08/31/20  3:29 AM   Result Value Ref Range    Lipase 399 (H) 73 - 393 U/L   EKG, 12 LEAD, INITIAL    Collection Time: 08/31/20  9:08 PM   Result Value Ref Range    Ventricular Rate 78 BPM    Atrial Rate 78 BPM    P-R Interval 130 ms    QRS Duration 82 ms    Q-T Interval 370 ms    QTC Calculation (Bezet) 421 ms    Calculated P Axis 78 degrees    Calculated R Axis 69 degrees    Calculated T Axis 73 degrees    Diagnosis       Normal sinus rhythm  Normal ECG  When compared with ECG of 13-AUG-2020 03:52,  No significant change was found POC LACTIC ACID    Collection Time: 08/31/20  9:10 PM   Result Value Ref Range    Lactic Acid (POC) 1.22 0.40 - 2.00 mmol/L   CBC WITH AUTOMATED DIFF    Collection Time: 08/31/20  9:12 PM   Result Value Ref Range    WBC 18.6 (H) 4.1 - 11.1 K/uL    RBC 5.84 (H) 4.10 - 5.70 M/uL    HGB 16.1 12.1 - 17.0 g/dL    HCT 49.1 36.6 - 50.3 %    MCV 84.1 80.0 - 99.0 FL    MCH 27.6 26.0 - 34.0 PG    MCHC 32.8 30.0 - 36.5 g/dL    RDW 15.9 (H) 11.5 - 14.5 %    PLATELET 303 237 - 775 K/uL    MPV 9.6 8.9 - 12.9 FL    NRBC 0.0 0  WBC    ABSOLUTE NRBC 0.00 0.00 - 0.01 K/uL    NEUTROPHILS 74 32 - 75 %    LYMPHOCYTES 19 12 - 49 %    MONOCYTES 5 5 - 13 %    EOSINOPHILS 1 0 - 7 %    BASOPHILS 0 0 - 1 %    IMMATURE GRANULOCYTES 1 (H) 0.0 - 0.5 %    ABS. NEUTROPHILS 14.0 (H) 1.8 - 8.0 K/UL    ABS. LYMPHOCYTES 3.5 0.8 - 3.5 K/UL    ABS. MONOCYTES 0.9 0.0 - 1.0 K/UL    ABS. EOSINOPHILS 0.1 0.0 - 0.4 K/UL    ABS. BASOPHILS 0.1 0.0 - 0.1 K/UL    ABS. IMM. GRANS. 0.1 (H) 0.00 - 0.04 K/UL    DF AUTOMATED     METABOLIC PANEL, COMPREHENSIVE    Collection Time: 08/31/20  9:12 PM   Result Value Ref Range    Sodium 137 136 - 145 mmol/L    Potassium 3.8 3.5 - 5.1 mmol/L    Chloride 101 97 - 108 mmol/L    CO2 31 21 - 32 mmol/L    Anion gap 5 5 - 15 mmol/L    Glucose 102 (H) 65 - 100 mg/dL    BUN 8 6 - 20 MG/DL    Creatinine 1.08 0.70 - 1.30 MG/DL    BUN/Creatinine ratio 7 (L) 12 - 20      GFR est AA >60 >60 ml/min/1.73m2    GFR est non-AA >60 >60 ml/min/1.73m2    Calcium 9.7 8.5 - 10.1 MG/DL    Bilirubin, total 0.4 0.2 - 1.0 MG/DL    ALT (SGPT) 34 12 - 78 U/L    AST (SGOT) 22 15 - 37 U/L    Alk.  phosphatase 92 45 - 117 U/L    Protein, total 8.3 (H) 6.4 - 8.2 g/dL    Albumin 3.7 3.5 - 5.0 g/dL    Globulin 4.6 (H) 2.0 - 4.0 g/dL    A-G Ratio 0.8 (L) 1.1 - 2.2     LIPASE    Collection Time: 08/31/20  9:12 PM   Result Value Ref Range    Lipase 512 (H) 73 - 393 U/L   ETHYL ALCOHOL    Collection Time: 08/31/20  9:12 PM   Result Value Ref Range ALCOHOL(ETHYL),SERUM <10 <10 MG/DL   URINALYSIS W/ REFLEX CULTURE    Collection Time: 08/31/20  9:12 PM    Specimen: Urine   Result Value Ref Range    Color YELLOW/STRAW      Appearance CLEAR CLEAR      Specific gravity 1.015 1.003 - 1.030      pH (UA) 7.5 5.0 - 8.0      Protein Negative NEG mg/dL    Glucose Negative NEG mg/dL    Ketone Negative NEG mg/dL    Bilirubin Negative NEG      Blood Negative NEG      Urobilinogen 0.2 0.2 - 1.0 EU/dL    Nitrites Negative NEG      Leukocyte Esterase Negative NEG      UA:UC IF INDICATED CULTURE NOT INDICATED BY UA RESULT CNI      WBC 0-4 0 - 4 /hpf    RBC 0-5 0 - 5 /hpf    Epithelial cells FEW FEW /lpf    Bacteria Negative NEG /hpf    Hyaline cast 0-2 0 - 5 /lpf   DRUG SCREEN, URINE    Collection Time: 08/31/20  9:12 PM   Result Value Ref Range    AMPHETAMINES Negative NEG      BARBITURATES Negative NEG      BENZODIAZEPINES Negative NEG      COCAINE Negative NEG      METHADONE Negative NEG      OPIATES Positive (A) NEG      PCP(PHENCYCLIDINE) Negative NEG      THC (TH-CANNABINOL) Negative NEG      Drug screen comment (NOTE)        Radiologic Studies -   CT ABD PELV W CONT   Final Result   IMPRESSION:   1. Persistent fluid collection adjacent to the second portion of the duodenum   which is slightly increased in size although not significantly. 2.  Persistent adjacent wall thickening of the duodenum. 3.  Other incidental findings as described        CT Results  (Last 48 hours)               08/31/20 2219  CT ABD PELV W CONT Final result    Impression:  IMPRESSION:   1. Persistent fluid collection adjacent to the second portion of the duodenum   which is slightly increased in size although not significantly. 2.  Persistent adjacent wall thickening of the duodenum. 3.  Other incidental findings as described       Narrative:  EXAM: CT ABD PELV W CONT       INDICATION: pain       COMPARISON: 8/25/2020        CONTRAST: 100 mL of Isovue-370.        TECHNIQUE: Following the uneventful intravenous administration of contrast, thin axial   images were obtained through the abdomen and pelvis. Coronal and sagittal   reconstructions were generated. Oral contrast was administered. CT dose   reduction was achieved through use of a standardized protocol tailored for this   examination and automatic exposure control for dose modulation. FINDINGS:    LOWER THORAX: No significant abnormality in the incidentally imaged lower chest.   LIVER: No mass. BILIARY TREE: Gallbladder is within normal limits. CBD is not dilated. SPLEEN: within normal limits. PANCREAS: Improved pancreatic ductal dilatation. ADRENALS: Unremarkable. KIDNEYS: No mass, calculus, or hydronephrosis. STOMACH: Unremarkable. SMALL BOWEL: Focal collection adjacent to the second portion of duodenum is   again noted now measuring 3.7 x 1.9 cm. This is slightly increased in size   although not significantly. There is persistent thickening of the duodenum this   region. COLON: Fluid-filled colon   APPENDIX: Unremarkable   PERITONEUM: No ascites or pneumoperitoneum. RETROPERITONEUM: Diffuse atherosclerotic disease in the abdominal aorta and   branch vessels. No evidence of aneurysm   REPRODUCTIVE ORGANS: Enlarged prostate   URINARY BLADDER: Distended urinary bladder   BONES: No destructive bone lesion. ABDOMINAL WALL: No mass or hernia. ADDITIONAL COMMENTS: N/A               CXR Results  (Last 48 hours)    None          Medical Decision Making   I reviewed the vital signs, available nursing notes, past medical history, past surgical history, family history and social history. Vital Signs-Reviewed the patient's vital signs.   Patient Vitals for the past 24 hrs:   Temp Pulse Resp BP SpO2   08/31/20 2328 98 °F (36.7 °C) 82 18 167/83 98 %   08/31/20 2215  76 19  98 %   08/31/20 2200  80 19 192/85 97 %   08/31/20 2145  83 14 189/81 93 %   08/31/20 1959 99.1 °F (37.3 °C) (!) 105 18 (!) 182/127 98 %       Pulse Oximetry Analysis - 98% on RA    Cardiac Monitor:   Rate: 83 bpm  Rhythm: Normal Sinus Rhythm      ED EKG interpretation:  Rhythm: normal sinus rhythm; and regular . Rate (approx.): 78; Axis: normal; P wave: normal; QRS interval: normal ; ST/T wave: normal; Other findings: normal. This EKG was interpreted by Lina Thompson M.D. Records Reviewed: Nursing Notes, Old Medical Records, Previous electrocardiograms, Previous Radiology Studies and Previous Laboratory Studies    Provider Notes (Medical Decision Making):   Pt presents with acute abdominal pain; vital signs stable with currently a non-peritoneal exam; DDx includes: Gastroenteritis, hepatitis, pancreatitis, obstruction, appendicitis, viral illness, IBD, diverticulitis, mesenteric ischemia, AAA or descending dissection, ACS, kidney stone. Will get labs, treat symptomatically and obtain serial abdominal exams to determine if additional imaging is indicated. Will reassess and monitor closely. ED Course:   I am the first provider for this patient's ED visit today. Initial assessment performed. I discussed presenting problems, concerns and my formulated plan for today's visit with the patient and any available family members at bedside. I encouraged them to ask questions as they arise throughout the visit. TOBACCO COUNSELING:  Upon evaluation, pt expressed that they are a current tobacco user. For approximately 10 minutes, pt has been counseled on the dangers of smoking and was encouraged to quit as soon as possible in order to decrease further risks to their health. Pt has conveyed their understanding of the risks involved should they continue to use tobacco products. ALCOHOL/SUBSTANCE ABUSE COUNSELING:  Upon evaluation, pt endorsed recent alcohol/illicit drug use.  For approximately 7 minutes, pt has been counseled on the dangers of alcohol and illicit drug use on their health, and they were encouraged to quit as soon as possible in order to decrease further risks to their health. Pt has conveyed their understanding of the risks involved should they continue to use these products. I reviewed our electronic medical record system for any past medical records that were available that may contribute to the patient's current condition, the nursing notes and vital signs from today's visit. Tate Abel MD    ED Orders Placed :  Orders Placed This Encounter    MECHANICAL PROPHYLAXIS IS CONTRAINDICATED Other, please document Already on Anticoagulation    CULTURE, BLOOD    CULTURE, BLOOD    CT ABD PELV W CONT    CBC WITH AUTOMATED DIFF    COMPREHENSIVE METABOLIC PANEL    LIPASE    BLOOD ALCOHOL (Ethyl Alcohol)    URINALYSIS W/ REFLEX CULTURE    DRUG SCREEN, URINE    METABOLIC PANEL, COMPREHENSIVE    CBC WITH AUTOMATED DIFF    PROTHROMBIN TIME + INR    LIPASE    DIET NPO    POC LACTIC ACID    NOTIFY PROVIDER: SPECIFY Notify provider on pt's arrival to floor ONE TIME STAT    BEDREST, [address] PER UNIT ROUTINE    INTAKE AND OUTPUT    NOTIFY PROVIDER: SPECIFY Notify provider on pt's arrival to floor ONE TIME STAT    BEDREST, COMPLETE    INTAKE AND OUTPUT    VITAL SIGNS [address] NOTIFY PROVIDER: SPECIFY Notify physician for pulse less than 50 or greater than 120, respiratory rate less than 12 or greater than 25, oral temperature greater than 101.3 F (75.8 C), systolic BP less than 90 or greater than 618, diastolic BP less. ..    ACTIVITY AS TOLERATED W/ASSIST    INTAKE AND OUTPUT    NURSING-MISCELLANEOUS: Palpate, scan or straight cath and document bladder residual as needed CONTINUOUS    FULL CODE    IP CONSULT TO GASTROENTEROLOGY    POC LACTIC ACID    EKG, 12 LEAD, INITIAL    INSERT PERIPHERAL IV ONE TIME STAT    ondansetron (ZOFRAN) injection 4 mg    iohexol (OMNIPAQUE) ORAL mixture    sodium chloride 0.9 % bolus infusion 1,000 mL    morphine injection 4 mg    DISCONTD: ondansetron [facility]) injection 4 mg    fentaNYL citrate (PF) injection 50 mcg    iopamidoL (ISOVUE-370) 76 % injection    piperacillin-tazobactam (ZOSYN) 3.375 g in 0.9% sodium chloride (MBP/ADV) 100 mL    morphine injection 4 mg    ondansetron (ZOFRAN) injection 4 mg    pantoprazole (PROTONIX) 40 mg in 0.9% sodium chloride 10 mL injection    sodium chloride (NS) flush 5-40 mL    sodium chloride (NS) flush 5-40 mL    OR Linked Order Group     acetaminophen (TYLENOL) tablet 650 mg     acetaminophen (TYLENOL) suppository 650 mg    polyethylene glycol (MIRALAX) packet 17 g    OR Linked Order Group     promethazine (PHENERGAN) tablet 12.5 mg     ondansetron (ZOFRAN) injection 4 mg    morphine injection 4 mg    0.9% sodium chloride infusion    piperacillin-tazobactam (ZOSYN) 3.375 g in 0.9% sodium chloride (MBP/ADV) 100 mL    IP CONSULT TO HOSPITALIST    IP CONSULT TO GENERAL SURGERY    INITIAL PHYSICIAN ORDER: INPATIENT Medical; 3.  Patient receiving treatment that can only be provided in an inpatient setting (further clarification in H&P documentation)     ED Medications Administered:  Medications   pantoprazole (PROTONIX) 40 mg in 0.9% sodium chloride 10 mL injection (40 mg IntraVENous Given 9/6/20 2106)   sodium chloride (NS) flush 5-40 mL (0 mL IntraVENous Held 9/6/20 2200)   sodium chloride (NS) flush 5-40 mL (10 mL IntraVENous Given 9/3/20 0926)   acetaminophen (TYLENOL) tablet 650 mg (has no administration in time range)     Or   acetaminophen (TYLENOL) suppository 650 mg (has no administration in time range)   polyethylene glycol (MIRALAX) packet 17 g (has no administration in time range)   promethazine (PHENERGAN) tablet 12.5 mg ( Oral See Alternative 9/2/20 1944)     Or   ondansetron (ZOFRAN) injection 4 mg (4 mg IntraVENous Given 9/2/20 1944)   0.9% sodium chloride infusion (125 mL/hr IntraVENous New Bag 9/6/20 2107)   piperacillin-tazobactam (ZOSYN) 3.375 g in 0.9% sodium chloride (MBP/ADV) 100 mL (3.375 g IntraVENous New Bag 9/6/20 2106)   nicotine (NICODERM CQ) 14 mg/24 hr patch 1 Patch (has no administration in time range)   amLODIPine (NORVASC) tablet 5 mg (5 mg Oral Given 9/6/20 0906)   sodium chloride (NS) flush 10 mL ( IntraVENous Canceled Entry 9/4/20 1100)   morphine injection 2 mg (2 mg IntraVENous Given 9/6/20 2101)   ondansetron (ZOFRAN) injection 4 mg (4 mg IntraVENous Given 8/31/20 2117)   iohexol (OMNIPAQUE) ORAL mixture (50 mL Oral Given 8/31/20 2119)   sodium chloride 0.9 % bolus infusion 1,000 mL (0 mL IntraVENous IV Completed 8/31/20 2212)   morphine injection 4 mg (4 mg IntraVENous Given 8/31/20 2118)   fentaNYL citrate (PF) injection 50 mcg (50 mcg IntraVENous Given 8/31/20 2210)   iopamidoL (ISOVUE-370) 76 % injection (100 mL  Given 8/31/20 2223)   piperacillin-tazobactam (ZOSYN) 3.375 g in 0.9% sodium chloride (MBP/ADV) 100 mL (0 g IntraVENous IV Completed 9/1/20 0016)   morphine injection 4 mg (4 mg IntraVENous Given 9/1/20 0342)   morphine injection 2 mg (2 mg IntraVENous Given 9/1/20 0616)   iopamidoL (ISOVUE-370) 76 % injection 100 mL (100 mL IntraVENous Given 9/4/20 1507)   diatrizoate maria alejandra-diatrizoat sod (MD-GASTROVIEW,GASTROGRAFIN) 66-10 % contrast solution 30 mL (30 mL Oral Given 9/4/20 1116)           Progress Note:  Labs reviewed. WBC 18.6 (normal yesterday), Hgb 16.1, lipase 512, CT shows increased size of fluid collection on medial wall of duodenum now 3.7cm x 1.9cm with septations with persistent duodenal wall thickening, but no pancreatic abnormality. Consult Note:  Jeanette Torre MD spoke with Dr. Regi Castellano,   Specialty: General Surgery  Discussed pt's hx, disposition, and available diagnostic and imaging results. Reviewed care plans. Agree with management and plan thus far. Consultant has reviewed chart, recommend GI consult for ERCP with EUS, no surgical intervention indicated. Agree with IV abx for now, keep NPO.     Consult Note:  Jeanette Torre MD spoke with Dr. Yeison Burnham, Specialty: Hospitalist  Discussed pt's hx, disposition, and available diagnostic and imaging results. Reviewed care plans. Agree with management and plan thus far. Consultant will evaluate pt for admission. Disposition: Admit  Patient is being admitted to the hospital. The results of their tests and reasons for their admission have been discussed with them and/or available family. I have discussed the case with the admitting specialist and expressed my high concern for decompensation if patient is discharged from the ED. The patient and/or available family convey agreement and understanding for the need to be admitted and for their admission diagnosis. Consultation has been made with the inpatient physician specialist for hospitalization. Diagnosis   Clinical Impression:   1. Alcohol-induced chronic pancreatitis ([address]    2. Pancreatic pseudocyst    3. SIRS (systemic inflammatory response syndrome) (HCC)    4. Acute abdominal pain    5. Right upper quadrant abdominal pain    6. Duodenal abscess        Attestation:  Venessa Benitez MD, am the attending of record for this patient. I personally performed the services described in this documentation on this date, 8/31/2020 for patient, Odell Severs. I have reviewed the chart and verified that the record is accurate and complete. This note will not be viewable in [address]

## 2020-09-01 NOTE — PROGRESS NOTES
Hospitalist Progress Note    NAME: Moose Sharma   :  1960   MRN:  [id number]       Assessment / Plan:  Abdominal pain-POA Per last GI Note Pancreatic pseudocyst, likely cause of duodenal inflammation and fluid collection   CT ABD PELV W CONT    1. Persistent fluid collection adjacent to the second portion of the duodenum  which is slightly increased in size although not significantly. 2.  Persistent adjacent wall thickening of the duodenum. 3.  Other incidental findings as described below  Elevated lipase level 512. Patient was seen by GI Dr Dennise Gramajo      EGD ON 2020  Findings:   Esophagus:normal   Stomach: normal   Duodenum: Area of inflamed mucosa medial wall, second portion of   duodenum about 3 by 5 cm. Some of mucosa heaped up, but all soft. Multiple biopsies obtained. Never saw Frank Ibrahim. No visible ulcer   Therapies:  biopsy of duodenal second portion   Recommendations:   -Await pathology. , -Next step most likely will be EUS of pancrease.     -We will keep n.p.o.after MN , allow clears   -IV PPI  -IV analgesia  -IV fluids  -ED MD spoke with on-call surgeon who recommended GI consultation for possible ERCP and EUS and no acute surgical intervention needed  -We will closely monitor CBC CMP               History of ETOH abuse   Tobacco abuse   · Denies ETOH since 2019  · Current every day smoker   · Patient educated on the importance of smoking cessation and the health benefits associated with quitting.    · Nicotine patch ordered         Code Status: Full  Surrogate Decision Maker:  Cecilia Ruiz  [phone]             DVT Prophylaxis: Hold anticoagulation for possible procedure in a.m. GI Prophylaxis: not indicated     Baseline: Independent    18.5 - 24.9 Normal weight / Body mass index is 18.94 kg/m². Code status: Full     Subjective:     Chief Complaint / Reason for Physician Visit  FU abdominal pain .  spke in Malagasy with patient , reported pain on his abdomen  Discussed with RN events overnight. Review of Systems:  Symptom Y/N Comments  Symptom Y/N Comments   Fever/Chills n   Chest Pain n    Poor Appetite    Edema     Cough n   Abdominal Pain y    Sputum    Joint Pain     SOB/GARZA n   Pruritis/Rash     Nausea/vomit n   Tolerating PT/OT     Diarrhea    Tolerating Diet     Constipation    Other       Could NOT obtain due to:      Objective:     VITALS:   Last 24hrs VS reviewed since prior progress note. Most recent are:  Patient Vitals for the past 24 hrs:   Temp Pulse Resp BP SpO2   09/01/20 1643 97.6 °F (36.4 °C) 70 20 117/61 100 %   09/01/20 1330    149/77    09/01/20 1200 98.1 °F (36.7 °C) 65 13 137/61 99 %   09/01/20 1130  62 12 122/52 98 %   09/01/20 0900  70 18 119/45 97 %   09/01/20 0800 98.1 °F (36.7 °C) 69 16 142/78 98 %   09/01/20 0600 98.1 °F (36.7 °C) 72 17 132/69 99 %   09/01/20 0430  89 19 152/85 97 %   09/01/20 0415  91 20 160/85 96 %   09/01/20 0330 98.6 °F (37 °C) 85 12 (!) 128/98 99 %   09/01/20 0300  80 17 158/82 97 %   09/01/20 0230  82 18 158/77 97 %   09/01/20 0145  81 27 151/69 96 %   09/01/20 0045  80 17 167/83 97 %   09/01/20 0015  80 19 163/81 97 %   08/31/20 2328 98 °F (36.7 °C) 82 18 167/83 98 %   08/31/20 2215  76 19  98 %   08/31/20 2200  80 19 192/85 97 %   08/31/20 2145  83 14 189/81 93 %   08/31/20 1959 99.1 °F (37.3 °C) (!) 105 18 (!) 182/127 98 %       Intake/Output Summary (Last 24 hours) at 9/1/2020 1849  Last data filed at 9/1/2020 0016  Gross per 24 hour   Intake 1100 ml   Output 650 ml   Net 450 ml        PHYSICAL EXAM:  General: WD, WN. Alert, cooperative, no acute distress    EENT:  EOMI. Anicteric sclerae. MMM  Resp:  CTA bilaterally, no wheezing or rales.   No accessory muscle use  CV:  Regular  rhythm,  No edema  GI:  Soft, Non distended, Non tender.  +Bowel sounds  Neurologic:  Alert and oriented X 3, normal speech,   Psych:   Good insight. Not anxious nor agitated  Skin:  No reed. No jaundice    Reviewed most current lab test results and cultures  YES  Reviewed most current radiology test results   YES  Review and summation of old records today    NO  Reviewed patient's current orders and MAR    YES  PMH/SH reviewed - no change compared to H&P  ________________________________________________________________________  Care Plan discussed with:    Comments   Patient x    Family      RN     Care Manager     Consultant                        Multidiciplinary team rounds were held today with , nursing, pharmacist and clinical coordinator. Patient's plan of care was discussed; medications were reviewed and discharge planning was addressed. ________________________________________________________________________  Total NON critical care TIME:  25  Minutes    Total CRITICAL CARE TIME Spent:   Minutes non procedure based      Comments   >50% of visit spent in counseling and coordination of care     ________________________________________________________________________  Ileana Alegria MD     Procedures: see electronic medical records for all procedures/Xrays and details which were not copied into this note but were reviewed prior to creation of Plan. LABS:  I reviewed today's most current labs and imaging studies.   Pertinent labs include:  Recent Labs     09/01/20 0335 08/31/20 2112 08/31/20  0329   WBC 18.1* 18.6* 10.0   HGB 15.1 16.1 14.8   HCT 46.8 49.1 47.4    385 350     Recent Labs     09/01/20 0335 08/31/20 2112 08/31/20  0329    137 138   K 4.0 3.8 3.6    101 102   CO2 28 31 32   * 102* 91   BUN 6 8 9   CREA 0.86 1.08 1.02   CA 8.9 9.7 9.2   ALB 3.4* 3.7  --    TBILI 0.4 0.4  --    ALT 28 34  --    INR 1.0  --   --        Signed: Ileana Alegria MD

## 2020-09-02 PROCEDURE — 65270000029 HC RM PRIVATE

## 2020-09-02 PROCEDURE — 99223 1ST HOSP IP/OBS HIGH 75: CPT | Performed by: SURGERY

## 2020-09-02 PROCEDURE — C9113 INJ PANTOPRAZOLE SODIUM, VIA: HCPCS | Performed by: HOSPITALIST

## 2020-09-02 PROCEDURE — 74011000250 HC RX REV CODE- 250: Performed by: HOSPITALIST

## 2020-09-02 PROCEDURE — 74011000258 HC RX REV CODE- 258: Performed by: HOSPITALIST

## 2020-09-02 PROCEDURE — 74011250636 HC RX REV CODE- 250/636: Performed by: HOSPITALIST

## 2020-09-02 RX ORDER — IBUPROFEN 200 MG
1 TABLET ORAL DAILY PRN
Status: DISCONTINUED | OUTPATIENT
Start: 2020-09-02 | End: 2020-09-08 | Stop reason: HOSPADM

## 2020-09-02 RX ADMIN — Medication 10 ML: at 21:24

## 2020-09-02 RX ADMIN — MORPHINE SULFATE 4 MG: 2 INJECTION, SOLUTION INTRAMUSCULAR; INTRAVENOUS at 15:23

## 2020-09-02 RX ADMIN — MORPHINE SULFATE 4 MG: 2 INJECTION, SOLUTION INTRAMUSCULAR; INTRAVENOUS at 23:34

## 2020-09-02 RX ADMIN — SODIUM CHLORIDE 100 ML/HR: 900 INJECTION, SOLUTION INTRAVENOUS at 18:57

## 2020-09-02 RX ADMIN — PIPERACILLIN AND TAZOBACTAM 3.38 G: 3; .375 INJECTION, POWDER, LYOPHILIZED, FOR SOLUTION INTRAVENOUS at 15:24

## 2020-09-02 RX ADMIN — SODIUM CHLORIDE 40 MG: 9 INJECTION, SOLUTION INTRAMUSCULAR; INTRAVENOUS; SUBCUTANEOUS at 21:24

## 2020-09-02 RX ADMIN — ONDANSETRON 4 MG: 2 INJECTION INTRAMUSCULAR; INTRAVENOUS at 08:40

## 2020-09-02 RX ADMIN — ONDANSETRON 4 MG: 2 INJECTION INTRAMUSCULAR; INTRAVENOUS at 19:44

## 2020-09-02 RX ADMIN — MORPHINE SULFATE 4 MG: 2 INJECTION, SOLUTION INTRAMUSCULAR; INTRAVENOUS at 02:47

## 2020-09-02 RX ADMIN — SODIUM CHLORIDE 100 ML/HR: 900 INJECTION, SOLUTION INTRAVENOUS at 08:39

## 2020-09-02 RX ADMIN — PIPERACILLIN AND TAZOBACTAM 3.38 G: 3; .375 INJECTION, POWDER, LYOPHILIZED, FOR SOLUTION INTRAVENOUS at 21:26

## 2020-09-02 RX ADMIN — SODIUM CHLORIDE 40 MG: 9 INJECTION, SOLUTION INTRAMUSCULAR; INTRAVENOUS; SUBCUTANEOUS at 08:40

## 2020-09-02 RX ADMIN — MORPHINE SULFATE 4 MG: 2 INJECTION, SOLUTION INTRAMUSCULAR; INTRAVENOUS at 06:20

## 2020-09-02 RX ADMIN — MORPHINE SULFATE 4 MG: 2 INJECTION, SOLUTION INTRAMUSCULAR; INTRAVENOUS at 11:02

## 2020-09-02 RX ADMIN — MORPHINE SULFATE 4 MG: 2 INJECTION, SOLUTION INTRAMUSCULAR; INTRAVENOUS at 19:43

## 2020-09-02 RX ADMIN — Medication 10 ML: at 15:23

## 2020-09-02 RX ADMIN — PIPERACILLIN AND TAZOBACTAM 3.38 G: 3; .375 INJECTION, POWDER, LYOPHILIZED, FOR SOLUTION INTRAVENOUS at 06:23

## 2020-09-02 RX ADMIN — Medication 10 ML: at 06:21

## 2020-09-02 NOTE — PROGRESS NOTES
General Surgery End of Shift Nursing Note    Bedside shift change report given to [facility] RN(oncoming nurse) by Frida La RN (offgoing nurse). Report included the following information SBAR, Kardex, Intake/Output, MAR and Recent Results.         Torres Steiner

## 2020-09-02 NOTE — PROGRESS NOTES
GI Progress Note Johanna Long)  NAME:Aquiles Chacko :1960 VQK:[id number]   ATTG: Scar Alvarez MD  PCP: Jani Luis MD  Date/Time:  2020 8:54 AM   Assessment:   · Abnl GI CT with periduodenal FC on medial wall of d1-d2- prior aspirate fluid described as c/w hematoma per radiologist, but fluid only sent for cx (all negative)  · Duodenitis- noted on bx with no obvious ulcer noted  · RUQ/Epig abd pain- no evidence of pancreatitis on CT and LFTs normal.  S/p Jennifer in 3/2020  · Leukocytosis     Plan:   · Allow diet  · Continue abx  · Continue narcotic analgesia  · Will discuss with colleagues further re:EUS; aspirate has high risk for seeding FC      Subjective:   Discussed with RN events overnight. C/o persistent Epig and RUQ pain. Notes narcotics wear off quickly. States he wants to eat. Complaint Y/N Description   Abdominal Pain y RUQ>Epig   Hematemesis n    Hematochezia n    Melena n    Constipation n    Diarrhea n    Dyspepsia n    Dysphagia n    Jaundiced n    Nausea/vomiting n      Review of Systems:  Symptom Y/N Comments  Symptom Y/N Comments   Fever/Chills n   Chest Pain n    Cough n   Headaches n    Sputum n   Joint Pain n    SOB/GARZA n   Pruritis/Rash n    Tolerating Diet  NPO  Other       Could NOT obtain due to:      Objective:   VITALS:   Last 24hrs VS reviewed since prior progress note. Most recent are:  Visit Vitals  /73   Pulse 65   Temp 98.2 °F (36.8 °C)   Resp 18   Ht 5' 11\" (1.803 m)   Wt 61.6 kg (135 lb 12.9 oz)   SpO2 98%   BMI 18.94 kg/m²       Intake/Output Summary (Last 24 hours) at 2020 0854  Last data filed at 2020 0653  Gross per 24 hour   Intake 2240 ml   Output    Net 2240 ml     PHYSICAL EXAM:  General: WD, WN. Alert, cooperative, no acute distress    HEENT: NC, Atraumatic. PERRL. Anicteric sclerae. Poor dentition. Lungs:  CTA Bilaterally. No Wheezing/Rhonchi/Rales. Heart:  Regular  rhythm,  No murmur/Rub/Gallops  Abdomen: Soft, Non distended, +RUQ/Epig tender.  +BS  Extremities: No c/c/e  Neurologic:  CN 2-12 gi, A/O X 3. No acute neurological distress   Psych:   Good insight. Not anxious nor agitated. Lab and Radiology Data Reviewed: (see below)    Medications Reviewed: (see below)  PMH/SH reviewed - no change compared to H&P  ________________________________________________________________________  Total time spent with patient: 15 minutes ________________________________________________________________________  Care Plan discussed with:  Patient y   Family     RN y              Consultant:       Troy Gates MD     Procedures: see electronic medical records for all procedures/Xrays and details which were not copied into this note but were reviewed prior to creation of Plan. LABS:  Recent Labs     09/01/20 0335 08/31/20 2112   WBC 18.1* 18.6*   HGB 15.1 16.1   HCT 46.8 49.1    385     Recent Labs     09/01/20 0335 08/31/20 2112 08/31/20 0329    137 138   K 4.0 3.8 3.6    101 102   CO2 28 31 32   BUN 6 8 9   CREA 0.86 1.08 1.02   * 102* 91   CA 8.9 9.7 9.2     Recent Labs     09/01/20 0335 08/31/20 2112 08/31/20  0329   AP 79 92  --    TP 7.2 8.3*  --    ALB 3.4* 3.7  --    GLOB 3.8 4.6*  --    LPSE 308 512* 399*     Recent Labs     09/01/20 0335   INR 1.0   PTP 10.8      No results for input(s): FE, TIBC, PSAT, FERR in the last 72 hours. No results found for: FOL, RBCF  No results for input(s): PH, PCO2, PO2 in the last 72 hours. No results for input(s): CPK, CKMB in the last 72 hours.     No lab exists for component: TROPONINI  Lab Results   Component Value Date/Time    Color YELLOW/STRAW 08/31/2020 09:12 PM    Appearance CLEAR 08/31/2020 09:12 PM    Specific gravity 1.015 08/31/2020 09:12 PM    Specific gravity 1.019 08/27/2020 01:34 PM    pH (UA) 7.5 08/31/2020 09:12 PM    Protein Negative 08/31/2020 09:12 PM    Glucose Negative 08/31/2020 09:12 PM    Ketone Negative 08/31/2020 09:12 PM    Bilirubin Negative 08/31/2020 09:12 PM    Urobilinogen 0.2 08/31/2020 09:12 PM    Nitrites Negative 08/31/2020 09:12 PM    Leukocyte Esterase Negative 08/31/2020 09:12 PM    Epithelial cells FEW 08/31/2020 09:12 PM    Bacteria Negative 08/31/2020 09:12 PM    WBC 0-4 08/31/2020 09:12 PM    RBC 0-5 08/31/2020 09:12 PM       MEDICATIONS:  Current Facility-Administered Medications   Medication Dose Route Frequency    pantoprazole (PROTONIX) 40 mg in 0.9% sodium chloride 10 mL injection  40 mg IntraVENous Q12H    sodium chloride (NS) flush 5-40 mL  5-40 mL IntraVENous Q8H    sodium chloride (NS) flush 5-40 mL  5-40 mL IntraVENous PRN    acetaminophen (TYLENOL) tablet 650 mg  650 mg Oral Q6H PRN    Or    acetaminophen (TYLENOL) suppository 650 mg  650 mg Rectal Q6H PRN    polyethylene glycol (MIRALAX) packet 17 g  17 g Oral DAILY PRN    promethazine (PHENERGAN) tablet 12.5 mg  12.5 mg Oral Q6H PRN    Or    ondansetron (ZOFRAN) injection 4 mg  4 mg IntraVENous Q6H PRN    morphine injection 4 mg  4 mg IntraVENous Q3H PRN    0.9% sodium chloride infusion  100 mL/hr IntraVENous CONTINUOUS    piperacillin-tazobactam (ZOSYN) 3.375 g in 0.9% sodium chloride (MBP/ADV) 100 mL  3.375 g IntraVENous Q8H

## 2020-09-02 NOTE — CONSULTS
Surgery Consult, second opinion    NOTE:   Patient is [place] and had consultation with translation services and his wife was present and daughter via cell phone who speaks English    Subjective:      Cynthia Munguia is a 64 y.o. male who I am asked to see in consultation for persistent abdominal pain and a periduodenal process. He has been having intermittent abdominal pain over the last 10 months or more. He was previously worked up at [facility] and has had prior EUS, lap pedrito, CTs and MRI (records not available) but by report had duodenitis previously on biopsy. He then presented here in August with severe abdominal pain. At that time, there was a moderate sized collection and it was aspirated and thought to be a hematoma or similar. He was discharged home but quickly developed recurrent nausea, vomiting and abdominal pain. He is a heavy drinker but claims he has not been drinking since his last discharge. His pain is controlled with IV opioids and he is now tolerating full liquids. At this time he still has some nausea but denies vomiting, diarrhea, constipation, melena, hematochezia, dysuria, hematuria, fever, chills or sweats. He is very frustrated with the situation. Past Medical History:   Diagnosis Date    PUD (peptic ulcer disease)      Past Surgical History:   Procedure Laterality Date    HX CHOLECYSTECTOMY      UPPER GI ENDOSCOPY,BIOPSY  8/19/2020         UPPER GI ENDOSCOPY,BIOPSY  8/28/2020           No family history on file.   Social History     Socioeconomic History    Marital status: SINGLE     Spouse name: Not on file    Number of children: Not on file    Years of education: Not on file    Highest education level: Not on file   Tobacco Use    Smoking status: Current Every Day Smoker    Smokeless tobacco: Never Used   Substance and Sexual Activity    Alcohol use: Yes     Comment: socially    Drug use: Never      Current Facility-Administered Medications Medication Dose Route Frequency Provider Last Rate Last Dose    nicotine (NICODERM CQ) 14 mg/24 hr patch 1 Patch  1 Patch TransDERmal DAILY PRN José Miguel Barker NP        pantoprazole (PROTONIX) 40 mg in 0.9% sodium chloride 10 mL injection  40 mg IntraVENous Q12H Ange Solares MD   40 mg at 20 0840    sodium chloride (NS) flush 5-40 mL  5-40 mL IntraVENous Q8H Ange Solares MD   10 mL at 20 1523    sodium chloride (NS) flush 5-40 mL  5-40 mL IntraVENous PRN Salena Mehta MD   10 mL at 20 0805    acetaminophen (TYLENOL) tablet 650 mg  650 mg Oral Q6H PRN Din, Indio Kemp MD        Or    acetaminophen (TYLENOL) suppository 650 mg  650 mg Rectal Q6H PRN Din, Indio Kemp MD        polyethylene glycol (MIRALAX) packet 17 g  17 g Oral DAILY PRN Beck, Indio Kemp MD        promethazine (PHENERGAN) tablet 12.5 mg  12.5 mg Oral Q6H PRN Beck, Indio Kemp MD        Or    ondansetron (ZOFRAN) injection 4 mg  4 mg IntraVENous Q6H PRN Salena Mehta MD   4 mg at 20 0840    morphine injection 4 mg  4 mg IntraVENous Q3H PRN Salena Mehta MD   4 mg at 20 1523    0.9% sodium chloride infusion  100 mL/hr IntraVENous CONTINUOUS Salena Mehta  mL/hr at 20 0839 100 mL/hr at 20 0839    piperacillin-tazobactam (ZOSYN) 3.375 g in 0.9% sodium chloride (MBP/ADV) 100 mL  3.375 g IntraVENous Q8H Ange Solares MD 25 mL/hr at 20 1524 3.375 g at 20 1524        No Known Allergies    Review of Systems:  A comprehensive review of systems was negative except for that written in the History of Present Illness.     Objective:        Patient Vitals for the past 8 hrs:   BP Temp Pulse Resp SpO2   20 1516 148/65 98 °F (36.7 °C) 65 18 100 %   20 1145 152/75 97.9 °F (36.6 °C) 68 18 100 %   20 1106 155/65 98.3 °F (36.8 °C) 70 18 100 %       Temp (24hrs), Av.9 °F (36.6 °C), Min:97.5 °F (36.4 °C), Max:98.3 °F (36.8 °C)      Physical Exam:  General:  Alert, cooperative, no distress, appears stated age. Eyes:  Conjunctivae/corneas clear. anicteric               Neck: Supple, symmetrical, trachea midline, no adenopathy, thyroid: no enlargment/tenderness/nodules, no carotid bruit and no JVD. Back:   Symmetric, no curvature. ROM normal. No CVA tenderness. Lungs:   Clear to auscultation bilaterally. Heart:  Regular rate and rhythm, S1, S2 normal, no murmur, click, rub or gallop. Abdomen:   Soft, non-distended mildy tender in RUQ/epigastric region without rebound or guarding. Bowel sounds normal. No masses,  No organomegaly. Extremities: Extremities normal, atraumatic, no cyanosis or edema. Pulses: 2+ and symmetric all extremities. Skin: Skin color, texture, turgor normal. No rashes or lesions   Lymph nodes: Cervical, supraclavicular, and axillary nodes normal.   Neurologic: CNII-XII intact. Normal strength, sensation and reflexes throughout. I reviewed his prior CT scans with the radiologist  Assessment:     Hospital Problems  Date Reviewed: 8/28/2020          Codes Class Noted POA    Abdominal pain ICD-10-CM: R10.9  ICD-9-CM: 789.00  9/1/2020 Unknown        Duodenal abscess ICD-10-CM: K63.0  ICD-9-CM: 569.5  8/27/2020 Unknown            This is a very complex process in the wall of the duodenum more so than the pancreas. It is unclear if this started with pancreatitis or duodenitis (small perforation into the medial wall) or other benign/malignant process. The area of question is smaller than previously noted but is now more dense. It is possible that the prior aspiration allowed bleeding into the cystic process and that it has not resolved. It is unclear if it is infected or not. There is also a note in the chart about a possible tumor in the region but I do not have that report. This may have just been a medial contained perforation of the duodenal wall from duodenitis.       Plan:     Options to deal with the area include possible percutaneous drainage but this caries a high risk of colon/duodenal injury given the location on the CT with a difficult window, EUS/EGD with fenestration into the duodenal lumen with risk of perforation or inadequate drainage, and surgical exploration with duodenotomy with fenestration. There is a note in the chart about a possible tumor there as well that complicates the situation. At this point I would recommend close observation with pain control and IV abx.      I would plan for a repeat CT Friday or early next week to reassess the area    Thank you for allowing me to assist in the care of this patient    Sharon Vogel MD FACS

## 2020-09-02 NOTE — PROGRESS NOTES
General Surgery End of Shift Nursing Note    Bedside shift change report given to X (oncoming nurse) by [facility] YOVANY ZHENG (offgoing nurse). Report included the following information SBAR, Kardex, Intake/Output, MAR and Recent Results. Shift worked:   7a-7p   Summary of shift:    Pt resting comfortably in bed during this shift. Medicated for pain per MAR. Otherwise uneventful shift for this pt. Issues for physician to address:   None     Number times ambulated in hallway past shift: 0    Number of times OOB to chair past shift: 0    Pain Management:  Current medication: morphine  Patient states pain is manageable on current pain medication: YES    GI:    Current diet:  DIET FULL LIQUID  DIET NUTRITIONAL SUPPLEMENTS All Meals; Ensure [place] current diet: YES  Passing flatus: YES  Last Bowel Movement: yesterday   Appearance: watery    Respiratory:    Incentive Spirometer at bedside: YES  Patient instructed on use: YES    Patient Safety:    Falls Score: 1  Bed Alarm On? No  Sitter?  No    Laurel Ramsay RN

## 2020-09-02 NOTE — PROGRESS NOTES
Reason for Readmission:    Abdominal pain          RUR Score/Risk Level:   10      PCP: First and Last name:  Sourav Garner MD   Name of Practice:    Are you a current patient: Yes/No: Yes   Approximate date of last visit: last week or two   Can you participate in a virtual visit with your PCP:     Is a Care Conference indicated:       Did you attend your follow up appointment (s): If not, why not:         Resources/supports as identified by patient/family:   Wife, brother, sister       Top Challenges facing patient (as identified by patient/family and CM): Finances/Medication cost?  No issues identified     Transportation    No issues identified    Support system or lack thereof? Living arrangements? Lives with wife in brothers home      Self-care/ADLs/Cognition? Independent        Current Advanced Directive/Advance Care Plan:             Plan for utilizing home health: TBD               Transition of Care Plan:    Based on readmission, the patient's previous Plan of Care   has been evaluated and/or modified. The current Transition of Care Plan is:         CM met with pt at bedside and used blue phone to complete assessment. Patient d/c [facility] on 8/31. Prior to admission, pt was independent with ADL/IADL. Patient denies past HH/Rehab and DME use. Patients support system includes, wife, brother and sister. No needs or concerns identified at this time. Patient gave permission for CM to speak with his brother if needed. CM will continue to follow. PCP-Dr Art[address] Management Interventions  PCP Verified by CM: Naresh Luevano MD)  Mode of Transport at Discharge:  Other (see comment)(brother)  Transition of Care Consult (CM Consult): Discharge Planning  Discharge Durable Medical Equipment: No(No DME use)  Physical Therapy Consult: No  Occupational Therapy Consult: No  Speech Therapy Consult: No  Current Support Network: Relative's Home, Lives with Spouse(Lives in brothers home)  Discharge Location  Discharge Placement: (Anticipate home w/family)      [facility]  Ext [phone]    [place] Plan:     *Home w/family   *brother to transport at d/c

## 2020-09-02 NOTE — PROGRESS NOTES
Hospitalist Progress Note    NAME: Odell Severs   :  1960   MRN:  [id number]       Assessment / Plan:  Abdominal pain-POA Per last GI Note Pancreatic pseudocyst, likely cause of duodenal inflammation and fluid collection   CT ABD PELV W CONT    1. Persistent fluid collection adjacent to the second portion of the duodenum  which is slightly increased in size although not significantly. 2.  Persistent adjacent wall thickening of the duodenum. 3.  Other incidental findings as described below  Elevated lipase level 512. Patient was seen by GI Dr Doss [facility]      EGD ON 2020  Findings:   Esophagus:normal   Stomach: normal   Duodenum: Area of inflamed mucosa medial wall, second portion of   duodenum about 3 by 5 cm. Some of mucosa heaped up, but all soft. Multiple biopsies obtained. Never saw Tamiko Villanueva. No visible ulcer   Therapies:  biopsy of duodenal second portion   Recommendations:   -Await pathology. , -Next step most likely will be EUS of pancreas     -allow  Full liquid   -IV PPI  -IV analgesia  -IV fluids, IV abx   GI will discsuss other colluagues to decide on EUS , aspirate has hgh risk of seeding   -ED MD spoke with on-call surgeon who recommended GI consultation for possible ERCP and EUS and no acute surgical intervention needed  -We will closely monitor CBC CMP               History of ETOH abuse   Tobacco abuse   · Denies ETOH since 2019  · Current every day smoker   · Patient educated on the importance of smoking cessation and the health benefits associated with quitting.    · Nicotine patch ordered         Code Status: Full  Surrogate Decision Maker:  Jewels Hamilton  [phone]    [phone]             DVT Prophylaxis: Hold anticoagulation for possible procedure in a.m. GI Prophylaxis: not indicated     Baseline: Independent    18.5 - 24.9 Normal weight / Body mass index is 18.94 kg/m².     Code status: Full     Subjective:     Chief Complaint / Reason for Physician Visit  FU abdominal pain . spke in [place]ic through blue phone  with patient , reported pain on his abdomen and is frustrated that he is not getting surgery , he reported that this issue is recurrent. Discussed with RN events overnight. Review of Systems:  Symptom Y/N Comments  Symptom Y/N Comments   Fever/Chills n   Chest Pain n    Poor Appetite    Edema     Cough n   Abdominal Pain y    Sputum    Joint Pain     SOB/GARZA n   Pruritis/Rash     Nausea/vomit n   Tolerating PT/OT     Diarrhea    Tolerating Diet     Constipation    Other       Could NOT obtain due to:      Objective:     VITALS:   Last 24hrs VS reviewed since prior progress note. Most recent are:  Patient Vitals for the past 24 hrs:   Temp Pulse Resp BP SpO2   09/02/20 0733 98.2 °F (36.8 °C) 65 18 145/73 98 %   09/02/20 0243 97.5 °F (36.4 °C) 62 16 149/73 99 %   09/01/20 2323 97.6 °F (36.4 °C) 66 16 128/63 98 %   09/01/20 2000 97.6 °F (36.4 °C) 60 18 164/82 99 %   09/01/20 1643 97.6 °F (36.4 °C) 70 20 117/61 100 %   09/01/20 1330    149/77    09/01/20 1200 98.1 °F (36.7 °C) 65 13 137/61 99 %   09/01/20 1130  62 12 122/52 98 %       Intake/Output Summary (Last 24 hours) at 9/2/2020 1001  Last data filed at 9/2/2020 0653  Gross per 24 hour   Intake 2240 ml   Output    Net 2240 ml        PHYSICAL EXAM:  General: WD, WN. Alert, cooperative, no acute distress    EENT:  EOMI. Anicteric sclerae. MMM  Resp:  CTA bilaterally, no wheezing or rales. No accessory muscle use  CV:  Regular  rhythm,  No edema  GI:  Soft, Non distended, Non tender.  +Bowel sounds  Neurologic:  Alert and oriented X 3, normal speech,   Psych:  fair insight. Not anxious nor agitated  Skin:  No rashes.   No jaundice    Reviewed most current lab test results and cultures  YES  Reviewed most current radiology test results   YES  Review and summation of old records today    NO  Reviewed patient's current orders and MAR    YES  PMH/SH reviewed - no change compared to H&P  ________________________________________________________________________  Care Plan discussed with:    Comments   Patient x    Family      RN x    Care Manager     Consultant                        Multidiciplinary team rounds were held today with , nursing, pharmacist and clinical coordinator. Patient's plan of care was discussed; medications were reviewed and discharge planning was addressed. ________________________________________________________________________  Total NON critical care TIME:  35 Minutes    Total CRITICAL CARE TIME Spent:   Minutes non procedure based      Comments   >50% of visit spent in counseling and coordination of care     ________________________________________________________________________  Mary Dickson MD     Procedures: see electronic medical records for all procedures/Xrays and details which were not copied into this note but were reviewed prior to creation of Plan. LABS:  I reviewed today's most current labs and imaging studies.   Pertinent labs include:  Recent Labs     09/01/20 0335 08/31/20 2112 08/31/20  0329   WBC 18.1* 18.6* 10.0   HGB 15.1 16.1 14.8   HCT 46.8 49.1 47.4    385 350     Recent Labs     09/01/20 0335 08/31/20 2112 08/31/20  0329    137 138   K 4.0 3.8 3.6    101 102   CO2 28 31 32   * 102* 91   BUN 6 8 9   CREA 0.86 1.08 1.02   CA 8.9 9.7 9.2   ALB 3.4* 3.7  --    TBILI 0.4 0.4  --    ALT 28 34  --    INR 1.0  --   --        Signed: Mary Dickson MD

## 2020-09-03 LAB
BASOPHILS # BLD: 0.1 K/UL (ref 0–0.1)
BASOPHILS NFR BLD: 1 % (ref 0–1)
DIFFERENTIAL METHOD BLD: ABNORMAL
EOSINOPHIL # BLD: 0.4 K/UL (ref 0–0.4)
EOSINOPHIL NFR BLD: 3 % (ref 0–7)
ERYTHROCYTE [DISTWIDTH] IN BLOOD BY AUTOMATED COUNT: 15.1 % (ref 11.5–14.5)
HCT VFR BLD AUTO: 41 % (ref 36.6–50.3)
HGB BLD-MCNC: 12.8 G/DL (ref 12.1–17)
IMM GRANULOCYTES # BLD AUTO: 0 K/UL (ref 0–0.04)
IMM GRANULOCYTES NFR BLD AUTO: 0 % (ref 0–0.5)
LYMPHOCYTES # BLD: 5.1 K/UL (ref 0.8–3.5)
LYMPHOCYTES NFR BLD: 40 % (ref 12–49)
MCH RBC QN AUTO: 27.3 PG (ref 26–34)
MCHC RBC AUTO-ENTMCNC: 31.2 G/DL (ref 30–36.5)
MCV RBC AUTO: 87.4 FL (ref 80–99)
MONOCYTES # BLD: 0.9 K/UL (ref 0–1)
MONOCYTES NFR BLD: 7 % (ref 5–13)
NEUTS SEG # BLD: 6.4 K/UL (ref 1.8–8)
NEUTS SEG NFR BLD: 49 % (ref 32–75)
NRBC # BLD: 0 K/UL (ref 0–0.01)
NRBC BLD-RTO: 0 PER 100 WBC
PLATELET # BLD AUTO: 326 K/UL (ref 150–400)
PMV BLD AUTO: 9.5 FL (ref 8.9–12.9)
RBC # BLD AUTO: 4.69 M/UL (ref 4.1–5.7)
WBC # BLD AUTO: 12.9 K/UL (ref 4.1–11.1)

## 2020-09-03 PROCEDURE — 74011250637 HC RX REV CODE- 250/637: Performed by: INTERNAL MEDICINE

## 2020-09-03 PROCEDURE — 74011250636 HC RX REV CODE- 250/636: Performed by: HOSPITALIST

## 2020-09-03 PROCEDURE — 85025 COMPLETE CBC W/AUTO DIFF WBC: CPT

## 2020-09-03 PROCEDURE — 36415 COLL VENOUS BLD VENIPUNCTURE: CPT

## 2020-09-03 PROCEDURE — 99232 SBSQ HOSP IP/OBS MODERATE 35: CPT | Performed by: SURGERY

## 2020-09-03 PROCEDURE — 74011000250 HC RX REV CODE- 250: Performed by: HOSPITALIST

## 2020-09-03 PROCEDURE — C9113 INJ PANTOPRAZOLE SODIUM, VIA: HCPCS | Performed by: HOSPITALIST

## 2020-09-03 PROCEDURE — 65270000029 HC RM PRIVATE

## 2020-09-03 PROCEDURE — 74011000258 HC RX REV CODE- 258: Performed by: HOSPITALIST

## 2020-09-03 RX ORDER — AMLODIPINE BESYLATE 5 MG/1
5 TABLET ORAL DAILY
Status: DISCONTINUED | OUTPATIENT
Start: 2020-09-03 | End: 2020-09-08 | Stop reason: HOSPADM

## 2020-09-03 RX ADMIN — MORPHINE SULFATE 4 MG: 2 INJECTION, SOLUTION INTRAMUSCULAR; INTRAVENOUS at 22:57

## 2020-09-03 RX ADMIN — MORPHINE SULFATE 4 MG: 2 INJECTION, SOLUTION INTRAMUSCULAR; INTRAVENOUS at 17:01

## 2020-09-03 RX ADMIN — Medication 10 ML: at 06:08

## 2020-09-03 RX ADMIN — MORPHINE SULFATE 4 MG: 2 INJECTION, SOLUTION INTRAMUSCULAR; INTRAVENOUS at 09:26

## 2020-09-03 RX ADMIN — PIPERACILLIN AND TAZOBACTAM 3.38 G: 3; .375 INJECTION, POWDER, LYOPHILIZED, FOR SOLUTION INTRAVENOUS at 21:23

## 2020-09-03 RX ADMIN — AMLODIPINE BESYLATE 5 MG: 5 TABLET ORAL at 11:32

## 2020-09-03 RX ADMIN — MORPHINE SULFATE 4 MG: 2 INJECTION, SOLUTION INTRAMUSCULAR; INTRAVENOUS at 13:50

## 2020-09-03 RX ADMIN — Medication 10 ML: at 09:26

## 2020-09-03 RX ADMIN — SODIUM CHLORIDE 40 MG: 9 INJECTION, SOLUTION INTRAMUSCULAR; INTRAVENOUS; SUBCUTANEOUS at 21:26

## 2020-09-03 RX ADMIN — SODIUM CHLORIDE 40 MG: 9 INJECTION, SOLUTION INTRAMUSCULAR; INTRAVENOUS; SUBCUTANEOUS at 08:13

## 2020-09-03 RX ADMIN — SODIUM CHLORIDE 100 ML/HR: 900 INJECTION, SOLUTION INTRAVENOUS at 06:07

## 2020-09-03 RX ADMIN — MORPHINE SULFATE 4 MG: 2 INJECTION, SOLUTION INTRAMUSCULAR; INTRAVENOUS at 19:55

## 2020-09-03 RX ADMIN — PIPERACILLIN AND TAZOBACTAM 3.38 G: 3; .375 INJECTION, POWDER, LYOPHILIZED, FOR SOLUTION INTRAVENOUS at 13:50

## 2020-09-03 RX ADMIN — MORPHINE SULFATE 4 MG: 2 INJECTION, SOLUTION INTRAMUSCULAR; INTRAVENOUS at 06:09

## 2020-09-03 RX ADMIN — PIPERACILLIN AND TAZOBACTAM 3.38 G: 3; .375 INJECTION, POWDER, LYOPHILIZED, FOR SOLUTION INTRAVENOUS at 06:08

## 2020-09-03 RX ADMIN — MORPHINE SULFATE 4 MG: 2 INJECTION, SOLUTION INTRAMUSCULAR; INTRAVENOUS at 03:01

## 2020-09-03 NOTE — PROGRESS NOTES
General Surgery End of Shift Nursing Note    Bedside shift change report given to Stefany Quiroz RN (oncoming nurse) by Cecil Avelar (offgoing nurse). Report included the following information SBAR, Kardex, Intake/Output, MAR and Recent Results.         Yessenia Calderon

## 2020-09-03 NOTE — PROGRESS NOTES
TRANSFER - OUT REPORT:    Verbal report given to Stacie Cowart RN (name) on Desire Spann  being transferred to Ortho (unit) for routine progression of care       Report consisted of patients Situation, Background, Assessment and   Recommendations(SBAR). Information from the following report(s) SBAR, Kardex, Intake/Output, MAR and Recent Results was reviewed with the receiving nurse. Lines:   Peripheral IV 09/01/20 Anterior;Proximal;Right Forearm (Active)   Site Assessment Clean, dry, & intact 09/02/20 2004   Phlebitis Assessment 0 09/02/20 2004   Infiltration Assessment 0 09/02/20 2004   Dressing Status Clean, dry, & intact 09/02/20 2004   Dressing Type Transparent;Tape 09/02/20 2004   Hub Color/Line Status Blue;Flushed; Infusing 09/02/20 2004   Alcohol Cap Used Yes 09/02/20 1543        Opportunity for questions and clarification was provided.       Patient transported with:   Registered Nurse  Tech

## 2020-09-03 NOTE — PROGRESS NOTES
TRANSFER - IN REPORT:    Verbal report received from Kathy(name) on Quincy Kwon  being received from Surg Tele(unit) for routine progression of care      Report consisted of patients Situation, Background, Assessment and   Recommendations(SBAR). Information from the following report(s) SBAR, Kardex and Intake/Output was reviewed with the receiving nurse. Opportunity for questions and clarification was provided. Assessment completed upon patients arrival to unit and care assumed.

## 2020-09-03 NOTE — PROGRESS NOTES
GI Progress Note Sanjana Chaudhary)  Sebsatian Huerta :1960 URW:[id number]   ATTG: David Hayes MD  PCP: Janki Shea MD  Date/Time:  9/3/2020 1412   Assessment:   · Abnl GI CT with periduodenal FC on medial wall of d1-d2- I suspect this is sequellae of prior pancreatitis or groove pancreatitis as his EtOH use continued until his presentation in 2020 and he presented in beginning of 2020 with elevated lipase. Prior aspirate fluid described as c/w hematoma per radiologist, but fluid only sent for cx (all negative)  · Duodenitis- noted on bx with no obvious ulcer noted  · RUQ/Epig abd pain- no evidence of pancreatitis on CT and LFTs normal.  S/p Jennifer in 3/2020  · Leukocytosis     Plan:   · Get repeat CT in AM 20 to assess for worsening- if looks worse, will need bowel rest, J-tube feeding, pancreatic enzyme supplements, narcotic analgesia, and LT possible Whipple surgery. If no worsening by CT, would consider continued PO  · Continue abx  · Continue narcotic analgesia  · EUS will not be useful other than looking for changes of acute and chronic pancreatitis which we know he has had or has. The fluid should not be aspirated as drainage will be incomplete given septations noted in the [facility] and will put pt at high risk for seeding/infecting the [facility]. Subjective:   Discussed with RN events overnight. C/o persistent Epig and RUQ pain. Notes narcotics wear off quickly. States he does not have increased pain when he eats.     Complaint Y/N Description   Abdominal Pain y RUQ>Epig   Hematemesis n    Hematochezia n    Melena n    Constipation n    Diarrhea n    Dyspepsia n    Dysphagia n    Jaundiced n    Nausea/vomiting n      Review of Systems:  Symptom Y/N Comments  Symptom Y/N Comments   Fever/Chills n   Chest Pain n    Cough n   Headaches n    Sputum n   Joint Pain n    SOB/GARZA n   Pruritis/Rash n    Tolerating Diet  NPO  Other       Could NOT obtain due to:      Objective:   VITALS:   Last 24hrs VS reviewed since prior progress note. Most recent are:  Visit Vitals  /63   Pulse (!) 59   Temp 97.9 °F (36.6 °C)   Resp 18   Ht 5' 11\" (1.803 m)   Wt 61.6 kg (135 lb 12.9 oz)   SpO2 100%   BMI 18.94 kg/m²       Intake/Output Summary (Last 24 hours) at 9/3/2020 1412  Last data filed at 9/3/2020 0700  Gross per 24 hour   Intake 4115 ml   Output    Net 4115 ml     PHYSICAL EXAM:  General: WD, WN. Alert, cooperative, no acute distress    HEENT: NC, Atraumatic. PERRL. Anicteric sclerae. Poor dentition. Lungs:  CTA Bilaterally. No Wheezing/Rhonchi/Rales. Heart:  Regular  rhythm,  No murmur/Rub/Gallops  Abdomen: Soft, Non distended, +RUQ/Epig tender.  +BS  Extremities: No c/c/e  Neurologic:  CN 2-12 gi, A/O X 3. No acute neurological distress   Psych:   Good insight. Not anxious nor agitated. Lab and Radiology Data Reviewed: (see below)    Medications Reviewed: (see below)  PMH/SH reviewed - no change compared to H&P  ________________________________________________________________________  Total time spent with patient: 15 minutes ________________________________________________________________________  Care Plan discussed with:  Patient y   Family     RN               Consultant:  Rosalina Box MD     Procedures: see electronic medical records for all procedures/Xrays and details which were not copied into this note but were reviewed prior to creation of Plan. LABS:  Recent Labs     09/03/20  0257 09/01/20  0335   WBC 12.9* 18.1*   HGB 12.8 15.1   HCT 41.0 46.8    359     Recent Labs     09/01/20 0335 08/31/20 2112    137   K 4.0 3.8    101   CO2 28 31   BUN 6 8   CREA 0.86 1.08   * 102*   CA 8.9 9.7     Recent Labs     09/01/20 0335 08/31/20 2112   AP 79 92   TP 7.2 8.3*   ALB 3.4* 3.7   GLOB 3.8 4.6*   LPSE 308 512*     Recent Labs     09/01/20 0335   INR 1.0   PTP 10.8      No results for input(s): FE, TIBC, PSAT, FERR in the last 72 hours.    No results found for: FOL, RBCF  No results for input(s): PH, PCO2, PO2 in the last 72 hours. No results for input(s): CPK, CKMB in the last 72 hours.     No lab exists for component: TROPONINI  Lab Results   Component Value Date/Time    Color YELLOW/STRAW 08/31/2020 09:12 PM    Appearance CLEAR 08/31/2020 09:12 PM    Specific gravity 1.015 08/31/2020 09:12 PM    Specific gravity 1.019 08/27/2020 01:34 PM    pH (UA) 7.5 08/31/2020 09:12 PM    Protein Negative 08/31/2020 09:12 PM    Glucose Negative 08/31/2020 09:12 PM    Ketone Negative 08/31/2020 09:12 PM    Bilirubin Negative 08/31/2020 09:12 PM    Urobilinogen 0.2 08/31/2020 09:12 PM    Nitrites Negative 08/31/2020 09:12 PM    Leukocyte Esterase Negative 08/31/2020 09:12 PM    Epithelial cells FEW 08/31/2020 09:12 PM    Bacteria Negative 08/31/2020 09:12 PM    WBC 0-4 08/31/2020 09:12 PM    RBC 0-5 08/31/2020 09:12 PM       MEDICATIONS:  Current Facility-Administered Medications   Medication Dose Route Frequency    amLODIPine (NORVASC) tablet 5 mg  5 mg Oral DAILY    nicotine (NICODERM CQ) 14 mg/24 hr patch 1 Patch  1 Patch TransDERmal DAILY PRN    pantoprazole (PROTONIX) 40 mg in 0.9% sodium chloride 10 mL injection  40 mg IntraVENous Q12H    sodium chloride (NS) flush 5-40 mL  5-40 mL IntraVENous Q8H    sodium chloride (NS) flush 5-40 mL  5-40 mL IntraVENous PRN    acetaminophen (TYLENOL) tablet 650 mg  650 mg Oral Q6H PRN    Or    acetaminophen (TYLENOL) suppository 650 mg  650 mg Rectal Q6H PRN    polyethylene glycol (MIRALAX) packet 17 g  17 g Oral DAILY PRN    promethazine (PHENERGAN) tablet 12.5 mg  12.5 mg Oral Q6H PRN    Or    ondansetron (ZOFRAN) injection 4 mg  4 mg IntraVENous Q6H PRN    morphine injection 4 mg  4 mg IntraVENous Q3H PRN    0.9% sodium chloride infusion  100 mL/hr IntraVENous CONTINUOUS    piperacillin-tazobactam (ZOSYN) 3.375 g in 0.9% sodium chloride (MBP/ADV) 100 mL  3.375 g IntraVENous Q8H

## 2020-09-03 NOTE — PROGRESS NOTES
Admit Date: 2020    POD * No surgery found *    Procedure:  * No surgery found *      Assessment:   Active Problems:    Duodenal abscess (2020)      Abdominal pain (2020)      1. Duodenal collection ? ?hematoma, tumor, ? infection  2. Nausea and vomiting improved  3. Still with significant pain  4.  services were utilized (blue phone)  5. ETOH abuse      Plan/Recommendations/Medical Decision Makin. Continue IV Abx  2. Repeat CT tomorrow  3. Soft diet    Subjective:     Patient has complaints of pain. Objective:     Blood pressure 170/80, pulse 62, temperature 97.6 °F (36.4 °C), resp. rate 18, height 5' 11\" (1.803 m), weight 61.6 kg (135 lb 12.9 oz), SpO2 98 %. Temp (24hrs), Av.8 °F (36.6 °C), Min:97.3 °F (36.3 °C), Max:98.3 °F (36.8 °C)      Physical Exam:  LUNG: clear to auscultation bilaterally, HEART: regular rate and rhythm, S1, S2 normal, no murmur, click, rub or gallop, ABDOMEN: soft, non-distended tender in epigastrium/RUQ without rebound/guarding. Bowel sounds normal. No masses,  no organomegaly, EXTREMITIES:  extremities normal, atraumatic, no cyanosis or edema    Labs:   Recent Results (from the past 48 hour(s))   CBC WITH AUTOMATED DIFF    Collection Time: 20  2:57 AM   Result Value Ref Range    WBC 12.9 (H) 4.1 - 11.1 K/uL    RBC 4.69 4.10 - 5.70 M/uL    HGB 12.8 12.1 - 17.0 g/dL    HCT 41.0 36.6 - 50.3 %    MCV 87.4 80.0 - 99.0 FL    MCH 27.3 26.0 - 34.0 PG    MCHC 31.2 30.0 - 36.5 g/dL    RDW 15.1 (H) 11.5 - 14.5 %    PLATELET 597 669 - 380 K/uL    MPV 9.5 8.9 - 12.9 FL    NRBC 0.0 0  WBC    ABSOLUTE NRBC 0.00 0.00 - 0.01 K/uL    NEUTROPHILS 49 32 - 75 %    LYMPHOCYTES 40 12 - 49 %    MONOCYTES 7 5 - 13 %    EOSINOPHILS 3 0 - 7 %    BASOPHILS 1 0 - 1 %    IMMATURE GRANULOCYTES 0 0.0 - 0.5 %    ABS. NEUTROPHILS 6.4 1.8 - 8.0 K/UL    ABS. LYMPHOCYTES 5.1 (H) 0.8 - 3.5 K/UL    ABS. MONOCYTES 0.9 0.0 - 1.0 K/UL    ABS.  EOSINOPHILS 0.4 0.0 - 0.4 K/UL ABS. BASOPHILS 0.1 0.0 - 0.1 K/UL    ABS. IMM.  GRANS. 0.0 0.00 - 0.04 K/UL    DF AUTOMATED         Data Review images and reports reviewed

## 2020-09-03 NOTE — PROGRESS NOTES
Hospitalist Progress Note    NAME: Michell Menjivar   :  1960   MRN:  [id number]       Assessment / Plan:  Abdominal pain-POA Per last GI Note Pancreatic pseudocyst, likely cause of duodenal inflammation and fluid collection   CT ABD PELV W CONT    1. Persistent fluid collection adjacent to the second portion of the duodenum  which is slightly increased in size although not significantly. 2.  Persistent adjacent wall thickening of the duodenum. 3.  Other incidental findings as described below  Elevated lipase level 512. Patient was seen by GI Dr Jaden Rincon      EGD ON 2020  Findings:   Esophagus:normal   Stomach: normal   Duodenum: Area of inflamed mucosa medial wall, second portion of   duodenum about 3 by 5 cm. Some of mucosa heaped up, but all soft. Multiple biopsies obtained. Never saw Michael Carota. No visible ulcer   Therapies:  biopsy of duodenal second portion   Recommendations:   -Await pathology. , -Next step most likely will be EUS of pancreas     -allow  Full liquid   -IV PPI  -IV analgesia  -IV fluids, IV abx   GI will discsuss other colluagues to decide on EUS , aspirate has hgh risk of seeding   -ED MD spoke with on-call surgeon who recommended GI consultation for possible ERCP and EUS and no acute surgical intervention needed  -We will closely monitor CBC CMP   Pt reevaluated by Dr Ron Patel who wants to repeat CT abdomen  · Get repeat CT in AM 20 to assess for worsening- if looks worse, will need bowel rest, J-tube feeding, pancreatic enzyme supplements, narcotic analgesia, and LT possible Whipple surgery.   If no worsening by CT, would consider continued PO         History of ETOH abuse   Tobacco abuse   · Denies ETOH since 2019  · Current every day smoker   · Patient educated on the importance of smoking cessation and the health benefits associated with quitting.    · Nicotine patch ordered         Code Status: Full  Surrogate Decision Maker:  Jen Figueroa  Brother [phone]    [phone]             DVT Prophylaxis: Hold anticoagulation for possible procedure in a.m. GI Prophylaxis: not indicated     Baseline: Independent    18.5 - 24.9 Normal weight / Body mass index is 18.94 kg/m². Code status: Full     Subjective:     Chief Complaint / Reason for Physician Visit  FU abdominal pain . Less pain today. Discussed with RN events overnight. Review of Systems:  Symptom Y/N Comments  Symptom Y/N Comments   Fever/Chills n   Chest Pain n    Poor Appetite    Edema     Cough n   Abdominal Pain y    Sputum    Joint Pain     SOB/GARZA n   Pruritis/Rash     Nausea/vomit n   Tolerating PT/OT     Diarrhea    Tolerating Diet     Constipation    Other       Could NOT obtain due to:      Objective:     VITALS:   Last 24hrs VS reviewed since prior progress note. Most recent are:  Patient Vitals for the past 24 hrs:   Temp Pulse Resp BP SpO2   09/03/20 1205 97.9 °F (36.6 °C) (!) 59 18 145/63 100 %   09/03/20 1132  (!) 59  145/63    09/03/20 0754 97.6 °F (36.4 °C) 62 18 170/80 98 %   09/03/20 0332 97.7 °F (36.5 °C) 68 16 152/69 100 %   09/02/20 2327 97.8 °F (36.6 °C) 66 16 121/53 98 %   09/02/20 2004 97.3 °F (36.3 °C) 65 16 149/71 100 %   09/02/20 1516 98 °F (36.7 °C) 65 18 148/65 100 %       Intake/Output Summary (Last 24 hours) at 9/3/2020 1456  Last data filed at 9/3/2020 0700  Gross per 24 hour   Intake 4115 ml   Output    Net 4115 ml        PHYSICAL EXAM:  General: WD, WN. Alert, cooperative, no acute distress    EENT:  EOMI. Anicteric sclerae. MMM  Resp:  CTA bilaterally, no wheezing or rales. No accessory muscle use  CV:  Regular  rhythm,  No edema  GI:  Soft, Non distended, Non tender.  +Bowel sounds  Neurologic:  Alert and oriented X 3, normal speech,   Psych:  fair insight. Not anxious nor agitated  Skin:  No rashes.   No jaundice    Reviewed most current lab test results and cultures  YES  Reviewed most current radiology test results   YES  Review and summation of old records today    NO  Reviewed patient's current orders and MAR    YES  PMH/SH reviewed - no change compared to H&P  ________________________________________________________________________  Care Plan discussed with:    Comments   Patient x    Family      RN x    Care Manager     Consultant                        Multidiciplinary team rounds were held today with , nursing, pharmacist and clinical coordinator. Patient's plan of care was discussed; medications were reviewed and discharge planning was addressed. ________________________________________________________________________  Total NON critical care TIME:  35 Minutes    Total CRITICAL CARE TIME Spent:   Minutes non procedure based      Comments   >50% of visit spent in counseling and coordination of care     ________________________________________________________________________  Maggie Kohler MD     Procedures: see electronic medical records for all procedures/Xrays and details which were not copied into this note but were reviewed prior to creation of Plan. LABS:  I reviewed today's most current labs and imaging studies.   Pertinent labs include:  Recent Labs     09/03/20 0257 09/01/20 0335 08/31/20 2112   WBC 12.9* 18.1* 18.6*   HGB 12.8 15.1 16.1   HCT 41.0 46.8 49.1    359 385     Recent Labs     09/01/20 0335 08/31/20 2112    137   K 4.0 3.8    101   CO2 28 31   * 102*   BUN 6 8   CREA 0.86 1.08   CA 8.9 9.7   ALB 3.4* 3.7   TBILI 0.4 0.4   ALT 28 34   INR 1.0  --        Signed: Maggie Kohler MD

## 2020-09-03 NOTE — PROGRESS NOTES
Hospitalist Progress Note    NAME: Arsenio Killian   :  1960   MRN:  [id number]       Assessment / Plan:  Abdominal pain-POA Per last GI Note Pancreatic pseudocyst, likely cause of duodenal inflammation and fluid collection   CT ABD PELV W CONT    1. Persistent fluid collection adjacent to the second portion of the duodenum  which is slightly increased in size although not significantly. 2.  Persistent adjacent wall thickening of the duodenum. 3.  Other incidental findings as described below  Elevated lipase level 512. Patient was seen by GI Dr Lopez Dryden      EGD ON 2020  Findings:   Esophagus:normal   Stomach: normal   Duodenum: Area of inflamed mucosa medial wall, second portion of   duodenum about 3 by 5 cm. Some of mucosa heaped up, but all soft. Multiple biopsies obtained. Never saw James Munguia. No visible ulcer   Therapies:  biopsy of duodenal second portion   Recommendations:   -Await pathology. , -Next step most likely will be EUS of pancreas     -allow  Full liquid   -IV PPI  -IV analgesia  -IV fluids, IV abx   GI will discsuss other colluagues to decide on EUS , aspirate has hgh risk of seeding   -ED MD spoke with on-call surgeon who recommended GI consultation for possible ERCP and EUS and no acute surgical intervention needed  -We will closely monitor CBC CMP               History of ETOH abuse   Tobacco abuse   · Denies ETOH since 2019  · Current every day smoker   · Patient educated on the importance of smoking cessation and the health benefits associated with quitting.    · Nicotine patch ordered         Code Status: Full  Surrogate Decision Maker:  Lilibeth Key  [phone]             DVT Prophylaxis: Hold anticoagulation for possible procedure in a.m. GI Prophylaxis: not indicated     Baseline: Independent    18.5 - 24.9 Normal weight / Body mass index is 18.94 kg/m².     Code status: Full     Subjective:     Chief Complaint / Reason for Physician Visit  FU abdominal pain . spke in [place]ic through blue phone  with patient , reported pain on his abdomen and is frustrated that he is not getting surgery , he reported that this issue is recurrent. Discussed with RN events overnight. Review of Systems:  Symptom Y/N Comments  Symptom Y/N Comments   Fever/Chills n   Chest Pain n    Poor Appetite    Edema     Cough n   Abdominal Pain y    Sputum    Joint Pain     SOB/GARZA n   Pruritis/Rash     Nausea/vomit n   Tolerating PT/OT     Diarrhea    Tolerating Diet     Constipation    Other       Could NOT obtain due to:      Objective:     VITALS:   Last 24hrs VS reviewed since prior progress note. Most recent are:  Patient Vitals for the past 24 hrs:   Temp Pulse Resp BP SpO2   09/03/20 0754 97.6 °F (36.4 °C) 62 18 170/80 98 %   09/03/20 0332 97.7 °F (36.5 °C) 68 16 152/69 100 %   09/02/20 2327 97.8 °F (36.6 °C) 66 16 121/53 98 %   09/02/20 2004 97.3 °F (36.3 °C) 65 16 149/71 100 %   09/02/20 1516 98 °F (36.7 °C) 65 18 148/65 100 %   09/02/20 1145 97.9 °F (36.6 °C) 68 18 152/75 100 %   09/02/20 1106 98.3 °F (36.8 °C) 70 18 155/65 100 %       Intake/Output Summary (Last 24 hours) at 9/3/2020 1012  Last data filed at 9/3/2020 0700  Gross per 24 hour   Intake 4115 ml   Output    Net 4115 ml        PHYSICAL EXAM:  General: WD, WN. Alert, cooperative, no acute distress    EENT:  EOMI. Anicteric sclerae. MMM  Resp:  CTA bilaterally, no wheezing or rales. No accessory muscle use  CV:  Regular  rhythm,  No edema  GI:  Soft, Non distended, Non tender.  +Bowel sounds  Neurologic:  Alert and oriented X 3, normal speech,   Psych:  fair insight. Not anxious nor agitated  Skin:  No rashes.   No jaundice    Reviewed most current lab test results and cultures  YES  Reviewed most current radiology test results   YES  Review and summation of old records today    NO  Reviewed patient's current orders and MAR    YES  PMH/SH reviewed - no change compared to H&P  ________________________________________________________________________  Care Plan discussed with:    Comments   Patient x    Family      RN x    Care Manager     Consultant                        Multidiciplinary team rounds were held today with , nursing, pharmacist and clinical coordinator. Patient's plan of care was discussed; medications were reviewed and discharge planning was addressed. ________________________________________________________________________  Total NON critical care TIME:  35 Minutes    Total CRITICAL CARE TIME Spent:   Minutes non procedure based      Comments   >50% of visit spent in counseling and coordination of care     ________________________________________________________________________  Nitesh Christie MD     Procedures: see electronic medical records for all procedures/Xrays and details which were not copied into this note but were reviewed prior to creation of Plan. LABS:  I reviewed today's most current labs and imaging studies.   Pertinent labs include:  Recent Labs     09/03/20 0257 09/01/20 0335 08/31/20 2112   WBC 12.9* 18.1* 18.6*   HGB 12.8 15.1 16.1   HCT 41.0 46.8 49.1    359 385     Recent Labs     09/01/20 0335 08/31/20 2112    137   K 4.0 3.8    101   CO2 28 31   * 102*   BUN 6 8   CREA 0.86 1.08   CA 8.9 9.7   ALB 3.4* 3.7   TBILI 0.4 0.4   ALT 28 34   INR 1.0  --        Signed: Nitesh Christie MD

## 2020-09-04 ENCOUNTER — APPOINTMENT (OUTPATIENT)
Dept: CT IMAGING | Age: 60
DRG: 282 | End: 2020-09-04
Attending: INTERNAL MEDICINE
Payer: MEDICAID

## 2020-09-04 LAB
ANION GAP SERPL CALC-SCNC: 5 MMOL/L (ref 5–15)
BASOPHILS # BLD: 0.1 K/UL (ref 0–0.1)
BASOPHILS NFR BLD: 1 % (ref 0–1)
BUN SERPL-MCNC: 11 MG/DL (ref 6–20)
BUN/CREAT SERPL: 10 (ref 12–20)
CALCIUM SERPL-MCNC: 8.7 MG/DL (ref 8.5–10.1)
CHLORIDE SERPL-SCNC: 105 MMOL/L (ref 97–108)
CO2 SERPL-SCNC: 29 MMOL/L (ref 21–32)
CREAT SERPL-MCNC: 1.11 MG/DL (ref 0.7–1.3)
DIFFERENTIAL METHOD BLD: ABNORMAL
EOSINOPHIL # BLD: 0.4 K/UL (ref 0–0.4)
EOSINOPHIL NFR BLD: 3 % (ref 0–7)
ERYTHROCYTE [DISTWIDTH] IN BLOOD BY AUTOMATED COUNT: 14.8 % (ref 11.5–14.5)
GLUCOSE SERPL-MCNC: 128 MG/DL (ref 65–100)
HCT VFR BLD AUTO: 41.1 % (ref 36.6–50.3)
HGB BLD-MCNC: 13.2 G/DL (ref 12.1–17)
IMM GRANULOCYTES # BLD AUTO: 0 K/UL (ref 0–0.04)
IMM GRANULOCYTES NFR BLD AUTO: 0 % (ref 0–0.5)
LYMPHOCYTES # BLD: 4.7 K/UL (ref 0.8–3.5)
LYMPHOCYTES NFR BLD: 42 % (ref 12–49)
MCH RBC QN AUTO: 27.6 PG (ref 26–34)
MCHC RBC AUTO-ENTMCNC: 32.1 G/DL (ref 30–36.5)
MCV RBC AUTO: 86 FL (ref 80–99)
MONOCYTES # BLD: 0.8 K/UL (ref 0–1)
MONOCYTES NFR BLD: 8 % (ref 5–13)
NEUTS SEG # BLD: 5.1 K/UL (ref 1.8–8)
NEUTS SEG NFR BLD: 46 % (ref 32–75)
NRBC # BLD: 0 K/UL (ref 0–0.01)
NRBC BLD-RTO: 0 PER 100 WBC
PLATELET # BLD AUTO: 333 K/UL (ref 150–400)
PMV BLD AUTO: 9.4 FL (ref 8.9–12.9)
POTASSIUM SERPL-SCNC: 3.5 MMOL/L (ref 3.5–5.1)
RBC # BLD AUTO: 4.78 M/UL (ref 4.1–5.7)
SODIUM SERPL-SCNC: 139 MMOL/L (ref 136–145)
WBC # BLD AUTO: 11 K/UL (ref 4.1–11.1)

## 2020-09-04 PROCEDURE — 74177 CT ABD & PELVIS W/CONTRAST: CPT

## 2020-09-04 PROCEDURE — C9113 INJ PANTOPRAZOLE SODIUM, VIA: HCPCS | Performed by: HOSPITALIST

## 2020-09-04 PROCEDURE — 80048 BASIC METABOLIC PNL TOTAL CA: CPT

## 2020-09-04 PROCEDURE — 85025 COMPLETE CBC W/AUTO DIFF WBC: CPT

## 2020-09-04 PROCEDURE — 74011000636 HC RX REV CODE- 636: Performed by: INTERNAL MEDICINE

## 2020-09-04 PROCEDURE — 74011636320 HC RX REV CODE- 636/320: Performed by: INTERNAL MEDICINE

## 2020-09-04 PROCEDURE — 74011000258 HC RX REV CODE- 258: Performed by: HOSPITALIST

## 2020-09-04 PROCEDURE — 74011250637 HC RX REV CODE- 250/637: Performed by: INTERNAL MEDICINE

## 2020-09-04 PROCEDURE — 65270000029 HC RM PRIVATE

## 2020-09-04 PROCEDURE — 74011000250 HC RX REV CODE- 250: Performed by: HOSPITALIST

## 2020-09-04 PROCEDURE — 74011250636 HC RX REV CODE- 250/636: Performed by: HOSPITALIST

## 2020-09-04 PROCEDURE — 99232 SBSQ HOSP IP/OBS MODERATE 35: CPT | Performed by: SURGERY

## 2020-09-04 PROCEDURE — 36415 COLL VENOUS BLD VENIPUNCTURE: CPT

## 2020-09-04 RX ORDER — SODIUM CHLORIDE 0.9 % (FLUSH) 0.9 %
10 SYRINGE (ML) INJECTION
Status: ACTIVE | OUTPATIENT
Start: 2020-09-04 | End: 2020-09-04

## 2020-09-04 RX ADMIN — MORPHINE SULFATE 4 MG: 2 INJECTION, SOLUTION INTRAMUSCULAR; INTRAVENOUS at 05:17

## 2020-09-04 RX ADMIN — MORPHINE SULFATE 4 MG: 2 INJECTION, SOLUTION INTRAMUSCULAR; INTRAVENOUS at 11:19

## 2020-09-04 RX ADMIN — PIPERACILLIN AND TAZOBACTAM 3.38 G: 3; .375 INJECTION, POWDER, LYOPHILIZED, FOR SOLUTION INTRAVENOUS at 21:11

## 2020-09-04 RX ADMIN — MORPHINE SULFATE 4 MG: 2 INJECTION, SOLUTION INTRAMUSCULAR; INTRAVENOUS at 20:40

## 2020-09-04 RX ADMIN — PIPERACILLIN AND TAZOBACTAM 3.38 G: 3; .375 INJECTION, POWDER, LYOPHILIZED, FOR SOLUTION INTRAVENOUS at 13:47

## 2020-09-04 RX ADMIN — PIPERACILLIN AND TAZOBACTAM 3.38 G: 3; .375 INJECTION, POWDER, LYOPHILIZED, FOR SOLUTION INTRAVENOUS at 05:16

## 2020-09-04 RX ADMIN — Medication 10 ML: at 21:59

## 2020-09-04 RX ADMIN — SODIUM CHLORIDE 40 MG: 9 INJECTION, SOLUTION INTRAMUSCULAR; INTRAVENOUS; SUBCUTANEOUS at 08:34

## 2020-09-04 RX ADMIN — Medication 10 ML: at 13:47

## 2020-09-04 RX ADMIN — IOPAMIDOL 100 ML: 755 INJECTION, SOLUTION INTRAVENOUS at 15:07

## 2020-09-04 RX ADMIN — SODIUM CHLORIDE 100 ML/HR: 900 INJECTION, SOLUTION INTRAVENOUS at 01:49

## 2020-09-04 RX ADMIN — SODIUM CHLORIDE 40 MG: 9 INJECTION, SOLUTION INTRAMUSCULAR; INTRAVENOUS; SUBCUTANEOUS at 20:40

## 2020-09-04 RX ADMIN — MORPHINE SULFATE 4 MG: 2 INJECTION, SOLUTION INTRAMUSCULAR; INTRAVENOUS at 17:29

## 2020-09-04 RX ADMIN — Medication 10 ML: at 07:20

## 2020-09-04 RX ADMIN — MORPHINE SULFATE 4 MG: 2 INJECTION, SOLUTION INTRAMUSCULAR; INTRAVENOUS at 01:46

## 2020-09-04 RX ADMIN — MORPHINE SULFATE 4 MG: 2 INJECTION, SOLUTION INTRAMUSCULAR; INTRAVENOUS at 13:53

## 2020-09-04 RX ADMIN — AMLODIPINE BESYLATE 5 MG: 5 TABLET ORAL at 08:34

## 2020-09-04 RX ADMIN — MORPHINE SULFATE 4 MG: 2 INJECTION, SOLUTION INTRAMUSCULAR; INTRAVENOUS at 23:56

## 2020-09-04 RX ADMIN — SODIUM CHLORIDE 100 ML/HR: 900 INJECTION, SOLUTION INTRAVENOUS at 20:41

## 2020-09-04 RX ADMIN — MORPHINE SULFATE 4 MG: 2 INJECTION, SOLUTION INTRAMUSCULAR; INTRAVENOUS at 08:34

## 2020-09-04 RX ADMIN — DIATRIZOATE MEGLUMINE AND DIATRIZOATE SODIUM 30 ML: 660; 100 LIQUID ORAL; RECTAL at 11:16

## 2020-09-04 NOTE — PROGRESS NOTES
Problem: Falls - Risk of  Goal: *Absence of Falls  Description: Document Power [place] Fall Risk and appropriate interventions in the flowsheet.   Outcome: Progressing Towards Goal  Note: Fall Risk Interventions:            Medication Interventions: Assess postural VS orthostatic hypotension, Evaluate medications/consider consulting pharmacy, Patient to call before getting OOB, Utilize gait belt for transfers/ambulation, Teach patient to arise slowly         History of Falls Interventions: Consult care management for discharge planning, Door open when patient unattended, Evaluate medications/consider consulting pharmacy, Room close to nurse's station, Utilize gait belt for transfer/ambulation, Assess for delayed presentation/identification of injury for 48 hrs (comment for end date), Vital signs minimum Q4HRs X 24 hrs (comment for end date)         Problem: Patient Education: Go to Patient Education Activity  Goal: Patient/Family Education  Outcome: Progressing Towards Goal

## 2020-09-04 NOTE — PROGRESS NOTES
Admit Date: 2020    POD * No surgery found *    Procedure:  * No surgery found *      Assessment:   Active Problems:    Duodenal abscess (2020)      Abdominal pain (2020)      1. Duodenal/peripancreatic collection ? ?hematoma, tumor, ? infection  2. CT 2020 with much improved. Only a small amount of residual inflammation  3. Nausea and vomiting improved  4. Still with significant pain  5.  services were utilized (blue phone)  6. ETOH abuse      Plan/Recommendations/Medical Decision Makin. Continue IV Abx  2. I think he should be able to go home in the next few days  3.  low diet as tolerated    Subjective:     Patient has complaints of pain. Objective:     Blood pressure 142/70, pulse 62, temperature 98 °F (36.7 °C), resp. rate 18, height 5' 11\" (1.803 m), weight 69 kg (152 lb 1.9 oz), SpO2 100 %. Temp (24hrs), Av.2 °F (36.8 °C), Min:97.6 °F (36.4 °C), Max:98.6 °F (37 °C)      Physical Exam:  LUNG: clear to auscultation bilaterally, HEART: regular rate and rhythm, S1, S2 normal, no murmur, click, rub or gallop, ABDOMEN: soft, non-distended tender in epigastrium/RUQ without rebound/guarding. Bowel sounds normal. No masses,  no organomegaly, EXTREMITIES:  extremities normal, atraumatic, no cyanosis or edema    Labs:   Recent Results (from the past 48 hour(s))   CBC WITH AUTOMATED DIFF    Collection Time: 20  2:57 AM   Result Value Ref Range    WBC 12.9 (H) 4.1 - 11.1 K/uL    RBC 4.69 4.10 - 5.70 M/uL    HGB 12.8 12.1 - 17.0 g/dL    HCT 41.0 36.6 - 50.3 %    MCV 87.4 80.0 - 99.0 FL    MCH 27.3 26.0 - 34.0 PG    MCHC 31.2 30.0 - 36.5 g/dL    RDW 15.1 (H) 11.5 - 14.5 %    PLATELET 944 189 - 774 K/uL    MPV 9.5 8.9 - 12.9 FL    NRBC 0.0 0  WBC    ABSOLUTE NRBC 0.00 0.00 - 0.01 K/uL    NEUTROPHILS 49 32 - 75 %    LYMPHOCYTES 40 12 - 49 %    MONOCYTES 7 5 - 13 %    EOSINOPHILS 3 0 - 7 %    BASOPHILS 1 0 - 1 %    IMMATURE GRANULOCYTES 0 0.0 - 0.5 %    ABS. NEUTROPHILS 6.4 1.8 - 8.0 K/UL    ABS. LYMPHOCYTES 5.1 (H) 0.8 - 3.5 K/UL    ABS. MONOCYTES 0.9 0.0 - 1.0 K/UL    ABS. EOSINOPHILS 0.4 0.0 - 0.4 K/UL    ABS. BASOPHILS 0.1 0.0 - 0.1 K/UL    ABS. IMM. GRANS. 0.0 0.00 - 0.04 K/UL    DF AUTOMATED     CBC WITH AUTOMATED DIFF    Collection Time: 09/04/20  3:05 AM   Result Value Ref Range    WBC 11.0 4.1 - 11.1 K/uL    RBC 4.78 4.10 - 5.70 M/uL    HGB 13.2 12.1 - 17.0 g/dL    HCT 41.1 36.6 - 50.3 %    MCV 86.0 80.0 - 99.0 FL    MCH 27.6 26.0 - 34.0 PG    MCHC 32.1 30.0 - 36.5 g/dL    RDW 14.8 (H) 11.5 - 14.5 %    PLATELET 718 422 - 941 K/uL    MPV 9.4 8.9 - 12.9 FL    NRBC 0.0 0  WBC    ABSOLUTE NRBC 0.00 0.00 - 0.01 K/uL    NEUTROPHILS 46 32 - 75 %    LYMPHOCYTES 42 12 - 49 %    MONOCYTES 8 5 - 13 %    EOSINOPHILS 3 0 - 7 %    BASOPHILS 1 0 - 1 %    IMMATURE GRANULOCYTES 0 0.0 - 0.5 %    ABS. NEUTROPHILS 5.1 1.8 - 8.0 K/UL    ABS. LYMPHOCYTES 4.7 (H) 0.8 - 3.5 K/UL    ABS. MONOCYTES 0.8 0.0 - 1.0 K/UL    ABS. EOSINOPHILS 0.4 0.0 - 0.4 K/UL    ABS. BASOPHILS 0.1 0.0 - 0.1 K/UL    ABS. IMM.  GRANS. 0.0 0.00 - 0.04 K/UL    DF AUTOMATED     METABOLIC PANEL, BASIC    Collection Time: 09/04/20  3:05 AM   Result Value Ref Range    Sodium 139 136 - 145 mmol/L    Potassium 3.5 3.5 - 5.1 mmol/L    Chloride 105 97 - 108 mmol/L    CO2 29 21 - 32 mmol/L    Anion gap 5 5 - 15 mmol/L    Glucose 128 (H) 65 - 100 mg/dL    BUN 11 6 - 20 MG/DL    Creatinine 1.11 0.70 - 1.30 MG/DL    BUN/Creatinine ratio 10 (L) 12 - 20      GFR est AA >60 >60 ml/min/1.73m2    GFR est non-AA >60 >60 ml/min/1.73m2    Calcium 8.7 8.5 - 10.1 MG/DL       Data Review images and reports reviewed

## 2020-09-04 NOTE — PROGRESS NOTES
Hospitalist Progress Note    NAME: William Biswas   :  1960   MRN:  [id number]       Assessment / Plan:  Abdominal pain-POA Per last GI Note Pancreatic pseudocyst, likely cause of duodenal inflammation and fluid collection   CT ABD PELV W CONT    1. Persistent fluid collection adjacent to the second portion of the duodenum  which is slightly increased in size although not significantly. 2.  Persistent adjacent wall thickening of the duodenum. 3.  Other incidental findings as described below  Elevated lipase level 512. Patient was seen by GI Dr Humera Joshua      EGD ON 2020  Findings:   Esophagus:normal   Stomach: normal   Duodenum: Area of inflamed mucosa medial wall, second portion of   duodenum about 3 by 5 cm. Some of mucosa heaped up, but all soft. Multiple biopsies obtained. Never saw Lee [facility] Crouch. No visible ulcer   Therapies:  biopsy of duodenal second portion   Recommendations:   -Await pathology. , -Next step most likely will be EUS of pancreas  Tolerating diet   -IV PPI  -IV analgesia  -IV fluids, IV abx   GI will discsuss other colluagues to decide on EUS , aspirate has hgh risk of seeding   -ED MD spoke with on-call surgeon who recommended GI consultation for possible ERCP and EUS and no acute surgical intervention needed  -We will closely monitor CBC CMP   Pt reevaluated by Dr Yenifer Thomas who wants to repeat CT abdomen  · Get repeat CT in AM 20 to assess for worsening- if looks worse, will need bowel rest, J-tube feeding, pancreatic enzyme supplements, narcotic analgesia, and LT possible Whipple surgery. If no worsening by CT, would consider continued PO   repeat CT abdomen showed Improvement of pancreatic head region swelling and fluid.  No new finding.    no plan for surgery or EUS       History of ETOH abuse   Tobacco abuse   · Denies ETOH since 2019  · Current every day smoker   · Patient educated on the importance of smoking cessation and the health benefits associated with quitting.    · Nicotine patch ordered         Code Status: Full  Surrogate Decision Maker:  Cherylene Joe  [phone]             DVT Prophylaxis: Hold anticoagulation for possible procedure in a.m. GI Prophylaxis: not indicated     Baseline: Independent    18.5 - 24.9 Normal weight / Body mass index is 21.22 kg/m². Code status: Full     Subjective:     Chief Complaint / Reason for Physician Visit  FU abdominal pain . tolerating diet . Discussed with RN events overnight. Review of Systems:  Symptom Y/N Comments  Symptom Y/N Comments   Fever/Chills n   Chest Pain n    Poor Appetite    Edema     Cough n   Abdominal Pain y    Sputum    Joint Pain     SOB/GARZA n   Pruritis/Rash     Nausea/vomit n   Tolerating PT/OT     Diarrhea    Tolerating Diet     Constipation    Other       Could NOT obtain due to:      Objective:     VITALS:   Last 24hrs VS reviewed since prior progress note. Most recent are:  Patient Vitals for the past 24 hrs:   Temp Pulse Resp BP SpO2   09/04/20 0306 98.6 °F (37 °C) 65 18 144/64 99 %   09/04/20 0145 97.6 °F (36.4 °C) 69 20 133/69 98 %   09/03/20 2054 98.5 °F (36.9 °C) 63 18 145/73 99 %   09/03/20 1643 98.2 °F (36.8 °C) 71 18 157/83 100 %   09/03/20 1542 98 °F (36.7 °C) 68 18 129/63 100 %   09/03/20 1205 97.9 °F (36.6 °C) (!) 59 18 145/63 100 %   09/03/20 1132  (!) 59  145/63        Intake/Output Summary (Last 24 hours) at 9/4/2020 0816  Last data filed at 9/4/2020 0730  Gross per 24 hour   Intake 1945 ml   Output    Net 1945 ml        PHYSICAL EXAM:  General: WD, WN. Alert, cooperative, no acute distress    EENT:  EOMI. Anicteric sclerae. MMM  Resp:  CTA bilaterally, no wheezing or rales. No accessory muscle use  CV:  Regular  rhythm,  No edema  GI:  Soft, Non distended, Non tender.  +Bowel sounds  Neurologic:  Alert and oriented X 3, normal speech,   Psych:  fair insight. Not anxious nor agitated  Skin:  No rashes.   No jaundice    Reviewed most current lab test results and cultures  YES  Reviewed most current radiology test results   YES  Review and summation of old records today    NO  Reviewed patient's current orders and MAR    YES  PMH/SH reviewed - no change compared to H&P  ________________________________________________________________________  Care Plan discussed with:    Comments   Patient x    Family      RN x    Care Manager     Consultant                        Multidiciplinary team rounds were held today with , nursing, pharmacist and clinical coordinator. Patient's plan of care was discussed; medications were reviewed and discharge planning was addressed. ________________________________________________________________________  Total NON critical care TIME:  35 Minutes    Total CRITICAL CARE TIME Spent:   Minutes non procedure based      Comments   >50% of visit spent in counseling and coordination of care     ________________________________________________________________________  Kareem Freed MD     Procedures: see electronic medical records for all procedures/Xrays and details which were not copied into this note but were reviewed prior to creation of Plan. LABS:  I reviewed today's most current labs and imaging studies.   Pertinent labs include:  Recent Labs     09/04/20  0305 09/03/20  0257   WBC 11.0 12.9*   HGB 13.2 12.8   HCT 41.1 41.0    326     Recent Labs     09/04/20  0305      K 3.5      CO2 29   *   BUN 11   CREA 1.11   CA 8.7       Signed: Kareem Freed MD

## 2020-09-04 NOTE — PROGRESS NOTES
GI Progress Note (for Nataly Livers)  Jeevan Guillen :1960 UEX:[id number]   ATTG: Emil Campos MD  Primary GI: Clark Murillo MD PCP: Yanci Enriquez MD  Date/Time:  2020 1650   Assessment:   · Abnl GI CT with periduodenal FC on medial wall of d1-d2- CT today appears to show improvement. I suspect this is sequellae of prior pancreatitis or groove pancreatitis in 2019, but he states he has not had any EtOH since 2019   He presented in beginning of 2020 with elevated lipase. Prior aspirate fluid described as c/w hematoma per radiologist, but fluid only sent for cx (all negative)  · Duodenitis- noted on bx with no obvious ulcer noted  · RUQ/Epig abd pain- no evidence of worsening on pancreatitis on CT and LFTs normal.  S/p Jennifer in 3/2020. The level of pain is out of proportion to the findings  · Leukocytosis     Plan:   · Attempt conversion to PO narcotic analgesa, but not Oxycodone/Acetaminophen combo as he states he did not tolerate it as OP  · Continue abx for 14d total  · EUS will not be useful other than looking for changes of acute and chronic pancreatitis which we know he has had or has. The fluid should not be aspirated as drainage will be incomplete given septations noted in the [facility] and will put pt at high risk for seeding/infecting the [facility]  · I had extended phone conversation with his daughter today in presence of pt and his wife   Will see on request only over weekend. He can f/u with   Subjective:   Discussed with RN events overnight. C/o persistent Epig and RUQ pain but not increased with PO. Notes narcotics wear off quickly when converts to oral form and did not tolerate oxycodone/acetaminophen as OP. States he does not have increased pain when he eats.     Complaint Y/N Description   Abdominal Pain y RUQ>Epig   Hematemesis n    Hematochezia n    Melena n    Constipation n    Diarrhea n    Dyspepsia n    Dysphagia n    Jaundiced n    Nausea/vomiting n      Review of Systems:  Symptom Y/N Comments  Symptom Y/N Comments   Fever/Chills n   Chest Pain n    Cough n   Headaches n    Sputum n   Joint Pain n    SOB/GARZA n   Pruritis/Rash n    Tolerating Diet y Dental soft  Other       Could NOT obtain due to:      Objective:   VITALS:   Last 24hrs VS reviewed since prior progress note. Most recent are:  Visit Vitals  /70   Pulse 62   Temp 98 °F (36.7 °C)   Resp 18   Ht 5' 11\" (1.803 m)   Wt 69 kg (152 lb 1.9 oz)   SpO2 100%   BMI 21.22 kg/m²       Intake/Output Summary (Last 24 hours) at 9/4/2020 1650  Last data filed at 9/4/2020 1608  Gross per 24 hour   Intake 2045 ml   Output    Net 2045 ml     PHYSICAL EXAM:  General: WD, WN. Alert, cooperative, no acute distress    HEENT: NC, Atraumatic. PERRL. Anicteric sclerae. Poor dentition. Lungs:  CTA Bilaterally. No Wheezing/Rhonchi/Rales. Heart:  Regular  rhythm,  No murmur/Rub/Gallops  Abdomen: Soft, Non distended, +RUQ/Epig tender.  +BS  Extremities: No c/c/e  Neurologic:  CN 2-12 gi, A/O X 3. No acute neurological distress   Psych:   Good insight. Not anxious nor agitated. Lab and Radiology Data Reviewed: (see below)  CT Abd/Pelvis with PO/IV contrast 9/4/20  COMPARISON: 8/31/2020. FINDINGS:  Soft tissue swelling and fluid at the pancreaticoduodenal groove has improved  with thin crescent of hypodensity remaining. There is no acute pancreatic  inflammation. CBD and pancreatic duct remain mildly prominent but are stable. There is no overt dilation of intrahepatic bile ducts. There is no inflammation, ascites, pneumoperitoneum or significant adenopathy. Liver shows no significant finding. Spleen is unremarkable. Adrenal glands are  normal in size. Kidneys show no mass or hydronephrosis. Aorta is calcified  without aneurysm. The appendix is normal. The bladder is unremarkable. The distal ureters are not  dilated. There is no apparent pelvic mass. IMPRESSION: Improvement of pancreatic head region swelling and fluid.  No new  Finding. Medications Reviewed: (see below)  PMH/SH reviewed - no change compared to H&P  ________________________________________________________________________  Total time spent with patient: 15 minutes ________________________________________________________________________  Care Plan discussed with:  Patient y   Family     RN y              Consultant:  Rosalina Hill MD     Procedures: see electronic medical records for all procedures/Xrays and details which were not copied into this note but were reviewed prior to creation of Plan. LABS:  Recent Labs     09/04/20  0305 09/03/20  0257   WBC 11.0 12.9*   HGB 13.2 12.8   HCT 41.1 41.0    326     Recent Labs     09/04/20  0305      K 3.5      CO2 29   BUN 11   CREA 1.11   *   CA 8.7     No results for input(s): AP, TBIL, TP, ALB, GLOB, GGT, AML, LPSE in the last 72 hours. No lab exists for component: SGOT, GPT, AMYP, HLPSE  No results for input(s): INR, PTP, APTT, INREXT, INREXT in the last 72 hours. No results for input(s): FE, TIBC, PSAT, FERR in the last 72 hours. No results found for: FOL, RBCF  No results for input(s): PH, PCO2, PO2 in the last 72 hours. No results for input(s): CPK, CKMB in the last 72 hours.     No lab exists for component: TROPONINI  Lab Results   Component Value Date/Time    Color YELLOW/STRAW 08/31/2020 09:12 PM    Appearance CLEAR 08/31/2020 09:12 PM    Specific gravity 1.015 08/31/2020 09:12 PM    Specific gravity 1.019 08/27/2020 01:34 PM    pH (UA) 7.5 08/31/2020 09:12 PM    Protein Negative 08/31/2020 09:12 PM    Glucose Negative 08/31/2020 09:12 PM    Ketone Negative 08/31/2020 09:12 PM    Bilirubin Negative 08/31/2020 09:12 PM    Urobilinogen 0.2 08/31/2020 09:12 PM    Nitrites Negative 08/31/2020 09:12 PM    Leukocyte Esterase Negative 08/31/2020 09:12 PM    Epithelial cells FEW 08/31/2020 09:12 PM    Bacteria Negative 08/31/2020 09:12 PM    WBC 0-4 08/31/2020 09:12 PM RBC 0-5 08/31/2020 09:12 PM       MEDICATIONS:  Current Facility-Administered Medications   Medication Dose Route Frequency    sodium chloride (NS) flush 10 mL  10 mL IntraVENous RAD ONCE    amLODIPine (NORVASC) tablet 5 mg  5 mg Oral DAILY    nicotine (NICODERM CQ) 14 mg/24 hr patch 1 Patch  1 Patch TransDERmal DAILY PRN    pantoprazole (PROTONIX) 40 mg in 0.9% sodium chloride 10 mL injection  40 mg IntraVENous Q12H    sodium chloride (NS) flush 5-40 mL  5-40 mL IntraVENous Q8H    sodium chloride (NS) flush 5-40 mL  5-40 mL IntraVENous PRN    acetaminophen (TYLENOL) tablet 650 mg  650 mg Oral Q6H PRN    Or    acetaminophen (TYLENOL) suppository 650 mg  650 mg Rectal Q6H PRN    polyethylene glycol (MIRALAX) packet 17 g  17 g Oral DAILY PRN    promethazine (PHENERGAN) tablet 12.5 mg  12.5 mg Oral Q6H PRN    Or    ondansetron (ZOFRAN) injection 4 mg  4 mg IntraVENous Q6H PRN    morphine injection 4 mg  4 mg IntraVENous Q3H PRN    0.9% sodium chloride infusion  100 mL/hr IntraVENous CONTINUOUS    piperacillin-tazobactam (ZOSYN) 3.375 g in 0.9% sodium chloride (MBP/ADV) 100 mL  3.375 g IntraVENous Q8H

## 2020-09-05 LAB
BASOPHILS # BLD: 0.1 K/UL (ref 0–0.1)
BASOPHILS NFR BLD: 1 % (ref 0–1)
DIFFERENTIAL METHOD BLD: ABNORMAL
EOSINOPHIL # BLD: 0.4 K/UL (ref 0–0.4)
EOSINOPHIL NFR BLD: 4 % (ref 0–7)
ERYTHROCYTE [DISTWIDTH] IN BLOOD BY AUTOMATED COUNT: 15.1 % (ref 11.5–14.5)
HCT VFR BLD AUTO: 43.4 % (ref 36.6–50.3)
HGB BLD-MCNC: 13.6 G/DL (ref 12.1–17)
IMM GRANULOCYTES # BLD AUTO: 0 K/UL (ref 0–0.04)
IMM GRANULOCYTES NFR BLD AUTO: 0 % (ref 0–0.5)
LYMPHOCYTES # BLD: 4.5 K/UL (ref 0.8–3.5)
LYMPHOCYTES NFR BLD: 43 % (ref 12–49)
MCH RBC QN AUTO: 27 PG (ref 26–34)
MCHC RBC AUTO-ENTMCNC: 31.3 G/DL (ref 30–36.5)
MCV RBC AUTO: 86.1 FL (ref 80–99)
MONOCYTES # BLD: 0.8 K/UL (ref 0–1)
MONOCYTES NFR BLD: 8 % (ref 5–13)
NEUTS SEG # BLD: 4.7 K/UL (ref 1.8–8)
NEUTS SEG NFR BLD: 44 % (ref 32–75)
NRBC # BLD: 0 K/UL (ref 0–0.01)
NRBC BLD-RTO: 0 PER 100 WBC
PLATELET # BLD AUTO: 342 K/UL (ref 150–400)
PMV BLD AUTO: 10.3 FL (ref 8.9–12.9)
RBC # BLD AUTO: 5.04 M/UL (ref 4.1–5.7)
WBC # BLD AUTO: 10.5 K/UL (ref 4.1–11.1)

## 2020-09-05 PROCEDURE — 74011000250 HC RX REV CODE- 250: Performed by: HOSPITALIST

## 2020-09-05 PROCEDURE — 65270000029 HC RM PRIVATE

## 2020-09-05 PROCEDURE — 99231 SBSQ HOSP IP/OBS SF/LOW 25: CPT | Performed by: SURGERY

## 2020-09-05 PROCEDURE — C9113 INJ PANTOPRAZOLE SODIUM, VIA: HCPCS | Performed by: HOSPITALIST

## 2020-09-05 PROCEDURE — 36415 COLL VENOUS BLD VENIPUNCTURE: CPT

## 2020-09-05 PROCEDURE — 74011000258 HC RX REV CODE- 258: Performed by: HOSPITALIST

## 2020-09-05 PROCEDURE — 85025 COMPLETE CBC W/AUTO DIFF WBC: CPT

## 2020-09-05 PROCEDURE — 74011250637 HC RX REV CODE- 250/637: Performed by: INTERNAL MEDICINE

## 2020-09-05 PROCEDURE — 74011250636 HC RX REV CODE- 250/636: Performed by: HOSPITALIST

## 2020-09-05 RX ORDER — MORPHINE SULFATE 15 MG/1
15 TABLET ORAL
Status: DISCONTINUED | OUTPATIENT
Start: 2020-09-05 | End: 2020-09-06

## 2020-09-05 RX ADMIN — PIPERACILLIN AND TAZOBACTAM 3.38 G: 3; .375 INJECTION, POWDER, LYOPHILIZED, FOR SOLUTION INTRAVENOUS at 21:04

## 2020-09-05 RX ADMIN — SODIUM CHLORIDE 100 ML/HR: 900 INJECTION, SOLUTION INTRAVENOUS at 18:21

## 2020-09-05 RX ADMIN — MORPHINE SULFATE 4 MG: 2 INJECTION, SOLUTION INTRAMUSCULAR; INTRAVENOUS at 06:04

## 2020-09-05 RX ADMIN — PIPERACILLIN AND TAZOBACTAM 3.38 G: 3; .375 INJECTION, POWDER, LYOPHILIZED, FOR SOLUTION INTRAVENOUS at 06:04

## 2020-09-05 RX ADMIN — Medication 10 ML: at 21:06

## 2020-09-05 RX ADMIN — Medication 10 ML: at 06:04

## 2020-09-05 RX ADMIN — MORPHINE SULFATE 15 MG: 15 TABLET ORAL at 16:58

## 2020-09-05 RX ADMIN — MORPHINE SULFATE 15 MG: 15 TABLET ORAL at 13:05

## 2020-09-05 RX ADMIN — AMLODIPINE BESYLATE 5 MG: 5 TABLET ORAL at 09:01

## 2020-09-05 RX ADMIN — MORPHINE SULFATE 4 MG: 2 INJECTION, SOLUTION INTRAMUSCULAR; INTRAVENOUS at 09:01

## 2020-09-05 RX ADMIN — MORPHINE SULFATE 4 MG: 2 INJECTION, SOLUTION INTRAMUSCULAR; INTRAVENOUS at 03:03

## 2020-09-05 RX ADMIN — SODIUM CHLORIDE 40 MG: 9 INJECTION, SOLUTION INTRAMUSCULAR; INTRAVENOUS; SUBCUTANEOUS at 21:05

## 2020-09-05 RX ADMIN — SODIUM CHLORIDE 40 MG: 9 INJECTION, SOLUTION INTRAMUSCULAR; INTRAVENOUS; SUBCUTANEOUS at 09:01

## 2020-09-05 RX ADMIN — MORPHINE SULFATE 15 MG: 15 TABLET ORAL at 21:04

## 2020-09-05 RX ADMIN — PIPERACILLIN AND TAZOBACTAM 3.38 G: 3; .375 INJECTION, POWDER, LYOPHILIZED, FOR SOLUTION INTRAVENOUS at 13:04

## 2020-09-05 NOTE — PROGRESS NOTES
Admit Date: 2020    Subjective:     Patient has complaints of pain. Objective:     Blood pressure 122/63, pulse 64, temperature 98 °F (36.7 °C), resp. rate 18, height 5' 11\" (1.803 m), weight 152 lb 1.9 oz (69 kg), SpO2 99 %. Temp (24hrs), Av.1 °F (36.7 °C), Min:98 °F (36.7 °C), Max:98.5 °F (36.9 °C)      Physical Exam:  GENERAL: alert, cooperative, no distress, appears stated age, LUNG: clear to auscultation bilaterally, HEART: regular rate and rhythm, ABDOMEN: soft, ND, tender upper abdomen without peritoneal signs, EXTREMITIES:  extremities normal, atraumatic, no cyanosis or edema    Labs:   Recent Results (from the past 24 hour(s))   CBC WITH AUTOMATED DIFF    Collection Time: 20  3:41 AM   Result Value Ref Range    WBC 10.5 4.1 - 11.1 K/uL    RBC 5.04 4.10 - 5.70 M/uL    HGB 13.6 12.1 - 17.0 g/dL    HCT 43.4 36.6 - 50.3 %    MCV 86.1 80.0 - 99.0 FL    MCH 27.0 26.0 - 34.0 PG    MCHC 31.3 30.0 - 36.5 g/dL    RDW 15.1 (H) 11.5 - 14.5 %    PLATELET 329 837 - 172 K/uL    MPV 10.3 8.9 - 12.9 FL    NRBC 0.0 0  WBC    ABSOLUTE NRBC 0.00 0.00 - 0.01 K/uL    NEUTROPHILS 44 32 - 75 %    LYMPHOCYTES 43 12 - 49 %    MONOCYTES 8 5 - 13 %    EOSINOPHILS 4 0 - 7 %    BASOPHILS 1 0 - 1 %    IMMATURE GRANULOCYTES 0 0.0 - 0.5 %    ABS. NEUTROPHILS 4.7 1.8 - 8.0 K/UL    ABS. LYMPHOCYTES 4.5 (H) 0.8 - 3.5 K/UL    ABS. MONOCYTES 0.8 0.0 - 1.0 K/UL    ABS. EOSINOPHILS 0.4 0.0 - 0.4 K/UL    ABS. BASOPHILS 0.1 0.0 - 0.1 K/UL    ABS. IMM. GRANS. 0.0 0.00 - 0.04 K/UL    DF AUTOMATED         Data Review images and reports reviewed    Assessment:     Active Problems:    Duodenal abscess (2020)      Abdominal pain (2020)        Plan/Recommendations/Medical Decision Making:     Continue present treatment   Appears to be improving. Agree, this is likely sequellae from prior pancreatitis. No evidence of neoplasm or perforation. Should not require surgical intervention.    Switch to po pain control  Should be ready for d/c home soon. F/u with GI as outpatient. Sofia Pedroza.  Olivia Sandoval MD, [facility]

## 2020-09-05 NOTE — PROGRESS NOTES
Hospitalist Progress Note    NAME: Dinora Dueñas   :  1960   MRN:  [id number]       Assessment / Plan:  Abdominal pain-POA   Pancreatic pseudocyst, not likely cause of duodenal inflammation and fluid collection   Pain liekly from prior pancreatitis  Surgery recs PO pain control which was done this AM and tolerated well. CT ABD PELV W CONT    1. Persistent fluid collection adjacent to the second portion of the duodenum  which is slightly increased in size although not significantly. 2.  Persistent adjacent wall thickening of the duodenum. 3.  Other incidental findings as described below  Elevated lipase level 512. Patient was seen by GI Dr Ingram Livers   -Await pathology from EGD  -Tolerating diet   -IV PPI  -IV analgesia  -IV fluids, IV abx   -continue conversion to POs and advancement of diet for DC    History of ETOH abuse   Tobacco abuse   · Denies ETOH since 2019  · Current every day smoker   · Patient educated on the importance of smoking cessation and the health benefits associated with quitting.    · Nicotine patch ordered         Code Status: Full  Surrogate Decision Maker:  Cherylene Joe  [phone]             DVT Prophylaxis: Hold anticoagulation for possible procedure in a.m. GI Prophylaxis: not indicated     Baseline: Independent    18.5 - 24.9 Normal weight / Body mass index is 21.22 kg/m². Code status: Full     Subjective:     Chief Complaint / Reason for Physician Visit  FU abdominal pain . tolerating diet . Discussed with RN events overnight.      Review of Systems:  Symptom Y/N Comments  Symptom Y/N Comments   Fever/Chills n   Chest Pain n    Poor Appetite n   Edema n    Cough n   Abdominal Pain n    Sputum n   Joint Pain n    SOB/GARZA n   Pruritis/Rash n    Nausea/vomit n   Tolerating PT/OT     Diarrhea n   Tolerating Diet     Constipation n   Other       Could NOT obtain due to:      Objective:     VITALS:   Last 24hrs VS reviewed since prior progress note. Most recent are:  Patient Vitals for the past 24 hrs:   Temp Pulse Resp BP SpO2   09/05/20 1526 98.5 °F (36.9 °C) 70 18 130/70 98 %   09/05/20 0743 98 °F (36.7 °C) 64 18 122/63 99 %   09/04/20 2309 98.5 °F (36.9 °C) 68 16 156/78 99 %   09/04/20 1902 98 °F (36.7 °C) 64 18 132/63 100 %       Intake/Output Summary (Last 24 hours) at 9/5/2020 1751  Last data filed at 9/5/2020 1542  Gross per 24 hour   Intake 1300 ml   Output    Net 1300 ml        PHYSICAL EXAM:  General: WD, WN. Alert, cooperative, no acute distress    EENT:  EOMI. Anicteric sclerae. MMM  Resp:  CTA bilaterally, no wheezing or rales. No accessory muscle use  CV:  Regular  rhythm,  No edema  GI:  Soft, Non distended, Mildly tender.  +Bowel sounds  Neurologic:  Alert and oriented X 3, normal speech,   Psych:  fair insight. Not anxious nor agitated  Skin:  No rashes. No jaundice    Reviewed most current lab test results and cultures  YES  Reviewed most current radiology test results   YES  Review and summation of old records today    NO  Reviewed patient's current orders and MAR    YES  PMH/ reviewed - no change compared to H&P  ________________________________________________________________________  Care Plan discussed with:    Comments   Patient x    Family      RN x    Care Manager     Consultant                        Multidiciplinary team rounds were held today with , nursing, pharmacist and clinical coordinator. Patient's plan of care was discussed; medications were reviewed and discharge planning was addressed.      ________________________________________________________________________  Total NON critical care TIME:  35 Minutes    Total CRITICAL CARE TIME Spent:   Minutes non procedure based      Comments   >50% of visit spent in counseling and coordination of care     ________________________________________________________________________  Marietta Muse MD     Procedures: see electronic medical records for all procedures/Xrays and details which were not copied into this note but were reviewed prior to creation of Plan. LABS:  I reviewed today's most current labs and imaging studies.   Pertinent labs include:  Recent Labs     09/05/20  0341 09/04/20 0305 09/03/20  0257   WBC 10.5 11.0 12.9*   HGB 13.6 13.2 12.8   HCT 43.4 41.1 41.0    333 326     Recent Labs     09/04/20 0305      K 3.5      CO2 29   *   BUN 11   CREA 1.11   CA 8.7       Signed: Abraham Briones MD

## 2020-09-06 LAB
AMYLASE SERPL-CCNC: 100 U/L (ref 25–115)
ANION GAP SERPL CALC-SCNC: 1 MMOL/L (ref 5–15)
BACTERIA SPEC CULT: NORMAL
BACTERIA SPEC CULT: NORMAL
BUN SERPL-MCNC: 12 MG/DL (ref 6–20)
BUN/CREAT SERPL: 10 (ref 12–20)
CALCIUM SERPL-MCNC: 9.1 MG/DL (ref 8.5–10.1)
CHLORIDE SERPL-SCNC: 105 MMOL/L (ref 97–108)
CO2 SERPL-SCNC: 34 MMOL/L (ref 21–32)
CREAT SERPL-MCNC: 1.17 MG/DL (ref 0.7–1.3)
GLUCOSE SERPL-MCNC: 95 MG/DL (ref 65–100)
LIPASE SERPL-CCNC: 135 U/L (ref 73–393)
POTASSIUM SERPL-SCNC: 3.9 MMOL/L (ref 3.5–5.1)
SERVICE CMNT-IMP: NORMAL
SERVICE CMNT-IMP: NORMAL
SODIUM SERPL-SCNC: 140 MMOL/L (ref 136–145)

## 2020-09-06 PROCEDURE — C9113 INJ PANTOPRAZOLE SODIUM, VIA: HCPCS | Performed by: HOSPITALIST

## 2020-09-06 PROCEDURE — 74011000258 HC RX REV CODE- 258: Performed by: HOSPITALIST

## 2020-09-06 PROCEDURE — 74011000250 HC RX REV CODE- 250: Performed by: HOSPITALIST

## 2020-09-06 PROCEDURE — 74011250636 HC RX REV CODE- 250/636: Performed by: INTERNAL MEDICINE

## 2020-09-06 PROCEDURE — 74011250637 HC RX REV CODE- 250/637: Performed by: INTERNAL MEDICINE

## 2020-09-06 PROCEDURE — 99231 SBSQ HOSP IP/OBS SF/LOW 25: CPT | Performed by: SURGERY

## 2020-09-06 PROCEDURE — 80048 BASIC METABOLIC PNL TOTAL CA: CPT

## 2020-09-06 PROCEDURE — 74011250636 HC RX REV CODE- 250/636: Performed by: HOSPITALIST

## 2020-09-06 PROCEDURE — 82150 ASSAY OF AMYLASE: CPT

## 2020-09-06 PROCEDURE — 65270000029 HC RM PRIVATE

## 2020-09-06 PROCEDURE — 83690 ASSAY OF LIPASE: CPT

## 2020-09-06 PROCEDURE — 36415 COLL VENOUS BLD VENIPUNCTURE: CPT

## 2020-09-06 RX ORDER — MORPHINE SULFATE 2 MG/ML
2 INJECTION, SOLUTION INTRAMUSCULAR; INTRAVENOUS
Status: DISCONTINUED | OUTPATIENT
Start: 2020-09-06 | End: 2020-09-07

## 2020-09-06 RX ADMIN — SODIUM CHLORIDE 40 MG: 9 INJECTION, SOLUTION INTRAMUSCULAR; INTRAVENOUS; SUBCUTANEOUS at 21:06

## 2020-09-06 RX ADMIN — MORPHINE SULFATE 2 MG: 2 INJECTION, SOLUTION INTRAMUSCULAR; INTRAVENOUS at 21:01

## 2020-09-06 RX ADMIN — Medication 10 ML: at 05:08

## 2020-09-06 RX ADMIN — PIPERACILLIN AND TAZOBACTAM 3.38 G: 3; .375 INJECTION, POWDER, LYOPHILIZED, FOR SOLUTION INTRAVENOUS at 05:07

## 2020-09-06 RX ADMIN — MORPHINE SULFATE 15 MG: 15 TABLET ORAL at 05:07

## 2020-09-06 RX ADMIN — PIPERACILLIN AND TAZOBACTAM 3.38 G: 3; .375 INJECTION, POWDER, LYOPHILIZED, FOR SOLUTION INTRAVENOUS at 13:14

## 2020-09-06 RX ADMIN — SODIUM CHLORIDE 125 ML/HR: 900 INJECTION, SOLUTION INTRAVENOUS at 13:14

## 2020-09-06 RX ADMIN — MORPHINE SULFATE 15 MG: 15 TABLET ORAL at 09:06

## 2020-09-06 RX ADMIN — AMLODIPINE BESYLATE 5 MG: 5 TABLET ORAL at 09:06

## 2020-09-06 RX ADMIN — MORPHINE SULFATE 2 MG: 2 INJECTION, SOLUTION INTRAMUSCULAR; INTRAVENOUS at 13:14

## 2020-09-06 RX ADMIN — SODIUM CHLORIDE 125 ML/HR: 900 INJECTION, SOLUTION INTRAVENOUS at 21:07

## 2020-09-06 RX ADMIN — PIPERACILLIN AND TAZOBACTAM 3.38 G: 3; .375 INJECTION, POWDER, LYOPHILIZED, FOR SOLUTION INTRAVENOUS at 21:06

## 2020-09-06 RX ADMIN — SODIUM CHLORIDE 40 MG: 9 INJECTION, SOLUTION INTRAMUSCULAR; INTRAVENOUS; SUBCUTANEOUS at 09:06

## 2020-09-06 RX ADMIN — MORPHINE SULFATE 15 MG: 15 TABLET ORAL at 01:14

## 2020-09-06 RX ADMIN — MORPHINE SULFATE 2 MG: 2 INJECTION, SOLUTION INTRAMUSCULAR; INTRAVENOUS at 17:07

## 2020-09-06 RX ADMIN — Medication 10 ML: at 13:14

## 2020-09-06 NOTE — PROGRESS NOTES
Hospitalist Progress Note    NAME: Sunni Levin   :  1960   MRN:  [id number]       Assessment / Plan:  Abdominal pain-POA   - Pancreatic pseudocyst likely from 2019 episode of pancreatitis   - Pancreatitis, Duodenitis, likely cause of duodenal inflammation and pain  - Diet was advanced yesterday, and he is now having 9-10/10 pain  - We re-initate IV pain meds and make NPO due to acute worsening  - Patient's exam is NOT consistent with report of 1010 pain  - Elevated lipase level 512. - Patient was seen by GI Dr Shantelle Mcmillan   - Await pathology from EGD  - Tolerating diet   - IV PPI  - IV analgesia  - IV fluids, IV abx   - Continue conversion to POs and advancement of diet for DC    CT ABD PELV W CONT    1. Persistent fluid collection adjacent to the second portion of the duodenum  which is slightly increased in size although not significantly. 2.Persistent adjacent wall thickening of the duodenum. 3. Other incidental findings as described below      History of ETOH abuse   Tobacco abuse   - Denies ETOH since 2019  - Current every day smoker   - Patient educated on the importance of smoking cessation and the health benefits associated with quitting.    - Nicotine patch ordered         Code Status: Full  Surrogate Decision Maker:  Lori Tomy  [phone]             DVT Prophylaxis: Hold anticoagulation for possible procedure in a.m. GI Prophylaxis: not indicated     Baseline: Independent    18.5 - 24.9 Normal weight / Body mass index is 21.22 kg/m². Code status: Full     Subjective:     Chief Complaint / Reason for Physician Visit  FU abdominal pain. Patient states he is NOT happy. He states his pain is severe, burning, radiating from his epigastric and RUQ to his back, and feels like he is on fire and being twisted. He wants IV pain meds and he wants them more frequently. He is very unhappy. He also wants a shower. Discussed with RN events overnight. RN states that patient was hitting the call bell 30-90 minutes before his next meds were due the entire night. They state he consistently told them his pain was out of control. They state they also noted he did not appear to be as uncomfortable as stated. Review of Systems:  Symptom Y/N Comments  Symptom Y/N Comments   Fever/Chills n   Chest Pain n    Poor Appetite n   Edema n    Cough n   Abdominal Pain Y 10/10   Sputum n   Joint Pain n    SOB/GARZA n   Pruritis/Rash n    Nausea/vomit n   Tolerating PT/OT     Diarrhea n   Tolerating Diet n    Constipation n   Other       Could NOT obtain due to:      Objective:     VITALS:   Last 24hrs VS reviewed since prior progress note. Most recent are:  Patient Vitals for the past 24 hrs:   Temp Pulse Resp BP SpO2   09/06/20 0906 98.1 °F (36.7 °C) 65 18 114/64 99 %   09/05/20 1940 98.3 °F (36.8 °C) 64 18 121/62 100 %   09/05/20 1526 98.5 °F (36.9 °C) 70 18 130/70 98 %       Intake/Output Summary (Last 24 hours) at 9/6/2020 1127  Last data filed at 9/6/2020 0958  Gross per 24 hour   Intake 3126.67 ml   Output    Net 3126.67 ml        PHYSICAL EXAM:  General: WD, WN. Alert, Unhappy, cooperative, no acute distress. Sitting up in bed. Wants a shower. Appears comfortable    EENT:  EOMI. Anicteric sclerae. MMM  Resp:  CTA bilaterally, no wheezing or rales. No accessory muscle use  CV:  Regular  rhythm,  No edema  GI:  Soft, Non distended, No grimace/tenderness to pressure with stethoscope, but patient states 10/10 when palpated.  +Bowel sounds  Neurologic:  Alert and oriented X 3, normal speech,   Psych:  Fair insight. Not anxious nor agitated  Skin:  No rashes.   No jaundice    Reviewed most current lab test results and cultures  YES  Reviewed most current radiology test results   YES  Review and summation of old records today    NO  Reviewed patient's current orders and MAR    YES  PMH/SH reviewed - no change compared to H&P  ________________________________________________________________________  Care Plan discussed with:    Comments   Patient x    Family      RN x    Care Manager     Consultant                        Multidiciplinary team rounds were held today with , nursing, pharmacist and clinical coordinator. Patient's plan of care was discussed; medications were reviewed and discharge planning was addressed. ________________________________________________________________________  Total NON critical care TIME:  50  Minutes    Total CRITICAL CARE TIME Spent:   Minutes non procedure based      Comments   >50% of visit spent in counseling and coordination of care x Used interpretation line to have an extensive discussion with patient    ________________________________________________________________________  Mari Fitzpatrick MD     Procedures: see electronic medical records for all procedures/Xrays and details which were not copied into this note but were reviewed prior to creation of Plan. LABS:  I reviewed today's most current labs and imaging studies.   Pertinent labs include:  Recent Labs     09/05/20  0341 09/04/20  0305   WBC 10.5 11.0   HGB 13.6 13.2   HCT 43.4 41.1    333     Recent Labs     09/06/20  0118 09/04/20  0305    139   K 3.9 3.5    105   CO2 34* 29   GLU 95 128*   BUN 12 11   CREA 1.17 1.11   CA 9.1 8.7       Signed: Mari Fitzpatrick MD

## 2020-09-06 NOTE — PROGRESS NOTES
Admit Date: 2020    Subjective:     Patient has complaints of pain. Requiring iv narcotics. Currently npo per primary team.    Objective:     Blood pressure 114/64, pulse 65, temperature 98.1 °F (36.7 °C), resp. rate 18, height 5' 11\" (1.803 m), weight 152 lb 1.9 oz (69 kg), SpO2 99 %. Temp (24hrs), Av.3 °F (36.8 °C), Min:98.1 °F (36.7 °C), Max:98.5 °F (36.9 °C)      Physical Exam:  GENERAL: alert, cooperative, no distress, appears stated age, LUNG: clear to auscultation bilaterally, HEART: regular rate and rhythm, ABDOMEN: soft, ND, tender upper abdomen without peritoneal signs, EXTREMITIES:  extremities normal, atraumatic, no cyanosis or edema    Labs:   Recent Results (from the past 24 hour(s))   METABOLIC PANEL, BASIC    Collection Time: 20  1:18 AM   Result Value Ref Range    Sodium 140 136 - 145 mmol/L    Potassium 3.9 3.5 - 5.1 mmol/L    Chloride 105 97 - 108 mmol/L    CO2 34 (H) 21 - 32 mmol/L    Anion gap 1 (L) 5 - 15 mmol/L    Glucose 95 65 - 100 mg/dL    BUN 12 6 - 20 MG/DL    Creatinine 1.17 0.70 - 1.30 MG/DL    BUN/Creatinine ratio 10 (L) 12 - 20      GFR est AA >60 >60 ml/min/1.73m2    GFR est non-AA >60 >60 ml/min/1.73m2    Calcium 9.1 8.5 - 10.1 MG/DL       Data Review images and reports reviewed    Assessment:     Active Problems:    Duodenal abscess (2020)      Abdominal pain (2020)        Plan/Recommendations/Medical Decision Making:     Continue present treatment   Appears to be improving although subjectively c/o pain. EGD bx showing solitary granuloma in setting of chronic active duodenitis raises concern for possible Crohn's disease. No clear indication for surgical intervention presently. Jose Wallace.  Yisroel Koyanagi, MD, [facility]

## 2020-09-07 PROCEDURE — 74011250637 HC RX REV CODE- 250/637: Performed by: STUDENT IN AN ORGANIZED HEALTH CARE EDUCATION/TRAINING PROGRAM

## 2020-09-07 PROCEDURE — 65270000029 HC RM PRIVATE

## 2020-09-07 PROCEDURE — 74011250636 HC RX REV CODE- 250/636: Performed by: HOSPITALIST

## 2020-09-07 PROCEDURE — 74011000250 HC RX REV CODE- 250: Performed by: HOSPITALIST

## 2020-09-07 PROCEDURE — 99231 SBSQ HOSP IP/OBS SF/LOW 25: CPT | Performed by: SURGERY

## 2020-09-07 PROCEDURE — 74011250636 HC RX REV CODE- 250/636: Performed by: INTERNAL MEDICINE

## 2020-09-07 PROCEDURE — C9113 INJ PANTOPRAZOLE SODIUM, VIA: HCPCS | Performed by: HOSPITALIST

## 2020-09-07 PROCEDURE — 74011000258 HC RX REV CODE- 258: Performed by: HOSPITALIST

## 2020-09-07 PROCEDURE — 74011250636 HC RX REV CODE- 250/636: Performed by: STUDENT IN AN ORGANIZED HEALTH CARE EDUCATION/TRAINING PROGRAM

## 2020-09-07 PROCEDURE — 74011250637 HC RX REV CODE- 250/637: Performed by: INTERNAL MEDICINE

## 2020-09-07 RX ORDER — MORPHINE SULFATE 15 MG/1
15 TABLET ORAL
Status: DISCONTINUED | OUTPATIENT
Start: 2020-09-07 | End: 2020-09-08 | Stop reason: HOSPADM

## 2020-09-07 RX ORDER — MORPHINE SULFATE 15 MG/1
15 TABLET, FILM COATED, EXTENDED RELEASE ORAL EVERY 12 HOURS
Status: DISCONTINUED | OUTPATIENT
Start: 2020-09-07 | End: 2020-09-08 | Stop reason: HOSPADM

## 2020-09-07 RX ORDER — ENOXAPARIN SODIUM 100 MG/ML
40 INJECTION SUBCUTANEOUS EVERY 24 HOURS
Status: DISCONTINUED | OUTPATIENT
Start: 2020-09-07 | End: 2020-09-08 | Stop reason: HOSPADM

## 2020-09-07 RX ORDER — MORPHINE SULFATE 15 MG/1
7.5 TABLET ORAL
Status: DISCONTINUED | OUTPATIENT
Start: 2020-09-07 | End: 2020-09-08 | Stop reason: HOSPADM

## 2020-09-07 RX ADMIN — MORPHINE SULFATE 15 MG: 15 TABLET, FILM COATED, EXTENDED RELEASE ORAL at 11:14

## 2020-09-07 RX ADMIN — AMLODIPINE BESYLATE 5 MG: 5 TABLET ORAL at 08:59

## 2020-09-07 RX ADMIN — PIPERACILLIN AND TAZOBACTAM 3.38 G: 3; .375 INJECTION, POWDER, LYOPHILIZED, FOR SOLUTION INTRAVENOUS at 13:35

## 2020-09-07 RX ADMIN — SODIUM CHLORIDE 40 MG: 9 INJECTION, SOLUTION INTRAMUSCULAR; INTRAVENOUS; SUBCUTANEOUS at 21:23

## 2020-09-07 RX ADMIN — SODIUM CHLORIDE 40 MG: 9 INJECTION, SOLUTION INTRAMUSCULAR; INTRAVENOUS; SUBCUTANEOUS at 09:00

## 2020-09-07 RX ADMIN — PIPERACILLIN AND TAZOBACTAM 3.38 G: 3; .375 INJECTION, POWDER, LYOPHILIZED, FOR SOLUTION INTRAVENOUS at 05:17

## 2020-09-07 RX ADMIN — MORPHINE SULFATE 15 MG: 15 TABLET, FILM COATED, EXTENDED RELEASE ORAL at 21:23

## 2020-09-07 RX ADMIN — MORPHINE SULFATE 2 MG: 2 INJECTION, SOLUTION INTRAMUSCULAR; INTRAVENOUS at 01:03

## 2020-09-07 RX ADMIN — PIPERACILLIN AND TAZOBACTAM 3.38 G: 3; .375 INJECTION, POWDER, LYOPHILIZED, FOR SOLUTION INTRAVENOUS at 21:24

## 2020-09-07 RX ADMIN — ENOXAPARIN SODIUM 40 MG: 40 INJECTION SUBCUTANEOUS at 21:23

## 2020-09-07 RX ADMIN — Medication 10 ML: at 13:40

## 2020-09-07 RX ADMIN — MORPHINE SULFATE 7.5 MG: 15 TABLET ORAL at 17:44

## 2020-09-07 RX ADMIN — MORPHINE SULFATE 2 MG: 2 INJECTION, SOLUTION INTRAMUSCULAR; INTRAVENOUS at 05:17

## 2020-09-07 RX ADMIN — SODIUM CHLORIDE 125 ML/HR: 900 INJECTION, SOLUTION INTRAVENOUS at 13:35

## 2020-09-07 RX ADMIN — MORPHINE SULFATE 2 MG: 2 INJECTION, SOLUTION INTRAMUSCULAR; INTRAVENOUS at 08:59

## 2020-09-07 RX ADMIN — MORPHINE SULFATE 7.5 MG: 15 TABLET ORAL at 13:31

## 2020-09-07 RX ADMIN — Medication 10 ML: at 21:23

## 2020-09-07 RX ADMIN — Medication 10 ML: at 05:18

## 2020-09-07 NOTE — PROGRESS NOTES
Admit Date: 2020    Subjective:     Patient feeling better. Has diet ordered but not received yet. Objective:     Blood pressure 140/77, pulse 67, temperature 98 °F (36.7 °C), resp. rate 16, height 5' 11\" (1.803 m), weight 152 lb 1.9 oz (69 kg), SpO2 100 %. Temp (24hrs), Av.1 °F (36.7 °C), Min:98 °F (36.7 °C), Max:98.4 °F (36.9 °C)      Physical Exam:  GENERAL: alert, cooperative, no distress, appears stated age, LUNG: clear to auscultation bilaterally, HEART: regular rate and rhythm, ABDOMEN: soft, ND, tender upper abdomen without peritoneal signs, EXTREMITIES:  extremities normal, atraumatic, no cyanosis or edema    Labs:   Recent Results (from the past 24 hour(s))   LIPASE    Collection Time: 20  1:21 PM   Result Value Ref Range    Lipase 135 73 - 393 U/L   AMYLASE    Collection Time: 20  1:21 PM   Result Value Ref Range    Amylase 100 25 - 115 U/L       Data Review images and reports reviewed    Assessment:     Active Problems:    Duodenal abscess (2020)      Abdominal pain (2020)        Plan/Recommendations/Medical Decision Making:     Continue present treatment   Clinically better  EGD bx showing solitary granuloma in setting of chronic active duodenitis raises concern for possible Crohn's disease. No clear indication for surgical intervention presently. Savita Masterson.  Dania Zhu MD, [facility]

## 2020-09-07 NOTE — PROGRESS NOTES
Problem: Falls - Risk of  Goal: *Absence of Falls  Description: Document Vidalab Hitchcock Fall Risk and appropriate interventions in the flowsheet.   Outcome: Progressing Towards Goal  Note: Fall Risk Interventions:            Medication Interventions: Teach patient to arise slowly, Patient to call before getting OOB         History of Falls Interventions: Consult care management for discharge planning, Door open when patient unattended, Evaluate medications/consider consulting pharmacy, Room close to nurse's station, Utilize gait belt for transfer/ambulation, Assess for delayed presentation/identification of injury for 48 hrs (comment for end date), Vital signs minimum Q4HRs X 24 hrs (comment for end date)         Problem: Patient Education: Go to Patient Education Activity  Goal: Patient/Family Education  Outcome: Progressing Towards Goal
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
## 2020-09-07 NOTE — PROGRESS NOTES
Bedside and Verbal shift change report given to [address] Renaldo Boyce (oncoming nurse) by Logan Villafana (offgoing nurse). Report included the following information SBAR, Kardex, Intake/Output, MAR and Recent Results.

## 2020-09-08 VITALS
HEART RATE: 67 BPM | WEIGHT: 153 LBS | OXYGEN SATURATION: 99 % | HEIGHT: 71 IN | TEMPERATURE: 98.2 F | DIASTOLIC BLOOD PRESSURE: 83 MMHG | BODY MASS INDEX: 21.42 KG/M2 | SYSTOLIC BLOOD PRESSURE: 142 MMHG | RESPIRATION RATE: 16 BRPM

## 2020-09-08 LAB
ANION GAP SERPL CALC-SCNC: 0 MMOL/L (ref 5–15)
BUN SERPL-MCNC: 11 MG/DL (ref 6–20)
BUN/CREAT SERPL: 9 (ref 12–20)
CALCIUM SERPL-MCNC: 8.5 MG/DL (ref 8.5–10.1)
CHLORIDE SERPL-SCNC: 107 MMOL/L (ref 97–108)
CO2 SERPL-SCNC: 33 MMOL/L (ref 21–32)
CREAT SERPL-MCNC: 1.18 MG/DL (ref 0.7–1.3)
GLUCOSE SERPL-MCNC: 115 MG/DL (ref 65–100)
POTASSIUM SERPL-SCNC: 3.8 MMOL/L (ref 3.5–5.1)
SODIUM SERPL-SCNC: 140 MMOL/L (ref 136–145)

## 2020-09-08 PROCEDURE — 74011250636 HC RX REV CODE- 250/636: Performed by: HOSPITALIST

## 2020-09-08 PROCEDURE — 74011250637 HC RX REV CODE- 250/637: Performed by: STUDENT IN AN ORGANIZED HEALTH CARE EDUCATION/TRAINING PROGRAM

## 2020-09-08 PROCEDURE — 74011000250 HC RX REV CODE- 250: Performed by: HOSPITALIST

## 2020-09-08 PROCEDURE — C9113 INJ PANTOPRAZOLE SODIUM, VIA: HCPCS | Performed by: HOSPITALIST

## 2020-09-08 PROCEDURE — 74011250636 HC RX REV CODE- 250/636: Performed by: INTERNAL MEDICINE

## 2020-09-08 PROCEDURE — 74011000258 HC RX REV CODE- 258: Performed by: HOSPITALIST

## 2020-09-08 PROCEDURE — 80048 BASIC METABOLIC PNL TOTAL CA: CPT

## 2020-09-08 PROCEDURE — 36415 COLL VENOUS BLD VENIPUNCTURE: CPT

## 2020-09-08 PROCEDURE — 99232 SBSQ HOSP IP/OBS MODERATE 35: CPT | Performed by: SURGERY

## 2020-09-08 PROCEDURE — 74011250637 HC RX REV CODE- 250/637: Performed by: INTERNAL MEDICINE

## 2020-09-08 RX ORDER — IBUPROFEN 200 MG
1 TABLET ORAL EVERY 24 HOURS
Qty: 30 PATCH | Refills: 0 | Status: SHIPPED | OUTPATIENT
Start: 2020-09-08 | End: 2020-10-08

## 2020-09-08 RX ORDER — POLYETHYLENE GLYCOL 3350 17 G/17G
17 POWDER, FOR SOLUTION ORAL
Qty: 30 PACKET | Refills: 0 | Status: SHIPPED | OUTPATIENT
Start: 2020-09-08 | End: 2021-11-18 | Stop reason: SDUPTHER

## 2020-09-08 RX ORDER — MORPHINE SULFATE 15 MG/1
15 TABLET ORAL
Qty: 10 TAB | Refills: 0 | Status: SHIPPED | OUTPATIENT
Start: 2020-09-08 | End: 2020-09-08

## 2020-09-08 RX ORDER — ONDANSETRON 4 MG/1
4 TABLET, FILM COATED ORAL
Qty: 30 TAB | Refills: 0 | Status: SHIPPED | OUTPATIENT
Start: 2020-09-08 | End: 2020-09-11

## 2020-09-08 RX ORDER — CEFDINIR 300 MG/1
300 CAPSULE ORAL 2 TIMES DAILY
Qty: 14 CAP | Refills: 0 | Status: SHIPPED | OUTPATIENT
Start: 2020-09-08 | End: 2021-08-05

## 2020-09-08 RX ORDER — METRONIDAZOLE 500 MG/1
500 TABLET ORAL 2 TIMES DAILY
Qty: 14 TAB | Refills: 0 | Status: SHIPPED | OUTPATIENT
Start: 2020-09-08 | End: 2020-09-11

## 2020-09-08 RX ORDER — AMLODIPINE BESYLATE 5 MG/1
5 TABLET ORAL DAILY
Qty: 30 TAB | Refills: 0 | Status: ON HOLD | OUTPATIENT
Start: 2020-09-09 | End: 2021-08-13 | Stop reason: SDUPTHER

## 2020-09-08 RX ORDER — OXYCODONE HYDROCHLORIDE 5 MG/1
7.5 TABLET ORAL
Qty: 10 TAB | Refills: 0 | Status: SHIPPED | OUTPATIENT
Start: 2020-09-08 | End: 2020-09-11 | Stop reason: SDUPTHER

## 2020-09-08 RX ADMIN — MORPHINE SULFATE 7.5 MG: 15 TABLET ORAL at 01:19

## 2020-09-08 RX ADMIN — MORPHINE SULFATE 15 MG: 15 TABLET, FILM COATED, EXTENDED RELEASE ORAL at 08:44

## 2020-09-08 RX ADMIN — PIPERACILLIN AND TAZOBACTAM 3.38 G: 3; .375 INJECTION, POWDER, LYOPHILIZED, FOR SOLUTION INTRAVENOUS at 05:18

## 2020-09-08 RX ADMIN — MORPHINE SULFATE 7.5 MG: 15 TABLET ORAL at 13:36

## 2020-09-08 RX ADMIN — Medication 10 ML: at 13:37

## 2020-09-08 RX ADMIN — SODIUM CHLORIDE 125 ML/HR: 900 INJECTION, SOLUTION INTRAVENOUS at 11:23

## 2020-09-08 RX ADMIN — Medication 10 ML: at 05:18

## 2020-09-08 RX ADMIN — PIPERACILLIN AND TAZOBACTAM 3.38 G: 3; .375 INJECTION, POWDER, LYOPHILIZED, FOR SOLUTION INTRAVENOUS at 13:37

## 2020-09-08 RX ADMIN — AMLODIPINE BESYLATE 5 MG: 5 TABLET ORAL at 08:44

## 2020-09-08 RX ADMIN — SODIUM CHLORIDE 40 MG: 9 INJECTION, SOLUTION INTRAMUSCULAR; INTRAVENOUS; SUBCUTANEOUS at 08:44

## 2020-09-08 RX ADMIN — MORPHINE SULFATE 7.5 MG: 15 TABLET ORAL at 05:17

## 2020-09-08 NOTE — PROGRESS NOTES
Bedside and Verbal shift change report given to Jenniffer Garcia RN (oncoming nurse) by Nanci Cerrato (offgoing nurse). Report included the following information SBAR, Kardex, Intake/Output, MAR and Recent Results.

## 2020-09-08 NOTE — PROGRESS NOTES
VILLA  1) home with family and follow up appointments   2) family to provide transportation at d/c  3) clear for d/c from CM standpoint     CM spoke to MD who reported patient cleared for d/c. CM scheduled gastro appointment and placed on AVS. CM specialist scheduled PCP appointment and placed on AVS.     Eddie RuggieromiyaLens, [facility]

## 2020-09-08 NOTE — PROGRESS NOTES
Reviewed discharge instructions, prescriptions, and side effects with patient using the blue phone  to translate instructions. Reviewed follow-up instructions, and medication instructions. Answered all questions and provided contact information for future questions. Took IV out per policy, Catheter tip intact. Provided Post-op Hygiene kit. Going home in a car with family.

## 2020-09-08 NOTE — PROGRESS NOTES
Admit Date: 2020    POD * No surgery found *    Procedure:  * No surgery found *      Assessment:   Active Problems:    Duodenal abscess (2020)      Abdominal pain (2020)      1. Duodenal/peripancreatic collection ? ?hematoma, tumor, ? infection  2. CT 2020 with much improved. Only a small amount of residual inflammation  3. Nausea and vomiting improved  4. Still with pain after eating but improving  5.  services were utilized (blue phone)  6. ETOH abuse  7. Interpretor services were utilized      Plan/Recommendations/Medical Decision Makin. ContinueAbx  2. I think he should be able to go home in the next few days  3.  diet as tolerated  4. No indication for surgery at this time    Subjective:     Patient has complaints of pain. Objective:     Blood pressure 128/60, pulse 69, temperature 98.4 °F (36.9 °C), resp. rate 16, height 5' 11\" (1.803 m), weight 69.4 kg (153 lb), SpO2 99 %. Temp (24hrs), Av.3 °F (36.8 °C), Min:98 °F (36.7 °C), Max:98.7 °F (37.1 °C)      Physical Exam:  LUNG: clear to auscultation bilaterally, HEART: regular rate and rhythm, S1, S2 normal, no murmur, click, rub or gallop, ABDOMEN: soft, non-distended tender in epigastrium/RUQ without rebound/guarding.  Bowel sounds normal. No masses,  no organomegaly, EXTREMITIES:  extremities normal, atraumatic, no cyanosis or edema    Labs:   Recent Results (from the past 48 hour(s))   LIPASE    Collection Time: 20  1:21 PM   Result Value Ref Range    Lipase 135 73 - 393 U/L   AMYLASE    Collection Time: 20  1:21 PM   Result Value Ref Range    Amylase 100 25 - 086 U/L   METABOLIC PANEL, BASIC    Collection Time: 20  1:21 AM   Result Value Ref Range    Sodium 140 136 - 145 mmol/L    Potassium 3.8 3.5 - 5.1 mmol/L    Chloride 107 97 - 108 mmol/L    CO2 33 (H) 21 - 32 mmol/L    Anion gap 0 (L) 5 - 15 mmol/L    Glucose 115 (H) 65 - 100 mg/dL    BUN 11 6 - 20 MG/DL    Creatinine 1.18 0.70 - 1.30 MG/DL    BUN/Creatinine ratio 9 (L) 12 - 20      GFR est AA >60 >60 ml/min/1.73m2    GFR est non-AA >60 >60 ml/min/1.73m2    Calcium 8.5 8.5 - 10.1 MG/DL       Data Review images and reports reviewed

## 2020-09-08 NOTE — DISCHARGE SUMMARY
Hospitalist Discharge Summary     Patient ID:  Vijaya Jarquin  [id number]  64 y.o.  1960    PCP on record: Wendy Weaver MD    Admit date: 8/31/2020  Discharge date and time: 9/8/2020      Admission Diagnoses: Abdominal pain [R10.9]  Duodenal abscess [K63.0]    Discharge Diagnoses: Active Problems:    Duodenal abscess (8/27/2020)      Abdominal pain (9/1/2020)           Hospital Course:   Abdominal pain due to Pancreatitis, Duodenitis -POA Duodenal abscess   Pancreatic pseudocyst likely from 2019 episode of pancreatitis   Admission CT abd/pelvis reviewed - shows fluid collection adjacent to the second portion of the duodenum, slightly increased in size, Persistent adjacent wall thickening of the duodenum. EGD bx showing solitary granuloma in setting of chronic active duodenitis raises concern for possible Crohn's disease. Elevated lipase level 512. Patient was seen Ludmila Ha. Diet advanced and patient tolerated well. Discharged on 3 day supply of Morphine oral and instructions for low fat diet. Complete              History of ETOH abuse   Tobacco abuse   Denies ETOH since November 2019. Current every day smoker   Patient was counseled extensively on the need to abstain from tobacco, its addictive tendencies, its deleterious effects on the lungs, cardiovascular  as well as its financial & social sequelae. Nicotine patch ordered       CONSULTATIONS:  IP CONSULT TO GENERAL SURGERY  IP CONSULT TO HOSPITALIST  IP CONSULT TO GASTROENTEROLOGY    Excerpted HPI from H&P of Willy Mack MD:   64 y.o.  male with pmh as below  presents abdominal pain mostly in the right lower quadrant and right flank associated with nausea and intermittent nonbloody bilious nonbloody vomiting for the last few week via EMS. Of note he was in hosp. In early Aug 2020 with evidence duodenal perforation and medial abscess, rx with aspiration alone and antibx, and FU CT with INCREASING DUODENAL fluid collection. Patient had EGD on 828 by GI. See the report below. Currently patient is on p.o. ciprofloxacin and Flagyl at home.     Seen by Dr. Tamika Domingo , surgeon on 8/28/2018 outpatient and impression was that he may need GI  ? EGD with US drain or elective surgical exploration but need pancreatic biliary surgeon. GI Dr Zackary montanez saw pt in hospital       We were asked to admit for work up and evaluation of the above problems. ______________________________________________________________________  DISCHARGE SUMMARY/HOSPITAL COURSE:  for full details see H&P, daily progress notes, labs, consult notes. Visit Vitals  /83   Pulse 67   Temp 98.2 °F (36.8 °C)   Resp 16   Ht 5' 11\" (1.803 m)   Wt 69.4 kg (153 lb)   SpO2 99%   BMI 21.34 kg/m²       Patient seen and examined by me on discharge day. Pertinent Findings:  Gen:    Not in distress  Chest: Clear lungs  CVS:   Regular rhythm. No edema  Abd:  Soft, not distended, not tender  Neuro:  Alert with good insight. Oriented to person, place, and time   _______________________________________________________________________  DISCHARGE MEDICATIONS:   Current Discharge Medication List      START taking these medications    Details   cefdinir (OMNICEF) 300 mg capsule Take 1 Cap by mouth two (2) times a day. Qty: 14 Cap, Refills: 0      amLODIPine (NORVASC) 5 mg tablet Take 1 Tab by mouth daily. Qty: 30 Tab, Refills: 0      nicotine (NICODERM CQ) 14 mg/24 hr patch 1 Patch by TransDERmal route every twenty-four (24) hours for 30 days. Qty: 30 Patch, Refills: 0      polyethylene glycol (MIRALAX) 17 gram packet Take 1 Packet by mouth daily as needed for Constipation. Qty: 30 Packet, Refills: 0      oxyCODONE IR (ROXICODONE) 5 mg immediate release tablet Take 1.5 Tabs by mouth every six (6) hours as needed for Pain for up to 3 days. Max Daily Amount: 30 mg.  Qty: 10 Tab, Refills: 0    Associated Diagnoses: Alcohol-induced chronic pancreatitis ([facility] [address]  Pancreatic pseudocyst; Duodenal abscess         CONTINUE these medications which have CHANGED    Details   metroNIDAZOLE (FLAGYL) 500 mg tablet Take 1 Tab by mouth two (2) times a day. Qty: 14 Tab, Refills: 0      ondansetron hcl (Zofran) 4 mg tablet Take 1 Tab by mouth every eight (8) hours as needed for Nausea or Vomiting. Qty: 30 Tab, Refills: 0         STOP taking these medications       ciprofloxacin HCl (Cipro) 500 mg tablet Comments:   Reason for Stopping:         oxyCODONE-acetaminophen (Percocet) 5-325 mg per tablet Comments:   Reason for Stopping:         ciprofloxacin HCl (CIPRO) 500 mg tablet Comments:   Reason for Stopping:               My Recommended Diet, Activity, Wound Care, and follow-up labs are listed in the patient's Discharge Insturctions which I have personally completed and reviewed.     _______________________________________________________________________  DISPOSITION:     Home with Family: x   Home with HH/PT/OT/RN:    SNF/LTC:    TE:    OTHER:        Condition at Discharge:  Stable  _______________________________________________________________________  Follow up with:   PCP : Ramona Hernández MD  Follow-up Information     Follow up With Specialties Details Why Jonathan Nguyen MD Gastroenterology On 10/8/2020 Your gastroenterology follow up appointment is a telehealth visit at 1:45pm  [address] [place] [address]  [address] [facility]  [phone]      Ramona Hernández MD Internal Medicine Go on 9/11/2020 For [facility] hospital follow up appointment at 10:10AM  [address] [place] [address]  [phone]                Total time in minutes spent coordinating this discharge (includes going over instructions, follow-up, prescriptions, and preparing report for sign off to her PCP) :  35 minutes    Signed:  Derick Johnson MD

## 2020-09-08 NOTE — PROGRESS NOTES
Comprehensive Nutrition Assessment    Type and Reason for Visit: Initial    Nutrition Recommendations/Plan:   · Continue regular, low fat diet  · Add Ensure clear BID  · Please document % meals and supplements consumed in flowsheet I/O's under intake     Nutrition Assessment:      LOS pt. Chart reviewed. Med noted for pancreatitis, duodenitis, duodenal abscess. Hx of etoh abuse and PUD. Pt hospitalized last month with duodenal perforation and medial abscess. [place]-speaking, required  for interview (blue phone). Pt reports feeling hungry today and ate >50% breakfast and lunch. Pt reports UBW is 152lb and he started losing wt following operation in March. CBW (bed source) 153lb. See wt hx below. Per MD note following EGD, possible Crohn's disease. Wt Readings from Last 10 Encounters:   09/08/20 69.4 kg (153 lb)   08/27/20 60.4 kg (133 lb 2.5 oz)   08/27/20 60.8 kg (134 lb)   08/13/20 60.8 kg (134 lb)   08/18/20 60.8 kg (134 lb 0.6 oz)   ]    Estimated Daily Nutrient Needs:  Energy (kcal):  2003 (BMR 1541 x 1. 3AF)  Protein (g):  69-76g (1.0-1.2g/kg)       Fluid (ml/day):  2000ml    Nutrition Related Findings:  Labs: reviewed. Meds: lovenox, morphine, protonix, zosyn. BM 9/5. Wounds:    None       Current Nutrition Therapies:   DIET REGULAR  DIET ONE TIME MESSAGE    Anthropometric Measures:  · Height:  5' 11\" (180.3 cm)  · Current Body Wt:  69.4 kg (153 lb)   · Admission Body Wt:       · Usual Body Wt:        · Ideal Body Wt:   :      · Adjusted Body Weight:   ; Weight Adjustment for: No adjustment   · Adjusted BMI:       · BMI Category:  Normal weight (BMI 18.5-24. 9)       Nutrition Diagnosis:   · Inadequate protein-energy intake related to altered GI function, endocrine dysfunction(severe pancreatitis & duodentitis) as evidenced by poor intake prior to admission, weight loss      Nutrition Interventions:   Food and/or Nutrient Delivery: Continue current diet  Nutrition Education and Counseling: No recommendations at this time  Coordination of Nutrition Care: Continued inpatient monitoring    Goals:  Pt will consume >50% meals and supplements next 4-6 days       Nutrition Monitoring and Evaluation:   Behavioral-Environmental Outcomes:    Food/Nutrient Intake Outcomes: Food and nutrient intake, Supplement intake  Physical Signs/Symptoms Outcomes: Biochemical data, Weight, GI status    Discharge Planning:    Continue current diet     Electronically signed by Muriel Wu RD on 9/8/2020 at 1:53 PM    Contact: ext [address] [place] [address] Pager [phone]

## 2020-09-11 ENCOUNTER — OFFICE VISIT (OUTPATIENT)
Dept: FAMILY MEDICINE CLINIC | Age: 60
End: 2020-09-11
Payer: MEDICAID

## 2020-09-11 VITALS
HEIGHT: 71 IN | WEIGHT: 135 LBS | BODY MASS INDEX: 18.9 KG/M2 | SYSTOLIC BLOOD PRESSURE: 111 MMHG | RESPIRATION RATE: 20 BRPM | TEMPERATURE: 98 F | HEART RATE: 101 BPM | DIASTOLIC BLOOD PRESSURE: 72 MMHG | OXYGEN SATURATION: 98 %

## 2020-09-11 DIAGNOSIS — Z13.220 SCREENING CHOLESTEROL LEVEL: ICD-10-CM

## 2020-09-11 DIAGNOSIS — K86.3 PANCREATIC PSEUDOCYST: ICD-10-CM

## 2020-09-11 DIAGNOSIS — R73.03 BORDERLINE DIABETES MELLITUS: ICD-10-CM

## 2020-09-11 DIAGNOSIS — N50.89 SCROTAL MASS: ICD-10-CM

## 2020-09-11 DIAGNOSIS — Z23 ENCOUNTER FOR IMMUNIZATION: ICD-10-CM

## 2020-09-11 DIAGNOSIS — Z12.5 SCREENING FOR PROSTATE CANCER: ICD-10-CM

## 2020-09-11 DIAGNOSIS — E55.9 VITAMIN D DEFICIENCY: ICD-10-CM

## 2020-09-11 DIAGNOSIS — K86.0 ALCOHOL-INDUCED CHRONIC PANCREATITIS (HCC): ICD-10-CM

## 2020-09-11 DIAGNOSIS — K26.5 DUODENAL ULCER WITH PERFORATION (HCC): ICD-10-CM

## 2020-09-11 DIAGNOSIS — Z11.59 NEED FOR HEPATITIS C SCREENING TEST: ICD-10-CM

## 2020-09-11 DIAGNOSIS — I10 HYPERTENSION, WELL CONTROLLED: Primary | ICD-10-CM

## 2020-09-11 DIAGNOSIS — F17.200 CURRENT EVERY DAY SMOKER: ICD-10-CM

## 2020-09-11 DIAGNOSIS — K63.0 DUODENAL ABSCESS: ICD-10-CM

## 2020-09-11 PROCEDURE — 99496 TRANSJ CARE MGMT HIGH F2F 7D: CPT | Performed by: INTERNAL MEDICINE

## 2020-09-11 PROCEDURE — 90732 PPSV23 VACC 2 YRS+ SUBQ/IM: CPT

## 2020-09-11 PROCEDURE — 90471 IMMUNIZATION ADMIN: CPT

## 2020-09-11 RX ORDER — OXYCODONE HYDROCHLORIDE 5 MG/1
7.5 TABLET ORAL
Qty: 10 TAB | Refills: 0 | Status: SHIPPED | OUTPATIENT
Start: 2020-09-11 | End: 2020-09-14

## 2020-09-11 NOTE — PROGRESS NOTES
Chief Complaint   Patient presents with   [facility] Follow Up     HPI:  Mile Griffith is a 64 y.o.  male presents for hospital follow up. Patient was admitted 8/31/20-9/8/20 with abdominal pain and Duodenal abscess (8/27/2020)  He also had acute pancreatitis with pseudocyst. Patient  Has been doing well since discharge. Reports had 2 bowel movement, abdominal pain is better.     Review of Systems  As per hpi    Past Medical History:   Diagnosis Date    Current smoker     Hypertension     Pancreatitis, chronic (HCC)     Pseudocyst of pancreas     PUD (peptic ulcer disease)      Past Surgical History:   Procedure Laterality Date    HX CHOLECYSTECTOMY      UPPER GI ENDOSCOPY,BIOPSY  8/19/2020         UPPER GI ENDOSCOPY,BIOPSY  8/28/2020          Social History     Socioeconomic History    Marital status: SINGLE     Spouse name: Not on file    Number of children: Not on file    Years of education: Not on file    Highest education level: Not on file   Tobacco Use    Smoking status: Current Every Day Smoker    Smokeless tobacco: Never Used   Substance and Sexual Activity    Alcohol use: Yes     Comment: socially    Drug use: Never     No family history on file. Current Outpatient Medications   Medication Sig Dispense Refill    cefdinir (OMNICEF) 300 mg capsule Take 1 Cap by mouth two (2) times a day. 14 Cap 0    amLODIPine (NORVASC) 5 mg tablet Take 1 Tab by mouth daily. 30 Tab 0    nicotine (NICODERM CQ) 14 mg/24 hr patch 1 Patch by TransDERmal route every twenty-four (24) hours for 30 days. 30 Patch 0    polyethylene glycol (MIRALAX) 17 gram packet Take 1 Packet by mouth daily as needed for Constipation. 30 Packet 0    oxyCODONE IR (ROXICODONE) 5 mg immediate release tablet Take 1.5 Tabs by mouth every six (6) hours as needed for Pain for up to 3 days.  Max Daily Amount: 30 mg. 10 Tab 0     No Known Allergies    Objective:  Visit Vitals  /72   Pulse (!) 101   Temp 98 °F (36.7 °C) (Oral)   Resp 20   Ht 5' 11\" (1.803 m)   Wt 135 lb (61.2 kg)   SpO2 98%   BMI 18.83 kg/m²     Physical Exam:   General appearance - alert, well appearing in no distress  Mental status - alert, oriented to person, place, and time  Neck - supple, no significant adenopathy   Chest - clear to auscultation, no wheezes, rales or rhonchi  Heart - normal rate, regular rhythm, no murmurs  Abdomen - soft, nontender, nondistended, no organomegaly  Ext-peripheral pulses normal, no pedal edema  Neuro -alert, oriented, normal speech, no focal findings   Back-full range of motion, no tenderness, palpable spasm or pain on motion     Assessment/Plan:  Diagnoses and all orders for this visit:    Hypertension, well controlled  -     METABOLIC PANEL, COMPREHENSIVE    Screening cholesterol level  -     LIPID PANEL    Need for hepatitis C screening test  -     HEPATITIS C AB    Encounter for immunization  -     PNEUMOCOCCAL POLYSACCHARIDE VACCINE, 23-VALENT, ADULT OR IMMUNOSUPPRESSED PT DOSE,    Screening for prostate cancer  -     PSA, DIAGNOSTIC (PROSTATE SPECIFIC AG)    Current every day smoker    Duodenal ulcer with perforation (HCC)  -     REFERRAL TO GASTROENTEROLOGY  -     oxyCODONE IR (ROXICODONE) 5 mg immediate release tablet; Take 1.5 Tabs by mouth every six (6) hours as needed for Pain for up to 3 days. Max Daily Amount: 30 mg., Normal, Disp-10 Tab,R-0    Borderline diabetes mellitus  -     HEMOGLOBIN A1C WITH EAG    Vitamin D deficiency  -     VITAMIN D, 25 HYDROXY    Scrotal mass  -     REFERRAL TO UROLOGY    Alcohol-induced chronic pancreatitis (HCC)  -     REFERRAL TO GASTROENTEROLOGY  -     oxyCODONE IR (ROXICODONE) 5 mg immediate release tablet; Take 1.5 Tabs by mouth every six (6) hours as needed for Pain for up to 3 days. Max Daily Amount: 30 mg., Normal, Disp-10 Tab,R-0    Pancreatic pseudocyst  -     REFERRAL TO GASTROENTEROLOGY  -     oxyCODONE IR (ROXICODONE) 5 mg immediate release tablet;  Take 1.5 Tabs by mouth every six (6) hours as needed for Pain for up to 3 days. Max Daily Amount: 30 mg., Normal, Disp-10 Tab,R-0    Duodenal abscess  -     REFERRAL TO GASTROENTEROLOGY  -     oxyCODONE IR (ROXICODONE) 5 mg immediate release tablet; Take 1.5 Tabs by mouth every six (6) hours as needed for Pain for up to 3 days. Max Daily Amount: 30 mg., Normal, Disp-10 Tab,R-0      Patient Instructions          Pancreatitis: Care Instructions  Your Care Instructions     The pancreas is an organ behind the stomach. It makes hormones and enzymes to help your body digest food. But if these enzymes attack the pancreas, it can get inflamed. This is called pancreatitis. Most cases are caused by gallstones or by heavy alcohol use. If you take care of yourself at home, it will help you get better. It will also help you avoid more problems with your pancreas. Follow-up care is a key part of your treatment and safety. Be sure to make and go to all appointments, and call your doctor if you are having problems. It's also a good idea to know your test results and keep a list of the medicines you take. How can you care for yourself at home? · Drink clear liquids and eat bland foods until you feel better. [place] foods include rice, dry toast, and crackers. They also include bananas and applesauce. · Eat a low-fat diet until your doctor says your pancreas is healed. · Do not drink alcohol. Tell your doctor if you need help to quit. Counseling, support groups, and sometimes medicines can help you stay sober. · Be safe with medicines. Read and follow all instructions on the label. ? If the doctor gave you a prescription medicine for pain, take it as prescribed. ? If you are not taking a prescription pain medicine, ask your doctor if you can take an over-the-counter medicine. · If your doctor prescribed antibiotics, take them as directed. Do not stop taking them just because you feel better. You need to take the full course of antibiotics.   · Get extra rest until you feel better. To prevent future problems with your pancreas  · Do not drink alcohol. · Tell your doctors and pharmacist that you've had pancreatitis. They can help you avoid medicines that may cause this problem again. When should you call for help? Call 911 anytime you think you may need emergency care. For example, call if:    · You vomit blood or what looks like coffee grounds.     · Your stools are maroon or very bloody. Call your doctor now or seek immediate medical care if:    · You have new or worse belly pain.     · Your stools are black and look like tar, or they have streaks of blood.     · You are vomiting. Watch closely for changes in your health, and be sure to contact your doctor if:    · You do not get better as expected. Where can you learn more? Go to http://www.gray.com/  Enter J381 in the search box to learn more about \"Pancreatitis: Care Instructions. \"  Current as of: April 15, 2020               Content Version: 12.6  © 2006-2020 [employer]. Care instructions adapted under license by [employer] (which disclaims liability or warranty for this information). If you have questions about a medical condition or this instruction, always ask your healthcare professional. [place] [address] any warranty or liability for your use of this information. Follow-up and Dispositions    · Return in about 3 months (around 12/11/2020), or if symptoms worsen or fail to improve, for routine follow.

## 2020-09-11 NOTE — PATIENT INSTRUCTIONS
Pancreatitis: Care Instructions Your Care Instructions The pancreas is an organ behind the stomach. It makes hormones and enzymes to help your body digest food. But if these enzymes attack the pancreas, it can get inflamed. This is called pancreatitis. Most cases are caused by gallstones or by heavy alcohol use. If you take care of yourself at home, it will help you get better. It will also help you avoid more problems with your pancreas. Follow-up care is a key part of your treatment and safety. Be sure to make and go to all appointments, and call your doctor if you are having problems. It's also a good idea to know your test results and keep a list of the medicines you take. How can you care for yourself at home? · Drink clear liquids and eat bland foods until you feel better. [place] foods include rice, dry toast, and crackers. They also include bananas and applesauce. · Eat a low-fat diet until your doctor says your pancreas is healed. · Do not drink alcohol. Tell your doctor if you need help to quit. Counseling, support groups, and sometimes medicines can help you stay sober. · Be safe with medicines. Read and follow all instructions on the label. ? If the doctor gave you a prescription medicine for pain, take it as prescribed. ? If you are not taking a prescription pain medicine, ask your doctor if you can take an over-the-counter medicine. · If your doctor prescribed antibiotics, take them as directed. Do not stop taking them just because you feel better. You need to take the full course of antibiotics. · Get extra rest until you feel better. To prevent future problems with your pancreas · Do not drink alcohol. · Tell your doctors and pharmacist that you've had pancreatitis. They can help you avoid medicines that may cause this problem again. When should you call for help? Call 911 anytime you think you may need emergency care. For example, call if:   · You vomit blood or what looks like coffee grounds.  
  · Your stools are maroon or very bloody. Call your doctor now or seek immediate medical care if: 
  · You have new or worse belly pain.  
  · Your stools are black and look like tar, or they have streaks of blood.  
  · You are vomiting. Watch closely for changes in your health, and be sure to contact your doctor if: 
  · You do not get better as expected. Where can you learn more? Go to http://km-donn.info/ Enter E686 in the search box to learn more about \"Pancreatitis: Care Instructions. \" Current as of: April 15, 2020               Content Version: 12.6 © 6349-0435 [employer]. Care instructions adapted under license by [employer] (which disclaims liability or warranty for this information). If you have questions about a medical condition or this instruction, always ask your healthcare professional. [address] any warranty or liability for your use of this information.

## 2020-09-12 LAB
25(OH)D3+25(OH)D2 SERPL-MCNC: 34.8 NG/ML (ref 30–100)
ALBUMIN SERPL-MCNC: 4.9 G/DL (ref 3.8–4.9)
ALBUMIN/GLOB SERPL: 1.6 {RATIO} (ref 1.2–2.2)
ALP SERPL-CCNC: 92 IU/L (ref 39–117)
ALT SERPL-CCNC: 26 IU/L (ref 0–44)
AST SERPL-CCNC: 19 IU/L (ref 0–40)
BILIRUB SERPL-MCNC: 0.3 MG/DL (ref 0–1.2)
BUN SERPL-MCNC: 11 MG/DL (ref 6–24)
BUN/CREAT SERPL: 11 (ref 9–20)
CALCIUM SERPL-MCNC: 10.8 MG/DL (ref 8.7–10.2)
CHLORIDE SERPL-SCNC: 100 MMOL/L (ref 96–106)
CHOLEST SERPL-MCNC: 255 MG/DL (ref 100–199)
CO2 SERPL-SCNC: 27 MMOL/L (ref 20–29)
CREAT SERPL-MCNC: 1.02 MG/DL (ref 0.76–1.27)
EST. AVERAGE GLUCOSE BLD GHB EST-MCNC: 126 MG/DL
GLOBULIN SER CALC-MCNC: 3 G/DL (ref 1.5–4.5)
GLUCOSE SERPL-MCNC: 76 MG/DL (ref 65–99)
HBA1C MFR BLD: 6 % (ref 4.8–5.6)
HCV AB S/CO SERPL IA: 0.2 S/CO RATIO (ref 0–0.9)
HDLC SERPL-MCNC: 50 MG/DL
LDLC SERPL CALC-MCNC: 163 MG/DL (ref 0–99)
POTASSIUM SERPL-SCNC: 5 MMOL/L (ref 3.5–5.2)
PROT SERPL-MCNC: 7.9 G/DL (ref 6–8.5)
PSA SERPL-MCNC: 0.9 NG/ML (ref 0–4)
SODIUM SERPL-SCNC: 142 MMOL/L (ref 134–144)
TRIGL SERPL-MCNC: 228 MG/DL (ref 0–149)
VLDLC SERPL CALC-MCNC: 42 MG/DL (ref 5–40)

## 2020-09-13 DIAGNOSIS — E78.2 MIXED HYPERCHOLESTEROLEMIA AND HYPERTRIGLYCERIDEMIA: Primary | ICD-10-CM

## 2020-09-13 RX ORDER — ROSUVASTATIN CALCIUM 10 MG/1
10 TABLET, COATED ORAL
Qty: 30 TAB | Refills: 2 | Status: SHIPPED | OUTPATIENT
Start: 2020-09-13 | End: 2021-08-05

## 2020-10-12 ENCOUNTER — TRANSCRIBE ORDER (OUTPATIENT)
Dept: SCHEDULING | Age: 60
End: 2020-10-12

## 2020-10-12 DIAGNOSIS — I10 HYPERTENSION: ICD-10-CM

## 2020-10-12 DIAGNOSIS — K85.20 ALCOHOL-INDUCED ACUTE PANCREATITIS WITHOUT INFECTION OR NECROSIS: Primary | ICD-10-CM

## 2020-10-12 DIAGNOSIS — F10.10 ALCOHOL ABUSE: ICD-10-CM

## 2020-12-16 ENCOUNTER — HOSPITAL ENCOUNTER (OUTPATIENT)
Dept: CT IMAGING | Age: 60
Discharge: HOME | End: 2020-12-16
Attending: INTERNAL MEDICINE
Payer: MEDICAID

## 2020-12-16 ENCOUNTER — HOSPITAL ENCOUNTER (OUTPATIENT)
Dept: CT IMAGING | Age: 60
Discharge: HOME | End: 2020-12-16
Attending: INTERNAL MEDICINE

## 2020-12-16 DIAGNOSIS — I10 HYPERTENSION: ICD-10-CM

## 2020-12-16 DIAGNOSIS — K85.20 ALCOHOL-INDUCED ACUTE PANCREATITIS WITHOUT INFECTION OR NECROSIS: ICD-10-CM

## 2020-12-16 DIAGNOSIS — F10.10 ALCOHOL ABUSE: ICD-10-CM

## 2020-12-16 PROCEDURE — 74160 CT ABDOMEN W/CONTRAST: CPT

## 2020-12-16 PROCEDURE — 74011000636 HC RX REV CODE- 636: Performed by: INTERNAL MEDICINE

## 2020-12-16 RX ORDER — SODIUM CHLORIDE 0.9 % (FLUSH) 0.9 %
10 SYRINGE (ML) INJECTION
Status: DISPENSED | OUTPATIENT
Start: 2020-12-16 | End: 2020-12-16

## 2020-12-16 RX ADMIN — IOPAMIDOL 100 ML: 755 INJECTION, SOLUTION INTRAVENOUS at 12:42

## 2020-12-16 RX ADMIN — IOHEXOL 50 ML: 240 INJECTION, SOLUTION INTRATHECAL; INTRAVASCULAR; INTRAVENOUS; ORAL at 12:42

## 2021-02-25 ENCOUNTER — VIRTUAL VISIT (OUTPATIENT)
Dept: FAMILY MEDICINE CLINIC | Age: 61
End: 2021-02-25

## 2021-08-04 ENCOUNTER — APPOINTMENT (OUTPATIENT)
Dept: CT IMAGING | Age: 61
DRG: 046 | End: 2021-08-04
Attending: EMERGENCY MEDICINE
Payer: MEDICAID

## 2021-08-04 ENCOUNTER — HOSPITAL ENCOUNTER (INPATIENT)
Age: 61
LOS: 1 days | Discharge: HOME | DRG: 046 | End: 2021-08-05
Attending: EMERGENCY MEDICINE | Admitting: INTERNAL MEDICINE
Payer: MEDICAID

## 2021-08-04 DIAGNOSIS — I65.21 CAROTID STENOSIS, RIGHT: ICD-10-CM

## 2021-08-04 DIAGNOSIS — I77.1 SUBCLAVIAN ARTERIAL STENOSIS (HCC): ICD-10-CM

## 2021-08-04 DIAGNOSIS — R29.90 STROKE-LIKE EPISODE: ICD-10-CM

## 2021-08-04 DIAGNOSIS — I65.23 BILATERAL CAROTID ARTERY STENOSIS: ICD-10-CM

## 2021-08-04 DIAGNOSIS — I63.9 CEREBROVASCULAR ACCIDENT (CVA), UNSPECIFIED MECHANISM (HCC): ICD-10-CM

## 2021-08-04 DIAGNOSIS — G45.9 TIA (TRANSIENT ISCHEMIC ATTACK): Primary | ICD-10-CM

## 2021-08-04 DIAGNOSIS — G89.29 CHRONIC LEFT-SIDED LOW BACK PAIN WITH LEFT-SIDED SCIATICA: ICD-10-CM

## 2021-08-04 DIAGNOSIS — H54.61 VISION LOSS OF RIGHT EYE: ICD-10-CM

## 2021-08-04 DIAGNOSIS — M54.42 CHRONIC LEFT-SIDED LOW BACK PAIN WITH LEFT-SIDED SCIATICA: ICD-10-CM

## 2021-08-04 DIAGNOSIS — R20.0 LEFT LEG NUMBNESS: ICD-10-CM

## 2021-08-04 DIAGNOSIS — I10 ACCELERATED HYPERTENSION: ICD-10-CM

## 2021-08-04 LAB
ALBUMIN SERPL-MCNC: 3.6 G/DL (ref 3.5–5)
ALBUMIN/GLOB SERPL: 0.8 {RATIO} (ref 1.1–2.2)
ALP SERPL-CCNC: 103 U/L (ref 45–117)
ALT SERPL-CCNC: 14 U/L (ref 12–78)
ANION GAP SERPL CALC-SCNC: 3 MMOL/L (ref 5–15)
APTT PPP: 27.1 SEC (ref 22.1–31)
AST SERPL-CCNC: 14 U/L (ref 15–37)
BASOPHILS # BLD: 0.1 K/UL (ref 0–0.1)
BASOPHILS NFR BLD: 0 % (ref 0–1)
BILIRUB SERPL-MCNC: 0.3 MG/DL (ref 0.2–1)
BUN SERPL-MCNC: 9 MG/DL (ref 6–20)
BUN/CREAT SERPL: 10 (ref 12–20)
CALCIUM SERPL-MCNC: 9.1 MG/DL (ref 8.5–10.1)
CHLORIDE SERPL-SCNC: 106 MMOL/L (ref 97–108)
CHOLEST SERPL-MCNC: 213 MG/DL
CO2 SERPL-SCNC: 31 MMOL/L (ref 21–32)
CREAT SERPL-MCNC: 0.92 MG/DL (ref 0.7–1.3)
DIFFERENTIAL METHOD BLD: ABNORMAL
EOSINOPHIL # BLD: 0.2 K/UL (ref 0–0.4)
EOSINOPHIL NFR BLD: 1 % (ref 0–7)
ERYTHROCYTE [DISTWIDTH] IN BLOOD BY AUTOMATED COUNT: 16.1 % (ref 11.5–14.5)
GLOBULIN SER CALC-MCNC: 4.4 G/DL (ref 2–4)
GLUCOSE SERPL-MCNC: 90 MG/DL (ref 65–100)
HCT VFR BLD AUTO: 48.9 % (ref 36.6–50.3)
HDLC SERPL-MCNC: 50 MG/DL
HDLC SERPL: 4.3 {RATIO} (ref 0–5)
HGB BLD-MCNC: 15.9 G/DL (ref 12.1–17)
IMM GRANULOCYTES # BLD AUTO: 0 K/UL (ref 0–0.04)
IMM GRANULOCYTES NFR BLD AUTO: 0 % (ref 0–0.5)
INR PPP: 1 (ref 0.9–1.1)
LDLC SERPL CALC-MCNC: 128.8 MG/DL (ref 0–100)
LIPASE SERPL-CCNC: 234 U/L (ref 73–393)
LYMPHOCYTES # BLD: 5.1 K/UL (ref 0.8–3.5)
LYMPHOCYTES NFR BLD: 35 % (ref 12–49)
MCH RBC QN AUTO: 28 PG (ref 26–34)
MCHC RBC AUTO-ENTMCNC: 32.5 G/DL (ref 30–36.5)
MCV RBC AUTO: 86.1 FL (ref 80–99)
MONOCYTES # BLD: 0.9 K/UL (ref 0–1)
MONOCYTES NFR BLD: 6 % (ref 5–13)
NEUTS SEG # BLD: 8.4 K/UL (ref 1.8–8)
NEUTS SEG NFR BLD: 58 % (ref 32–75)
NRBC # BLD: 0 K/UL (ref 0–0.01)
NRBC BLD-RTO: 0 PER 100 WBC
PLATELET # BLD AUTO: 250 K/UL (ref 150–400)
PMV BLD AUTO: 9.5 FL (ref 8.9–12.9)
POTASSIUM SERPL-SCNC: 4.2 MMOL/L (ref 3.5–5.1)
PROT SERPL-MCNC: 8 G/DL (ref 6.4–8.2)
PROTHROMBIN TIME: 10.4 SEC (ref 9–11.1)
RBC # BLD AUTO: 5.68 M/UL (ref 4.1–5.7)
SODIUM SERPL-SCNC: 140 MMOL/L (ref 136–145)
THERAPEUTIC RANGE,PTTT: NORMAL SECS (ref 58–77)
TRIGL SERPL-MCNC: 171 MG/DL (ref ?–150)
TROPONIN I SERPL-MCNC: <0.05 NG/ML
VLDLC SERPL CALC-MCNC: 34.2 MG/DL
WBC # BLD AUTO: 14.6 K/UL (ref 4.1–11.1)

## 2021-08-04 PROCEDURE — 80053 COMPREHEN METABOLIC PANEL: CPT

## 2021-08-04 PROCEDURE — 99284 EMERGENCY DEPT VISIT MOD MDM: CPT

## 2021-08-04 PROCEDURE — 65660000000 HC RM CCU STEPDOWN

## 2021-08-04 PROCEDURE — 36415 COLL VENOUS BLD VENIPUNCTURE: CPT

## 2021-08-04 PROCEDURE — 74011000636 HC RX REV CODE- 636: Performed by: EMERGENCY MEDICINE

## 2021-08-04 PROCEDURE — 80061 LIPID PANEL: CPT

## 2021-08-04 PROCEDURE — 85730 THROMBOPLASTIN TIME PARTIAL: CPT

## 2021-08-04 PROCEDURE — 70450 CT HEAD/BRAIN W/O DYE: CPT

## 2021-08-04 PROCEDURE — 85610 PROTHROMBIN TIME: CPT

## 2021-08-04 PROCEDURE — 84484 ASSAY OF TROPONIN QUANT: CPT

## 2021-08-04 PROCEDURE — 4A03X5D MEASUREMENT OF ARTERIAL FLOW, INTRACRANIAL, EXTERNAL APPROACH: ICD-10-PCS | Performed by: INTERNAL MEDICINE

## 2021-08-04 PROCEDURE — 65270000029 HC RM PRIVATE

## 2021-08-04 PROCEDURE — 74011250637 HC RX REV CODE- 250/637: Performed by: STUDENT IN AN ORGANIZED HEALTH CARE EDUCATION/TRAINING PROGRAM

## 2021-08-04 PROCEDURE — 83690 ASSAY OF LIPASE: CPT

## 2021-08-04 PROCEDURE — 85025 COMPLETE CBC W/AUTO DIFF WBC: CPT

## 2021-08-04 PROCEDURE — 93005 ELECTROCARDIOGRAM TRACING: CPT

## 2021-08-04 PROCEDURE — 70498 CT ANGIOGRAPHY NECK: CPT

## 2021-08-04 RX ORDER — ENOXAPARIN SODIUM 100 MG/ML
40 INJECTION SUBCUTANEOUS EVERY 24 HOURS
Status: DISCONTINUED | OUTPATIENT
Start: 2021-08-05 | End: 2021-08-05 | Stop reason: HOSPADM

## 2021-08-04 RX ORDER — ACETAMINOPHEN 325 MG/1
650 TABLET ORAL
Status: DISCONTINUED | OUTPATIENT
Start: 2021-08-04 | End: 2021-08-05 | Stop reason: HOSPADM

## 2021-08-04 RX ORDER — GUAIFENESIN 100 MG/5ML
81 LIQUID (ML) ORAL DAILY
Status: DISCONTINUED | OUTPATIENT
Start: 2021-08-04 | End: 2021-08-05

## 2021-08-04 RX ORDER — ASPIRIN 325 MG
325 TABLET ORAL
Status: COMPLETED | OUTPATIENT
Start: 2021-08-04 | End: 2021-08-04

## 2021-08-04 RX ORDER — ACETAMINOPHEN 650 MG/1
650 SUPPOSITORY RECTAL
Status: DISCONTINUED | OUTPATIENT
Start: 2021-08-04 | End: 2021-08-05 | Stop reason: HOSPADM

## 2021-08-04 RX ADMIN — ASPIRIN 325 MG ORAL TABLET 325 MG: 325 PILL ORAL at 21:17

## 2021-08-04 RX ADMIN — IOPAMIDOL 100 ML: 755 INJECTION, SOLUTION INTRAVENOUS at 19:37

## 2021-08-04 NOTE — ED PROVIDER NOTES
EMERGENCY DEPARTMENT HISTORY AND PHYSICAL EXAM          Date: 8/4/2021  Patient Name: Jose Reese  History of Presenting Illness     Chief Complaint   Patient presents with    Eye Problem     Pt presents with c/o loss of vision to R eye. Per note from vision center pt describes vision loss as a black bubble in his vision lasting up to 5 mins and then vision returns. Started 1 week ago       History Provided By: Patient and Citizen of [place] Intrepretor    HPI: Jose Reese is a 64 y.o. [place] speaking male with hx of HTN and pancreatitis presents from [facility]CP vision due to sudden intermittent R vision loss that has been ongoing for the past 2 months, will more frequent episodes for the past week and with an episode while at the PCP's office today. PCP was sudden for CRAO vs CRVO vs CVA vs TIA and sent to the ED for further evaluation. The pt describes that he suddenly losses vision in the R eye for approx 3-5 minutes and then his vision returns. Sts that he is currently able to see clearly out of his R eye. Denies any injury or trauma. Notes of associated RUE and LLE numbness. Denies any weakness, dizziness, light-headedness, nausea, vomiting, or difficulty ambulating. PCP: Naveen Berrios MD    There are no other complaints, changes, or physical findings at this time. Current Outpatient Medications   Medication Sig Dispense Refill    rosuvastatin (CRESTOR) 10 mg tablet Take 1 Tab by mouth nightly. 30 Tab 2    cefdinir (OMNICEF) 300 mg capsule Take 1 Cap by mouth two (2) times a day. 14 Cap 0    amLODIPine (NORVASC) 5 mg tablet Take 1 Tab by mouth daily. 30 Tab 0    polyethylene glycol (MIRALAX) 17 gram packet Take 1 Packet by mouth daily as needed for Constipation.  30 Packet 0       Past History     Past Medical History:  Past Medical History:   Diagnosis Date    Current smoker     Hypertension     Pancreatitis, chronic (HCC)     Pseudocyst of pancreas     PUD (peptic ulcer disease)        Past Surgical History:  Past Surgical History:   Procedure Laterality Date    HX CHOLECYSTECTOMY      UPPER GI ENDOSCOPY,BIOPSY  8/19/2020         UPPER GI ENDOSCOPY,BIOPSY  8/28/2020            Family History:  Denies    Social History:  Social History     Tobacco Use    Smoking status: Current Every Day Smoker    Smokeless tobacco: Never Used   Substance Use Topics    Alcohol use: Yes     Comment: socially    Drug use: Never       Allergies:  No Known Allergies      Review of Systems   Review of Systems   Constitutional: Negative for fever. HENT: Negative for sore throat. Eyes:        R eye vision loss     Respiratory: Negative for shortness of breath. Cardiovascular: Negative for chest pain. Gastrointestinal: Negative for abdominal pain. Genitourinary: Negative for dysuria. Musculoskeletal: Negative for myalgias. Neurological: Positive for numbness. Negative for headaches. Psychiatric/Behavioral: Negative for confusion. Physical Exam   Physical Exam  Constitutional:       General: He is in acute distress. Appearance: He is ill-appearing and toxic-appearing. HENT:      Head: Normocephalic and atraumatic. Eyes:      Extraocular Movements: Extraocular movements intact. Conjunctiva/sclera: Conjunctivae normal.   Cardiovascular:      Rate and Rhythm: Normal rate and regular rhythm. Pulmonary:      Effort: Pulmonary effort is normal.      Breath sounds: Normal breath sounds. Abdominal:      General: There is no distension. Palpations: Abdomen is soft. Musculoskeletal:         General: No swelling or deformity. Cervical back: Normal range of motion and neck supple. Skin:     General: Skin is warm and dry. Neurological:      Mental Status: He is alert and oriented to person, place, and time. Cranial Nerves: No cranial nerve deficit. Sensory: Sensory deficit (Reported RUE and LLE numbness) present. Motor: No weakness.       Coordination: Coordination normal. Psychiatric:         Mood and Affect: Mood normal.         Behavior: Behavior normal.         Diagnostic Study Results     Labs -     Recent Results (from the past 12 hour(s))   EKG, 12 LEAD, INITIAL    Collection Time: 08/04/21  4:28 PM   Result Value Ref Range    Ventricular Rate 75 BPM    Atrial Rate 75 BPM    P-R Interval 138 ms    QRS Duration 76 ms    Q-T Interval 370 ms    QTC Calculation (Bezet) 413 ms    Calculated P Axis 69 degrees    Calculated R Axis 77 degrees    Calculated T Axis 74 degrees    Diagnosis       Normal sinus rhythm  Septal infarct , age undetermined  When compared with ECG of 31-AUG-2020 21:08,  No significant change was found     CBC WITH AUTOMATED DIFF    Collection Time: 08/04/21  5:39 PM   Result Value Ref Range    WBC 14.6 (H) 4.1 - 11.1 K/uL    RBC 5.68 4.10 - 5.70 M/uL    HGB 15.9 12.1 - 17.0 g/dL    HCT 48.9 36.6 - 50.3 %    MCV 86.1 80.0 - 99.0 FL    MCH 28.0 26.0 - 34.0 PG    MCHC 32.5 30.0 - 36.5 g/dL    RDW 16.1 (H) 11.5 - 14.5 %    PLATELET 397 878 - 373 K/uL    MPV 9.5 8.9 - 12.9 FL    NRBC 0.0 0  WBC    ABSOLUTE NRBC 0.00 0.00 - 0.01 K/uL    NEUTROPHILS 58 32 - 75 %    LYMPHOCYTES 35 12 - 49 %    MONOCYTES 6 5 - 13 %    EOSINOPHILS 1 0 - 7 %    BASOPHILS 0 0 - 1 %    IMMATURE GRANULOCYTES 0 0.0 - 0.5 %    ABS. NEUTROPHILS 8.4 (H) 1.8 - 8.0 K/UL    ABS. LYMPHOCYTES 5.1 (H) 0.8 - 3.5 K/UL    ABS. MONOCYTES 0.9 0.0 - 1.0 K/UL    ABS. EOSINOPHILS 0.2 0.0 - 0.4 K/UL    ABS. BASOPHILS 0.1 0.0 - 0.1 K/UL    ABS. IMM.  GRANS. 0.0 0.00 - 0.04 K/UL    DF AUTOMATED     METABOLIC PANEL, COMPREHENSIVE    Collection Time: 08/04/21  5:39 PM   Result Value Ref Range    Sodium 140 136 - 145 mmol/L    Potassium 4.2 3.5 - 5.1 mmol/L    Chloride 106 97 - 108 mmol/L    CO2 31 21 - 32 mmol/L    Anion gap 3 (L) 5 - 15 mmol/L    Glucose 90 65 - 100 mg/dL    BUN 9 6 - 20 MG/DL    Creatinine 0.92 0.70 - 1.30 MG/DL    BUN/Creatinine ratio 10 (L) 12 - 20      GFR est AA >60 >60 ml/min/1.73m2 GFR est non-AA >60 >60 ml/min/1.73m2    Calcium 9.1 8.5 - 10.1 MG/DL    Bilirubin, total 0.3 0.2 - 1.0 MG/DL    ALT (SGPT) 14 12 - 78 U/L    AST (SGOT) 14 (L) 15 - 37 U/L    Alk. phosphatase 103 45 - 117 U/L    Protein, total 8.0 6.4 - 8.2 g/dL    Albumin 3.6 3.5 - 5.0 g/dL    Globulin 4.4 (H) 2.0 - 4.0 g/dL    A-G Ratio 0.8 (L) 1.1 - 2.2         Radiologic Studies -   MRI BRAIN WO CONT   Final Result   1. Study slightly limited by motion. 2.  No acute intracranial abnormality      DUPLEX CAROTID BILATERAL   Final Result      CT HEAD WO CONT   Final Result   No acute intracranial finding. CTA HEAD NECK W CONT   Final Result   1. 90% right ICA stenosis, tandem lesions. 2. 80% left subclavian artery stenosis. 3. No intracranial large vessel occlusion. CT Results  (Last 48 hours)    None        CXR Results  (Last 48 hours)    None            Medical Decision Making   I am the first provider for this patient. I reviewed the vital signs, available nursing notes, past medical history, past surgical history, family history and social history. Vital Signs-Reviewed the patient's vital signs. Patient Vitals for the past 12 hrs:   Temp Pulse Resp BP SpO2   08/04/21 1624 98.3 °F (36.8 °C) 84 18 119/73 99 %     Pulse Oximetry Analysis - 99% on RA    Cardiac Monitor:   Rate: 84 bpm  The cardiac monitor revealed the following rhythm as interpreted by me: Normal Sinus Rhythm    The cardiac monitor was ordered secondary to the patient's history of stroke symptoms and to monitor the patient for dysrhythmia    ED EKG interpretation:  Rhythm: normal sinus rhythm; and regular . Rate (approx.): 75; Axis: normal; P wave: normal; QRS interval: normal ; ST/T wave: normal; Other findings: normal. This EKG was interpreted by Angela Nguyen M.D. Records Reviewed: Nursing Notes and Old Medical Records    Provider Notes (Medical Decision Making):   DDx: TIA, CVA, CRAO, CRVO, vision loss, paresthesia      64 y.o. [place] speaking male with hx of HTN and pancreatitis presents from [facility]CP vision due to sudden intermittent R vision loss that has been ongoing for the past 2 months, will more frequent episodes for the past week and with an episode while at the PCP's office today. PCP was sudden for CRAO vs CRVO vs CVA vs TIA and sent to the ED for further evaluation. The pt describes that he suddenly losses vision in the R eye for approx 3-5 minutes and then his vision returns. Sts that he is currently able to see clearly out of his R eye. Notes of associated RUE and LLE numbness. Pt is A&Ox3, well appearing, VS stable, with no obvious facial droop, weakness, CN deficit, or abnormal coordination. Pt immediately taken to CT for CT head noncon and CTA head and neck. Concern for TIA. Will obtain basic labs, coags, lipid panel and troponin. Likely admission for TIA work up with neurology consult. ED Course and Progress Notes:   Initial assessment performed. The patients presenting problems have been discussed, and they are in agreement with the care plan formulated and outlined with them. I have encouraged them to ask questions as they arise throughout their visit.         Orders Placed This Encounter    CT HEAD WO CONT    CTA HEAD NECK W CONT    MRI BRAIN WO CONT    CBC WITH AUTOMATED DIFF    METABOLIC PANEL, COMPREHENSIVE    LIPID PANEL    PROTHROMBIN TIME + INR    PTT    TROPONIN I    LIPASE    LIPID PANEL    HEMOGLOBIN A1C    SED RATE (ESR)    C REACTIVE PROTEIN, QT    SAMPLES BEING HELD    URINALYSIS W/ REFLEX CULTURE    IP CONSULT TO NEUROLOGY    OT EVAL AND TREAT    PT EVAL AND TREAT    SLP EVAL AND TREAT    EKG, 12 LEAD, INITIAL    DISCHARGE PATIENT    SCANNED RADIOLOGY RESULTS    iopamidoL (ISOVUE-370) 76 % injection 100 mL    aspirin tablet 325 mg    DISCONTD: acetaminophen (TYLENOL) tablet 650 mg    DISCONTD: acetaminophen (TYLENOL) solution 650 mg    DISCONTD: acetaminophen (TYLENOL) suppository 650 mg    DISCONTD: aspirin chewable tablet 81 mg    DISCONTD: enoxaparin (LOVENOX) injection 40 mg    DISCONTD: nicotine (NICODERM CQ) 14 mg/24 hr patch 1 Patch    DISCONTD: rosuvastatin (CRESTOR) tablet 10 mg    DISCONTD: methylPREDNISolone (Solu-MEDROL) 1 g in 100 mL NS MBP    DISCONTD: rosuvastatin (CRESTOR) tablet 20 mg    DISCONTD: aspirin delayed-release tablet 81 mg    DISCONTD: clopidogreL (PLAVIX) tablet 75 mg    nicotine (NICODERM CQ) 14 mg/24 hr patch    clopidogreL (PLAVIX) 75 mg tab    aspirin delayed-release 81 mg tablet    rosuvastatin (CRESTOR) 20 mg tablet    IP CONSULT TO VASCULAR SURGERY    INITIAL PHYSICIAN ORDER: INPATIENT Neuro/Stroke; Yes; 3. Patient receiving treatment that can only be provided in an inpatient setting (further clarification in H&P documentation)    IP CONSULT TO CASE MANAGEMENT     Medications   iopamidoL (ISOVUE-370) 76 % injection 100 mL (100 mL IntraVENous Given 8/4/21 1937)   aspirin tablet 325 mg (325 mg Oral Given 8/4/21 2117)     Consult Note:  Michael Mart MD spoke with Dr. Allie Kumar  Specialty: Hospitalist  Discussed pt's hx, disposition, and available diagnostic and imaging results. Reviewed care plans. Agree with management and plan thus far. Consultant will evaluate pt. CRITICAL CARE NOTE :  IMPENDING DETERIORATION -Cardiovascular, CNS and Metabolic  ASSOCIATED RISK FACTORS - Dysrhythmia, Metabolic changes, Dehydration, Vascular Compromise and CNS Decompensation  MANAGEMENT- Bedside Assessment and Supervision of Care  INTERPRETATION -  Xrays, CT Scan, ECG, Blood Pressure and Cardiac Output Measures   INTERVENTIONS - hemodynamic mngmt and Neurologic interventions   CASE REVIEW - Hospitalist/Intensivist, Medical Sub-Specialist, Nursing and Family  TREATMENT RESPONSE -Improved  PERFORMED BY - Self    NOTES   :  I personally spent 35 minutes of critical care time with this patient.  This is time spent at this critically ill patient's bedside actively involved in patient care as well as the coordination of care and discussions with the patient's family. This includes time involved in lab review, consultations with specialist, family decision-making, bedside attention and documentation. During this entire length of time I was immediately available to the patient. This does not include time spent on separately reported billable procedures. Critical Care: The reason for providing this level of medical care for this critically-ill patient was due to a critical illness that impaired one or more vital organ systems, such that there was a high probability of imminent or life-threatening deterioration in the patient's condition. This care involved the highest level of preparedness to intervene urgently. This care involved high complexity decision making to assess, manipulate, and support vital system functions, to treat this degree of vital organ system failure, and to prevent further life threatening deterioration of the patients condition requiring frequent assessments and interventions. Mary Silveira MD    ADMIT  Given the patient's current clinical presentation, I have a high level of concern for decompensation if discharged from the emergency department. Patient is being admitted to the hospital.  The results of their tests and reasons for their admission have been discussed with them and/or available family. They convey agreement and understanding for the need to be admitted and for their admission diagnosis. Consultation has been made with the inpatient physician specialist for hospitalization. Diagnosis     Clinical Impression:   1. TIA (transient ischemic attack)    2. Vision loss of right eye    3. Bilateral carotid artery stenosis    4. Left leg numbness    5. Chronic left-sided low back pain with left-sided sciatica    6. Cerebrovascular accident (CVA), unspecified mechanism ([address]    7. Carotid stenosis, right    8. Accelerated hypertension    9. Stroke-like episode    10. Subclavian arterial stenosis ([address]      I personally performed the services described in this documentation on this date 8/4/2021 for Alexandra Mccurdy. I have reviewed and verified that all the information is accurate and complete.  Carina Carver MD

## 2021-08-04 NOTE — ED TRIAGE NOTES
Pt presents with c/o loss of vision to R eye. Per note from vision center pt describes vision loss as a black bubble in his vision lasting up to 5 mins and then vision returns.  Started 1 week ago

## 2021-08-05 ENCOUNTER — APPOINTMENT (OUTPATIENT)
Dept: MRI IMAGING | Age: 61
DRG: 046 | End: 2021-08-05
Attending: INTERNAL MEDICINE
Payer: MEDICAID

## 2021-08-05 ENCOUNTER — APPOINTMENT (OUTPATIENT)
Dept: NON INVASIVE DIAGNOSTICS | Age: 61
DRG: 046 | End: 2021-08-05
Attending: INTERNAL MEDICINE
Payer: MEDICAID

## 2021-08-05 ENCOUNTER — APPOINTMENT (OUTPATIENT)
Dept: VASCULAR SURGERY | Age: 61
DRG: 046 | End: 2021-08-05
Attending: INTERNAL MEDICINE
Payer: MEDICAID

## 2021-08-05 VITALS
RESPIRATION RATE: 16 BRPM | OXYGEN SATURATION: 99 % | DIASTOLIC BLOOD PRESSURE: 75 MMHG | BODY MASS INDEX: 19.9 KG/M2 | HEIGHT: 70 IN | SYSTOLIC BLOOD PRESSURE: 142 MMHG | WEIGHT: 139 LBS | TEMPERATURE: 98.4 F | HEART RATE: 75 BPM

## 2021-08-05 LAB
APPEARANCE UR: CLEAR
ATRIAL RATE: 75 BPM
BACTERIA URNS QL MICRO: NEGATIVE /HPF
BILIRUB UR QL: NEGATIVE
CALCULATED P AXIS, ECG09: 69 DEGREES
CALCULATED R AXIS, ECG10: 77 DEGREES
CALCULATED T AXIS, ECG11: 74 DEGREES
CHOLEST SERPL-MCNC: 220 MG/DL
COLOR UR: NORMAL
COMMENT, HOLDF: NORMAL
CRP SERPL-MCNC: 0.47 MG/DL (ref 0–0.6)
DIAGNOSIS, 93000: NORMAL
EPITH CASTS URNS QL MICRO: NORMAL /LPF
ERYTHROCYTE [SEDIMENTATION RATE] IN BLOOD: 6 MM/HR (ref 0–20)
EST. AVERAGE GLUCOSE BLD GHB EST-MCNC: 123 MG/DL
GLUCOSE UR STRIP.AUTO-MCNC: NEGATIVE MG/DL
HBA1C MFR BLD: 5.9 % (ref 4–5.6)
HDLC SERPL-MCNC: 49 MG/DL
HDLC SERPL: 4.5 {RATIO} (ref 0–5)
HGB UR QL STRIP: NEGATIVE
HYALINE CASTS URNS QL MICRO: NORMAL /LPF (ref 0–5)
KETONES UR QL STRIP.AUTO: NEGATIVE MG/DL
LDLC SERPL CALC-MCNC: 142.4 MG/DL (ref 0–100)
LEFT CCA DIST DIAS: 25.5 CM/S
LEFT CCA DIST SYS: 113.2 CM/S
LEFT CCA PROX DIAS: 25.5 CM/S
LEFT CCA PROX SYS: 86.9 CM/S
LEFT ECA DIAS: 16.7 CM/S
LEFT ECA SYS: 165.9 CM/S
LEFT ICA DIST DIAS: 23.3 CM/S
LEFT ICA DIST SYS: 82.5 CM/S
LEFT ICA MID DIAS: 25.5 CM/S
LEFT ICA MID SYS: 65 CM/S
LEFT ICA PROX DIAS: 23.3 CM/S
LEFT ICA PROX SYS: 113.2 CM/S
LEFT ICA/CCA SYS: 1
LEFT SUBCLAVIAN DIAS: 0 CM/S
LEFT SUBCLAVIAN SYS: 139.6 CM/S
LEFT VERTEBRAL DIAS: 13.6 CM/S
LEFT VERTEBRAL SYS: 13.8 CM/S
LEUKOCYTE ESTERASE UR QL STRIP.AUTO: NEGATIVE
NITRITE UR QL STRIP.AUTO: NEGATIVE
P-R INTERVAL, ECG05: 138 MS
PH UR STRIP: 5.5 [PH] (ref 5–8)
PROT UR STRIP-MCNC: NEGATIVE MG/DL
Q-T INTERVAL, ECG07: 370 MS
QRS DURATION, ECG06: 76 MS
QTC CALCULATION (BEZET), ECG08: 413 MS
RBC #/AREA URNS HPF: NORMAL /HPF (ref 0–5)
RIGHT CCA DIST DIAS: 23 CM/S
RIGHT CCA DIST SYS: 60.3 CM/S
RIGHT CCA PROX DIAS: 14.2 CM/S
RIGHT CCA PROX SYS: 46 CM/S
RIGHT ECA DIAS: 71 CM/S
RIGHT ECA SYS: 382.2 CM/S
RIGHT ICA DIST DIAS: 30.5 CM/S
RIGHT ICA DIST SYS: 59.8 CM/S
RIGHT ICA MID DIAS: 87.5 CM/S
RIGHT ICA MID SYS: 205.1 CM/S
RIGHT ICA PROX DIAS: 126.7 CM/S
RIGHT ICA PROX SYS: 448.3 CM/S
RIGHT ICA/CCA SYS: 7.4
RIGHT SUBCLAVIAN DIAS: 0 CM/S
RIGHT SUBCLAVIAN SYS: 153.4 CM/S
RIGHT VERTEBRAL DIAS: 35.5 CM/S
RIGHT VERTEBRAL SYS: 94.8 CM/S
SAMPLES BEING HELD,HOLD: NORMAL
SP GR UR REFRACTOMETRY: 1.01 (ref 1–1.03)
TRIGL SERPL-MCNC: 143 MG/DL (ref ?–150)
UA: UC IF INDICATED,UAUC: NORMAL
UROBILINOGEN UR QL STRIP.AUTO: 0.2 EU/DL (ref 0.2–1)
VENTRICULAR RATE, ECG03: 75 BPM
VLDLC SERPL CALC-MCNC: 28.6 MG/DL
WBC URNS QL MICRO: NORMAL /HPF (ref 0–4)

## 2021-08-05 PROCEDURE — 70551 MRI BRAIN STEM W/O DYE: CPT

## 2021-08-05 PROCEDURE — 97161 PT EVAL LOW COMPLEX 20 MIN: CPT

## 2021-08-05 PROCEDURE — 74011000258 HC RX REV CODE- 258: Performed by: INTERNAL MEDICINE

## 2021-08-05 PROCEDURE — 85652 RBC SED RATE AUTOMATED: CPT

## 2021-08-05 PROCEDURE — 97535 SELF CARE MNGMENT TRAINING: CPT

## 2021-08-05 PROCEDURE — 74011250637 HC RX REV CODE- 250/637: Performed by: INTERNAL MEDICINE

## 2021-08-05 PROCEDURE — 74011250636 HC RX REV CODE- 250/636: Performed by: INTERNAL MEDICINE

## 2021-08-05 PROCEDURE — 80061 LIPID PANEL: CPT

## 2021-08-05 PROCEDURE — 93306 TTE W/DOPPLER COMPLETE: CPT

## 2021-08-05 PROCEDURE — 86140 C-REACTIVE PROTEIN: CPT

## 2021-08-05 PROCEDURE — 83036 HEMOGLOBIN GLYCOSYLATED A1C: CPT

## 2021-08-05 PROCEDURE — 93880 EXTRACRANIAL BILAT STUDY: CPT

## 2021-08-05 PROCEDURE — 97116 GAIT TRAINING THERAPY: CPT

## 2021-08-05 PROCEDURE — 74011250637 HC RX REV CODE- 250/637: Performed by: SURGERY

## 2021-08-05 PROCEDURE — 97165 OT EVAL LOW COMPLEX 30 MIN: CPT

## 2021-08-05 PROCEDURE — 36415 COLL VENOUS BLD VENIPUNCTURE: CPT

## 2021-08-05 PROCEDURE — 93880 EXTRACRANIAL BILAT STUDY: CPT | Performed by: PSYCHIATRY & NEUROLOGY

## 2021-08-05 PROCEDURE — 99223 1ST HOSP IP/OBS HIGH 75: CPT | Performed by: PSYCHIATRY & NEUROLOGY

## 2021-08-05 PROCEDURE — 81001 URINALYSIS AUTO W/SCOPE: CPT

## 2021-08-05 PROCEDURE — 93306 TTE W/DOPPLER COMPLETE: CPT | Performed by: INTERNAL MEDICINE

## 2021-08-05 RX ORDER — ASPIRIN 81 MG/1
81 TABLET ORAL DAILY
Status: DISCONTINUED | OUTPATIENT
Start: 2021-08-05 | End: 2021-08-05 | Stop reason: HOSPADM

## 2021-08-05 RX ORDER — IBUPROFEN 200 MG
1 TABLET ORAL DAILY
Qty: 30 PATCH | Refills: 0 | Status: SHIPPED | OUTPATIENT
Start: 2021-08-06 | End: 2021-09-05

## 2021-08-05 RX ORDER — IBUPROFEN 200 MG
1 TABLET ORAL DAILY
Status: DISCONTINUED | OUTPATIENT
Start: 2021-08-05 | End: 2021-08-05 | Stop reason: HOSPADM

## 2021-08-05 RX ORDER — ROSUVASTATIN CALCIUM 20 MG/1
20 TABLET, COATED ORAL
Qty: 30 TABLET | Refills: 0 | Status: SHIPPED | OUTPATIENT
Start: 2021-08-05 | End: 2021-11-18 | Stop reason: SDUPTHER

## 2021-08-05 RX ORDER — CLOPIDOGREL BISULFATE 75 MG/1
75 TABLET ORAL DAILY
Status: DISCONTINUED | OUTPATIENT
Start: 2021-08-05 | End: 2021-08-05 | Stop reason: HOSPADM

## 2021-08-05 RX ORDER — ASPIRIN 81 MG/1
81 TABLET ORAL DAILY
Qty: 30 TABLET | Refills: 0 | Status: SHIPPED | OUTPATIENT
Start: 2021-08-05 | End: 2021-11-18 | Stop reason: SDUPTHER

## 2021-08-05 RX ORDER — ROSUVASTATIN CALCIUM 20 MG/1
20 TABLET, COATED ORAL
Status: DISCONTINUED | OUTPATIENT
Start: 2021-08-05 | End: 2021-08-05 | Stop reason: HOSPADM

## 2021-08-05 RX ORDER — ROSUVASTATIN CALCIUM 5 MG/1
10 TABLET, COATED ORAL
Status: DISCONTINUED | OUTPATIENT
Start: 2021-08-05 | End: 2021-08-05

## 2021-08-05 RX ORDER — CLOPIDOGREL BISULFATE 75 MG/1
75 TABLET ORAL DAILY
Qty: 75 TABLET | Refills: 0 | Status: SHIPPED | OUTPATIENT
Start: 2021-08-06 | End: 2021-11-18 | Stop reason: SDUPTHER

## 2021-08-05 RX ADMIN — CLOPIDOGREL BISULFATE 75 MG: 75 TABLET ORAL at 16:46

## 2021-08-05 RX ADMIN — ROSUVASTATIN CALCIUM 10 MG: 5 TABLET, COATED ORAL at 05:48

## 2021-08-05 RX ADMIN — ASPIRIN 81 MG: 81 TABLET, COATED ORAL at 16:46

## 2021-08-05 RX ADMIN — ENOXAPARIN SODIUM 40 MG: 40 INJECTION SUBCUTANEOUS at 00:34

## 2021-08-05 RX ADMIN — SODIUM CHLORIDE 1000 MG: 900 INJECTION INTRAVENOUS at 09:27

## 2021-08-05 NOTE — PROGRESS NOTES

## 2021-08-05 NOTE — H&P
Hospitalist Admission Note    NAME: Lamar Quintana   :  1960   MRN:  [id number]     Date/Time:  2021 5:13 AM    Patient PCP: Aristides Bansal MD  ______________________________________________________________________  Given the patient's current clinical presentation, I have a high level of concern for decompensation if discharged from the emergency department. Complex decision making was performed, which includes reviewing the patient's available past medical records, laboratory results, and x-ray films. My assessment of this patient's clinical condition and my plan of care is as follows. Assessment / Plan:  Acute TIA/CVA  Transient right-sided visual loss  Right-sided carotid stenosis  ? GCA vs carotid disease from atherosclerotic disease  -Admit to neuro/telemetry  -Received full dose aspirin in the ED  -Continue ASA 81 mg daily  -Denies headache, and ESR normal (CRP pending) which go against GCA but with ipsilateral jaw claudication and visual loss will start pulse dose IV methylprednisolone as the diagnosis can't be excluded and could have catastrophic consequences  -In-house neurology consult  -Stroke labs: A1c, lipids  -Continue statin  -CT head negative for acute process  -CTA head/neck with left subclavian stenosis and 90% right ICA stenosis  -Vascular surgery consult  -consideration of temporal artery Bx pending vascular surg and neuro evaluations in the AM  -PT/OT/SLP eval as per protocol  -Risk reduction with optimization of BP control, smoking cessation, which was discussed with the patient extensively through   -Check MRI brain  -TTE to evaluate for intracardiac thrombus    CTA head/neck:  1. 90% right ICA stenosis, tandem lesions. 2. 80% left subclavian artery stenosis. 3. No intracranial large vessel occlusion.     HTN  -Hold amlodipine for now, permissive hypertension    HLD  -Continue statin  -Check lipid panel as above    Tobacco abuse  -Patient reports active 1/3 PPD smoking  -Counseled on cessation as above  -Nicotine replacement in hospital    Hx of EtOH abuse  -Denies any use over the last few years    Code Status: Full code  Surrogate Decision Maker: Brother Jason Walsh [id number]    DVT Prophylaxis: Lovenox  GI Prophylaxis: not indicated    Baseline: Independent ADLs      Subjective:   CHIEF COMPLAINT: Transient right-sided visual loss    HISTORY OF PRESENT ILLNESS:     Keyanna Schofield is a 64 y.o.  gentleman with past medical history of HTN, HLD, history of EtOH abuse, and tobacco abuse who presents to the ED with intermittent transient right-sided visual loss which has been going on since March of this year. Patient has episodes where the right eye will \"go black\" for periods of 3-5 minutes. He last experienced such an episode on Monday and denies any current visual symptoms. Patient denies any headache, has experienced episodic nausea, denies vomiting. Patient does admit some soreness with chewing food at the upper right jaw and indicates the preauricular area on that side. No tenderness along temporal artery, patient denies shoulder or hip girdle pain/tenderness. He does also admit some left lower extremity numbness and B/L lower extremity claudication. We were asked to admit for work up and evaluation of the above problems. Past Medical History:   Diagnosis Date    Current smoker     Hypertension     Pancreatitis, chronic (HCC)     Pseudocyst of pancreas     PUD (peptic ulcer disease)         Past Surgical History:   Procedure Laterality Date    HX CHOLECYSTECTOMY      UPPER GI ENDOSCOPY,BIOPSY  8/19/2020         UPPER GI ENDOSCOPY,BIOPSY  8/28/2020            Social History     Tobacco Use    Smoking status: Current Every Day Smoker    Smokeless tobacco: Never Used   Substance Use Topics    Alcohol use: Yes     Comment: socially        No family history on file.   No Known Allergies     Prior to Admission medications Medication Sig Start Date End Date Taking? Authorizing Provider   rosuvastatin (CRESTOR) 10 mg tablet Take 1 Tab by mouth nightly. 9/13/20   Saige Denton MD   cefdinir (OMNICEF) 300 mg capsule Take 1 Cap by mouth two (2) times a day. 9/8/20   Jony Cedeño MD   amLODIPine (NORVASC) 5 mg tablet Take 1 Tab by mouth daily. 9/9/20   Jony Cedeño MD   polyethylene glycol (MIRALAX) 17 gram packet Take 1 Packet by mouth daily as needed for Constipation. 9/8/20   Jony Cedeño MD       REVIEW OF SYSTEMS:     I am not able to complete the review of systems because:    The patient is intubated and sedated    The patient has altered mental status due to his acute medical problems    The patient has baseline aphasia from prior stroke(s)    The patient has baseline dementia and is not reliable historian    The patient is in acute medical distress and unable to provide information           Total of 12 systems reviewed as follows:       POSITIVE= underlined text  Negative = text not underlined  General:  fever, chills, sweats, generalized weakness, weight loss/gain,      loss of appetite   Eyes:    blurred vision, eye pain, loss of vision, double vision  ENT:    rhinorrhea, pharyngitis   Respiratory:   cough, sputum production, SOB, GARZA, wheezing, pleuritic pain   Cardiology:   chest pain, palpitations, orthopnea, PND, edema, syncope   Gastrointestinal:  abdominal pain , N/V, diarrhea, dysphagia, constipation, bleeding   Genitourinary:  frequency, urgency, dysuria, hematuria, incontinence   Muskuloskeletal :  arthralgia, myalgia, back pain  Hematology:  easy bruising, nose or gum bleeding, lymphadenopathy   Dermatological: rash, ulceration, pruritis, color change / jaundice  Endocrine:   hot flashes or polydipsia   Neurological:  headache, dizziness, confusion, focal weakness, paresthesia,     Speech difficulties, memory loss, gait difficulty  Psychological: Feelings of anxiety, depression, agitation    Objective: VITALS:    Visit Vitals  /62   Pulse 69   Temp 98.2 °F (36.8 °C)   Resp 18   Ht 5' 10\" (1.778 m)   Wt 63.3 kg (139 lb 8.8 oz)   SpO2 98%   BMI 20.02 kg/m²       PHYSICAL EXAM:    General:    Alert, cooperative, no distress, appears stated age. HEENT: Atraumatic, anicteric sclerae, pink conjunctivae     No oral ulcers, mucosa moist, throat clear, dentition fair  Neck:  Supple, symmetrical,  thyroid: non tender  Lungs:   Clear to auscultation bilaterally. No Wheezing or Rhonchi. No rales. Chest wall:  No tenderness  No Accessory muscle use. Heart:   Regular  rhythm,  No  murmur   No edema  Abdomen:   Soft, non-tender. Not distended. Bowel sounds normal  Extremities: No cyanosis. No clubbing,      Skin turgor normal, Capillary refill normal, Radial dial pulse 2+  Skin:     Not pale. Not Jaundiced  No rashes   Psych:  Not depressed. Not anxious or agitated. Neurologic: EOMs intact. No facial asymmetry. No aphasia or slurred speech. Symmetrical strength, Sensation grossly intact.  Alert and oriented X 4.     _______________________________________________________________________  Care Plan discussed with:    Comments   Patient x    Family      RN x    Care Manager                    Consultant:  miya HILL MD   _______________________________________________________________________  Expected  Disposition:   Home with Family x   HH/PT/OT/RN x   SNF/LTC    TE    ________________________________________________________________________  TOTAL TIME: 76 Minutes    Critical Care Provided     Minutes non procedure based      Comments    x Reviewed previous records   >50% of visit spent in counseling and coordination of care x Discussion with patient and/or family and questions answered       ________________________________________________________________________  Signed: Kulwinder Quarles DO    Procedures: see electronic medical records for all procedures/Xrays and details which were not copied into this note but were reviewed prior to creation of Plan. LAB DATA REVIEWED:    Recent Results (from the past 24 hour(s))   EKG, 12 LEAD, INITIAL    Collection Time: 08/04/21  4:28 PM   Result Value Ref Range    Ventricular Rate 75 BPM    Atrial Rate 75 BPM    P-R Interval 138 ms    QRS Duration 76 ms    Q-T Interval 370 ms    QTC Calculation (Bezet) 413 ms    Calculated P Axis 69 degrees    Calculated R Axis 77 degrees    Calculated T Axis 74 degrees    Diagnosis       Normal sinus rhythm  Septal infarct , age undetermined  When compared with ECG of 31-AUG-2020 21:08,  No significant change was found     CBC WITH AUTOMATED DIFF    Collection Time: 08/04/21  5:39 PM   Result Value Ref Range    WBC 14.6 (H) 4.1 - 11.1 K/uL    RBC 5.68 4.10 - 5.70 M/uL    HGB 15.9 12.1 - 17.0 g/dL    HCT 48.9 36.6 - 50.3 %    MCV 86.1 80.0 - 99.0 FL    MCH 28.0 26.0 - 34.0 PG    MCHC 32.5 30.0 - 36.5 g/dL    RDW 16.1 (H) 11.5 - 14.5 %    PLATELET 139 457 - 117 K/uL    MPV 9.5 8.9 - 12.9 FL    NRBC 0.0 0  WBC    ABSOLUTE NRBC 0.00 0.00 - 0.01 K/uL    NEUTROPHILS 58 32 - 75 %    LYMPHOCYTES 35 12 - 49 %    MONOCYTES 6 5 - 13 %    EOSINOPHILS 1 0 - 7 %    BASOPHILS 0 0 - 1 %    IMMATURE GRANULOCYTES 0 0.0 - 0.5 %    ABS. NEUTROPHILS 8.4 (H) 1.8 - 8.0 K/UL    ABS. LYMPHOCYTES 5.1 (H) 0.8 - 3.5 K/UL    ABS. MONOCYTES 0.9 0.0 - 1.0 K/UL    ABS. EOSINOPHILS 0.2 0.0 - 0.4 K/UL    ABS. BASOPHILS 0.1 0.0 - 0.1 K/UL    ABS. IMM.  GRANS. 0.0 0.00 - 0.04 K/UL    DF AUTOMATED     METABOLIC PANEL, COMPREHENSIVE    Collection Time: 08/04/21  5:39 PM   Result Value Ref Range    Sodium 140 136 - 145 mmol/L    Potassium 4.2 3.5 - 5.1 mmol/L    Chloride 106 97 - 108 mmol/L    CO2 31 21 - 32 mmol/L    Anion gap 3 (L) 5 - 15 mmol/L    Glucose 90 65 - 100 mg/dL    BUN 9 6 - 20 MG/DL    Creatinine 0.92 0.70 - 1.30 MG/DL    BUN/Creatinine ratio 10 (L) 12 - 20      GFR est AA >60 >60 ml/min/1.73m2    GFR est non-AA >60 >60 ml/min/1.73m2    Calcium 9.1 8.5 - 10.1 MG/DL    Bilirubin, total 0.3 0.2 - 1.0 MG/DL    ALT (SGPT) 14 12 - 78 U/L    AST (SGOT) 14 (L) 15 - 37 U/L    Alk. phosphatase 103 45 - 117 U/L    Protein, total 8.0 6.4 - 8.2 g/dL    Albumin 3.6 3.5 - 5.0 g/dL    Globulin 4.4 (H) 2.0 - 4.0 g/dL    A-G Ratio 0.8 (L) 1.1 - 2.2     LIPID PANEL    Collection Time: 08/04/21  6:47 PM   Result Value Ref Range    Cholesterol, total 213 (H) <200 MG/DL    Triglyceride 171 (H) <150 MG/DL    HDL Cholesterol 50 MG/DL    LDL, calculated 128.8 (H) 0 - 100 MG/DL    VLDL, calculated 34.2 MG/DL    CHOL/HDL Ratio 4.3 0.0 - 5.0     PROTHROMBIN TIME + INR    Collection Time: 08/04/21  6:47 PM   Result Value Ref Range    INR 1.0 0.9 - 1.1      Prothrombin time 10.4 9.0 - 11.1 sec   PTT    Collection Time: 08/04/21  6:47 PM   Result Value Ref Range    aPTT 27.1 22.1 - 31.0 sec    aPTT, therapeutic range     58.0 - 77.0 SECS   TROPONIN I    Collection Time: 08/04/21  6:47 PM   Result Value Ref Range    Troponin-I, Qt. <0.05 <0.05 ng/mL   LIPASE    Collection Time: 08/04/21  6:47 PM   Result Value Ref Range    Lipase 234 73 - 393 U/L   SED RATE (ESR)    Collection Time: 08/05/21 12:35 AM   Result Value Ref Range    Sed rate, automated 6 0 - 20 mm/hr   SAMPLES BEING HELD    Collection Time: 08/05/21 12:35 AM   Result Value Ref Range    SAMPLES BEING HELD RD SST     COMMENT        Add-on orders for these samples will be processed based on acceptable specimen integrity and analyte stability, which may vary by analyte.

## 2021-08-05 NOTE — PROGRESS NOTES
Hospitalist Progress Note    NAME: Annika Wilson   :  1960   MRN:  [id number]       Assessment / Plan:  Acute visual dysfunction likely TIA  Acute CVA ruled out MRI is negative    Critical right-sided carotid stenosis  Vascular consulted pending. Continue aspirin 81 mg daily    High intensity statin therapy will be continued    Follow-up with echocardiogram  Essential hypertension continue blood pressure medications monitor patient blood pressure closely it does not appear to be CVA since MRI is negative also neurology consult appreciated. Nicotine abuse disorder we will continue nicotine patch  Nicotine replacement therapy counseling. Counseling   History of EtOH     Body mass index is 19.94 kg/m². Estimated discharge date: 2021  tatus: Full code medical stability  Prophylaxis: Lovenox subcu  Recommended Disposition: Home with family     Subjective:     Chief Complaint / Reason for Physician Visit    Discussed with RN events overnight. Patient seen and examined at bedside no new changes doing much better. Review of Systems:  Symptom Y/N Comments  Symptom Y/N Comments   Fever/Chills x   Chest Pain x    Poor Appetite x   Edema x    Cough x   Abdominal Pain     Sputum x   Joint Pain     SOB/GARZA x   Pruritis/Rash     Nausea/vomit x   Tolerating PT/OT     Diarrhea x   Tolerating Diet     Constipation x   Other       Could NOT obtain due to:      Objective:     VITALS:   Last 24hrs VS reviewed since prior progress note.  Most recent are:  Patient Vitals for the past 24 hrs:   Temp Pulse Resp BP SpO2   21 1439 98.4 °F (36.9 °C) 75 16 (!) 142/75 99 %   21 1241    (!) 159/88    21 1238  74  (!) 143/71    21 1147 98.3 °F (36.8 °C) 72 18 (!) 192/73 98 %   21 0806    (!) 142/77    21 0722 97.6 °F (36.4 °C) 82 18 (!) 142/77 97 %   21 0323 98.2 °F (36.8 °C) 69 18 135/62 98 %   21 0050 98 °F (36.7 °C) 75 20 (!) 155/79 100 % 08/05/21 0015  72 20 139/65 96 %   08/04/21 2145  69 22 (!) 153/80 97 %   08/04/21 1624 98.3 °F (36.8 °C) 84 18 119/73 99 %     No intake or output data in the 24 hours ending 08/05/21 1530     I had a face to face encounter and independently examined this patient on 8/5/2021, as outlined below:  PHYSICAL EXAM:  General: WD, WN. Alert, cooperative, no acute distress    EENT:  EOMI. Anicteric sclerae. MMM  Resp:  CTA bilaterally, no wheezing or rales. No accessory muscle use  CV:  Regular  rhythm,  No edema  GI:  Soft, Non distended, Non tender. +Bowel sounds  Neurologic:  Alert and oriented X 3, normal speech,   Psych:   Good insight. Not anxious nor agitated  Skin:  No rashes. No jaundice    Reviewed most current lab test results and cultures  YES  Reviewed most current radiology test results   YES  Review and summation of old records today    NO  Reviewed patient's current orders and MAR    YES  PMH/ reviewed - no change compared to H&P  ________________________________________________________________________  Care Plan discussed with:    Comments   Patient x    Family      RN x    Care Manager     Consultant                        Multidiciplinary team rounds were held today with , nursing, pharmacist and clinical coordinator. Patient's plan of care was discussed; medications were reviewed and discharge planning was addressed. ________________________________________________________________________  Total NON critical care TIME:     Total CRITICAL CARE TIME Spent:   Minutes non procedure based      Comments   >50% of visit spent in counseling and coordination of care     ________________________________________________________________________  Dexter Kearns MD     Procedures: see electronic medical records for all procedures/Xrays and details which were not copied into this note but were reviewed prior to creation of Plan.       LABS:  I reviewed today's most current labs and imaging studies.   Pertinent labs include:  Recent Labs     08/04/21  1739   WBC 14.6*   HGB 15.9   HCT 48.9        Recent Labs     08/04/21  1847 08/04/21  1739   NA  --  140   K  --  4.2   CL  --  106   CO2  --  31   GLU  --  90   BUN  --  9   CREA  --  0.92   CA  --  9.1   ALB  --  3.6   TBILI  --  0.3   ALT  --  14   INR 1.0  --        Signed: Lorraine Meléndez MD

## 2021-08-05 NOTE — CONSULTS
Neurology Note    Patient ID:  Sarah Barreto  [id number]  5 y.o.  1960      Date of Consultation:  August 5, 2021    Referring Physician: Dr Alan Lynne    Reason for Consultation:  Vision loss      Assessment and Plan:    The patient is a pleasant 82-year-old female with history of smoking, tension, and dyslipidemia who presents with intermittent vision loss in his right eye. His MRI revealed no evidence of an acute stroke. CTA revealed significant carotid stenosis. 1. Intermittent vision loss:    MRI reveals no evidence of acute stroke. These are most likely TIAs. He does have significant carotid stenosis on the right. Vascular surgery consult is pending. He should be at least aspirin daily. He was not on any antiplatelets prior to hospitalization  Dyslipidemia: Please continue aggressive lipid-lowering therapy. Hypertension: Goal systolic blood pressure under 140  I did educate the patient today on the importance of smoking cessation. I do not see a need for high-dose steroids from a neurological perspective. 2. Intermittent left leg numbness associated with walking and back pain:    Differential includes lumbar radiculopathy versus musculoskeletal in origin. This can be worked up as an outpatient to include an EMG/nerve conduction study and stable imaging of the spine. There is no other additional neurology recommendations at this time. If questions arise, please not hesitate to contact me and I will return to see the patient. The patient should follow-up in clinic in 3 to 4 weeks time after discharge. Subjective:i kept losing my vision       History of Present Illness:   Sarah Barreto is a 64 y.o. male with a history of hypertension, dyslipidemia, alcohol abuse and tobacco abuse who presented to the emergency department after developing intermittent vision loss in his right eye over the past couple months that has increased in frequency.   The patient noticed that his vision will go black for a period of 3 to 5 minutes. He denied any headache. There is no nausea or vomiting. He was started on IVs steroids for possible giant cell arteritis. History was obtained through an . It states that his vision goes black in the right eye a few times every week. No pain associated with this. No numbness or tingling or weakness. No facial weakness    He does also complain of intermittent numbness and tingling in his left lower extremity when walking for an extended period of time. No weakness. Pain does goes up and into his back. Past Medical History:   Diagnosis Date    Current smoker     Hypertension     Pancreatitis, chronic (HCC)     Pseudocyst of pancreas     PUD (peptic ulcer disease)         Past Surgical History:   Procedure Laterality Date    HX CHOLECYSTECTOMY      UPPER GI ENDOSCOPY,BIOPSY  8/19/2020         UPPER GI ENDOSCOPY,BIOPSY  8/28/2020             Family history:    No history of stroke at a young age    Social History     Tobacco Use    Smoking status: Current Every Day Smoker    Smokeless tobacco: Never Used   Substance Use Topics    Alcohol use: Yes     Comment: socially        No Known Allergies     Prior to Admission medications    Medication Sig Start Date End Date Taking? Authorizing Provider   rosuvastatin (CRESTOR) 10 mg tablet Take 1 Tab by mouth nightly. 9/13/20   Carmelita Denton MD   cefdinir (OMNICEF) 300 mg capsule Take 1 Cap by mouth two (2) times a day. 9/8/20   Elinor West MD   amLODIPine (NORVASC) 5 mg tablet Take 1 Tab by mouth daily. 9/9/20   Elinor West MD   polyethylene glycol (MIRALAX) 17 gram packet Take 1 Packet by mouth daily as needed for Constipation.  9/8/20   Elinor West MD     Current Facility-Administered Medications   Medication Dose Route Frequency    nicotine (NICODERM CQ) 14 mg/24 hr patch 1 Patch  1 Patch TransDERmal DAILY    methylPREDNISolone (Solu-MEDROL) 1 g in 100 mL NS MBP  1,000 mg IntraVENous DAILY    rosuvastatin (CRESTOR) tablet 20 mg  20 mg Oral QHS    aspirin delayed-release tablet 81 mg  81 mg Oral DAILY    acetaminophen (TYLENOL) tablet 650 mg  650 mg Oral Q4H PRN    Or    acetaminophen (TYLENOL) solution 650 mg  650 mg Per NG tube Q4H PRN    Or    acetaminophen (TYLENOL) suppository 650 mg  650 mg Rectal Q4H PRN    enoxaparin (LOVENOX) injection 40 mg  40 mg SubCUTAneous Q24H       Review of Systems:    General, constitutional: negative  Eyes, vision: negative  Ears, nose, throat: negative  Cardiovascular, heart: negative  Respiratory: negative  Gastrointestinal: negative  Genitourinary: negative  Musculoskeletal: left leg intermittent numbness with walking  Skin and integumentary: negative  Psychiatric: negative  Endocrine: negative  Neurological: negative, except for HPI  Hematologic/lymphatic: negative  Allergy/immunology: negative      Objective:     Visit Vitals  /62   Pulse 69   Temp 98.2 °F (36.8 °C)   Resp 18   Ht 5' 10\" (1.778 m)   Wt 139 lb 8.8 oz (63.3 kg)   SpO2 98%   BMI 20.02 kg/m²       Physical Exam:      General:  appears well nourished in no acute distress  Neck:  carotid bruits  Lungs: clear to auscultation  Heart:  no murmurs, regular rate  Lower extremity: peripheral pulses palpable and no edema  Skin: intact. Nail clubbing    Neurological exam:    Awake, alert, oriented to person, place and time  Recent and remote memory were normal  Attention and concentration were intact  Language was intact. There was no aphasia  Speech: no dysarthria  Fund of knowledge was preserved  Communication via .     Cranial nerves:   II-XII were tested    Perrrla  Fundoscopic examination revealed venous pulsations and no clear abnormalities  Visual fields were full  Eomi, no evidence of nystagmus  Facial sensation:  normal and symmetric  Facial motor: normal and symmetric  Hearing intact  SCM strength intact  Tongue: midline without fasciculations    Motor: Tone normal  Pronator drift was absent  No evidence of fasciculations    Strength testing:   deltoid triceps biceps Wrist ext. Wrist flex. intrinsics Hip flex. Hip ext. Knee ext. Knee flex Dorsi flex Plantar flex   Right 5 5 5 5 5 5 5 5 5 5 5 5   Left 5 5 5 5 5 5 5 5 5 5 5 5         Sensory:  Upper extremity: intact to pp, light touch, and vibration > 10 seconds  Lower extremity: pp decreased in left lower extremity (predominantly L5)    Reflexes:    Right Left  Biceps  2 2  Triceps 2 2  Brachiorad. 2 2  Patella  2 2  Achilles 1 1    Plantar response:  flexor bilaterally      Cerebellar testing:  no tremor apparent, finger/nose and maddie were intact      Labs:     Lab Results   Component Value Date/Time    Hemoglobin A1c 6.0 (H) 09/11/2020 12:26 PM    Sodium 140 08/04/2021 05:39 PM    Potassium 4.2 08/04/2021 05:39 PM    Chloride 106 08/04/2021 05:39 PM    Glucose 90 08/04/2021 05:39 PM    BUN 9 08/04/2021 05:39 PM    Creatinine 0.92 08/04/2021 05:39 PM    Calcium 9.1 08/04/2021 05:39 PM    WBC 14.6 (H) 08/04/2021 05:39 PM    HCT 48.9 08/04/2021 05:39 PM    HGB 15.9 08/04/2021 05:39 PM    PLATELET 656 52/41/5473 05:39 PM       Imaging:    Results from Hospital Encounter encounter on 08/13/20    MRI ABD W MRCP W WO CONT    Narrative    MRI Abdomen shows duodenal intramural fluid without enhancement. No  choledocholithiasis. Preliminary report was provided by Dr. Bailey Samano, the on-call radiologist, at 2016  hours. Final report to follow. EXAM:  MRI ABD W MRCP W WO CONT    INDICATION: Duodenal pathology with intramural fluid. Evaluate for ampullary  mass. No ampullary mass on endoscopic ultrasound in December, 2019. Clinical  diagnosis of duodenal perforation with sterile intramural fluid collection. COMPARISON: CT abdomen on 8/17/2020 and 8/13/2020. TECHNIQUE: Coronal T2 and postcontrast T1; Axial T2, in/out of phase, MRCP, pre-  and dynamic postcontrast T1 MRI of the abdomen.  6 mL Gadavist. Subtractions were  performed. FINDINGS: Liver: Heterogeneous reticular enhancement in the arterial phase is  reactive to the duodenal inflammation. No hepatic mass. Smooth surface. No  signal dropout on the out of phase images. Portal and hepatic veins are patent. Biliary tree: Cholecystectomy. Proximal CBD measures 8 mm. Distal CBD measures 6  mm. No intrabiliary calculus or abrupt termination. No ampullary mass. Pancreas: Signal is within normal limits. No evidence of mass or surrounding  inflammation. Pancreatic duct is within normal limits. No divisum. Spleen: Within normal limits. Adrenal glands: Within normal limits. Kidneys: Enhance symmetrically. No mass or hydronephrosis. Vasculature: Aortic atherosclerosis without aneurysm. Bowel: Lobulated, septated hyperintense T2 signal in the medial wall of the  second portion of the duodenum measures 2.3 x 1.1 x 2.5 cm. This finding  slightly increased since one day ago but decreased since 5 days ago. Mural  thickening of the second portion the duodenum persists. Edema surrounds the  duodenum. Lymph nodes: No lymphadenopathy. Miscellaneous: No ascites. Bones are within normal limits. Impression  IMPRESSION:  1. 2.3 x 1.1 x 2.5 cm septated lobulated fluid in the medial wall of the second  portion of the duodenum is decreased since 5 days ago but slightly increased  since one day ago. Given the mural thickening of the duodenum and the  surrounding inflammation, this finding most likely represents a contained  perforation of a duodenal ulcer. 2. No solid mass or evidence of malignancy. 3. Mild biliary dilatation. No ampullary mass. No choledocholithiasis.       Results from [place] encounter on 08/04/21    CTA HEAD NECK W CONT    Narrative  INDICATION:  R eye visison loss    CTA Head and CTA Neck performed with helical axial imaging with bolus IV  injection of 80 mL Isovue 370 contrast with 3D post processing performed, with  sagittal and coronal MIPS provided. Postenhanced Head CT images provided. CT  dose reduction was achieved through the use of a standardized protocol tailored  for this examination and automatic exposure control for dose modulation. This study was analyzed by the [address] ai algorithm. NECK:  There is 80% stenosis of the left subclavian artery 2 cm distal to its origin. Common carotid arteries show no significant stenosis. External carotid arteries are patent. There are two 90% tandem lesions of the right ICA origin. ICA Stenosis Assessment (NASCET criteria) Right 90%   Left:10%    Origin of the major brachiocephalic arteries from the aortic arch show no  significant stenosis. Vertebral arteries are patent,, right dominant, without  dissection or significant stenosis. Thyroid and neck soft tissues are  unremarkable for age. HEAD:  Intracranial circulation shows no major vessel occlusion, intraluminal thrombus,  aneurysm, AVM or evidence of irregularity suggestive of vasculitis. Ophthalmic  arteries are patent. Images of the brain show no bleed, mass, shift, hydrocephalus, or abnormal  enhancement. Impression  1. 90% right ICA stenosis, tandem lesions. 2. 80% left subclavian artery stenosis. 3. No intracranial large vessel occlusion.   Triglycerides 143  a1c was 5.9    I did independently review the head CT from August 4, 2021. There was no acute abnormalities. CTA performed on 8/4/2021 revealed 90% stenosis of the right ICA and 80% left subclavian artery stenosis. Sed rate 6    I did independently review the brain mri from 8/5/2021. No acute stroke. Active Problems:    CVA (cerebral vascular accident) ([address] (8/4/2021)      I spent 75    minutes providing care to this  acutely ill inpatient with > 50% of the time counseling and assisting in the coordination of care of the patient on the patient's hospital floor/unit.                     Signed By:  Darryl Qureshi DO FAAN    August 5, 2021

## 2021-08-05 NOTE — ED NOTES
Patient is being transferred to [facility] ICU, Room # 2420. Report given to YOVANY rivera on Adolm Manners for routine progression of care. Report consisted of the following information SBAR, ED Summary, MAR and Recent Results. Patient transferred to receiving unit by: Asuncion Young ED tech (RN or tech name). Outstanding consults needed: Yes    Next labs due: No     The following personal items will be sent with the patient during transfer to the floor: All valuables:    Cardiac monitoring ordered: Yes    The following CURRENT information was reported to the receiving RN:    Code status: Full Code at time of transfer    Last set of vital signs:  Vital Signs  Level of Consciousness: Alert (0) (08/04/21 1624)  Temp: 98.3 °F (36.8 °C) (08/04/21 1624)  Temp Source: Oral (08/04/21 1624)  Pulse (Heart Rate): 72 (08/05/21 0015)  Resp Rate: 20 (08/05/21 0015)  BP: 139/65 (08/05/21 0015)  MAP (Monitor): 84 (08/05/21 0015)  MAP (Calculated): 90 (08/05/21 0015)  BP 1 Location: Left arm (08/04/21 1624)  BP 1 Method: Automatic (08/04/21 1624)  BP Patient Position: Sitting (08/04/21 1624)  MEWS Score: 1 (08/04/21 1624)         Oxygen Therapy  O2 Sat (%): 96 % (08/05/21 0015)  Pulse via Oximetry: 72 beats per minute (08/05/21 0015)  O2 Device: None (Room air) (08/04/21 1624)      Last pain assessment:  Pain 1  Pain Scale 1: Numeric (0 - 10)  Pain Intensity 1: 0      Wounds: No     Urinary catheter: voiding  Is there a llamas order: No     LDAs:       Peripheral IV 08/04/21 Left Antecubital (Active)   Site Assessment Clean, dry, & intact 08/04/21 1809   Phlebitis Assessment 0 08/04/21 1809   Infiltration Assessment 0 08/04/21 1809   Dressing Status Clean, dry, & intact 08/04/21 1809   Dressing Type Transparent 08/04/21 1809   Hub Color/Line Status Pink 08/04/21 1809         Opportunity for questions and clarification was provided.     Tanesha Foot

## 2021-08-05 NOTE — PROGRESS NOTES
Transition of Care Plan:    RUR: 11%  Disposition: home with spouse  Follow up appointments: PCP, neurology  DME needed: none  Transportation at Discharge: Pt will drive himself home. [place] or means to access home:  yes      IM Medicare Letter: n/a  Is patient a BCPI-A Bundle: If yes, was Bundle Letter given?:   n/a  Caregiver Contact: Ms. Mikayla Ash [phone]  Discharge Caregiver contacted prior to discharge? CM will contact prior to d/c if pt desires. Reason for Admission:  CVA                     RUR Score:          11%           Plan for utilizing home health:    Not needed      PCP: First and Last name:  Echo Dan MD     Name of Practice:    Are you a current patient: Yes/No: yes   Approximate date of last visit: a few months ago   Can you participate in a virtual visit with your PCP: in office                    Current Advanced Directive/Advance Care Plan: Full Code    Ember 13 (ACP) Conversation      Date of Conversation: 8/5/21  Conducted with: Patient with Ashlee 153:     Click here to complete [address] including selection of the [address] Relationship (ie \"Primary\")      Content/Action Overview:   DECLINED ACP conversation - will revisit periodically   Reviewed DNR/DNI and patient elects Full Code (Attempt Resuscitation)    Length of Voluntary ACP Conversation in minutes:  <16 minutes (Non-Billable)    Dakota Leung                                    Transition of Care Plan:     Home with spouse    CM met with pt at bedside to introduce self/role, verify demographics, insurance and PCP. CM also discussed d/c plan. CM used the  at [phone]. Pt is a 65 yo, ,  male who was admitted to [facility] on 8/4/21 with a dx of CVA. Pt sees his PCP as needed. Pt uses the [facility].   Pt lives with his spouse in a one fl home with 0 BERENICE. Pt has a supportive sister and niece, Xuan [phone]. Pt does not use any DME. Pt does drive. Pt can complete his ADLs/IADLs independently. Pt denied a hx of HH, SNF or IPR. Pt will drive himself home at d/c. CM will continue to assess for d/c needs. Care Management Interventions  PCP Verified by CM:  Yes (Pt sees Dr. Reid Dorado)  Palliative Care Criteria Met (RRAT>21 & CHF Dx)?: No  Mode of Transport at Discharge: Self  Transition of Care Consult (CM Consult): Discharge Planning  Discharge Durable Medical Equipment: No  Physical Therapy Consult: Yes  Occupational Therapy Consult: Yes  Current Support Network: Lives with Spouse, Own Home (Pt lives with his spouse in a one fl home with 0 BERENICE.)  Confirm Follow Up Transport: Self  Name of the Patient Representative Who was Provided with a Choice of Provider and Agrees with the Discharge Plan: Gail Yañez  Discharge Location  Discharge Placement: Home with family assistance    Nicolette Pierson,   Care Management, ED [facility]  [phone]

## 2021-08-05 NOTE — PROGRESS NOTES
OCCUPATIONAL THERAPY EVALUATION/DISCHARGE  Patient: Radha Rincon [de-identified]64 y.o. male)  Date: 8/5/2021  Primary Diagnosis: CVA (cerebral vascular accident) [facility]) [I63.9]       Precautions:        ASSESSMENT  Based on the objective data described below, the patient presents near independent baseline following admission for CVA workup. Pt lives with his wife and is I with ADLs, IADLs and mobility at baseline. He was received semisupine in bed, agreeable to participate. Reports pain/numbness to LLE and numbness to R hand. Fugl Bourne UE assessment performed and pt scored 66/66, UE ROM and strength WFL. He performed bed mobility and functional mobility with overall modified independence. Pt reported dizziness after first sit>stand, vitals assessed and stable, dizziness resolved. Pt performed LB dressing ADL, toileting and standing grooming ADL with independence-mod I. Anticipate no further OT needs at discharge. Current Level of Function (ADLs/self-care): mod I-independent    Functional Outcome Measure: The patient scored Total A-D  Total A-D (Motor Function): 66/66 on the Fugl-Bourne Assessment which is indicative of no impairment in upper extremity functional status. Other factors to consider for discharge: independent baseline, lives with family     PLAN :  Recommendation for discharge: (in order for the patient to meet his/her long term goals)  No skilled occupational therapy/ follow up rehabilitation needs identified at this time. This discharge recommendation:  Has not yet been discussed the attending provider and/or case management    IF patient discharges home will need the following DME: none       SUBJECTIVE:   Patient stated It hurts more when I walk (LLE).     OBJECTIVE DATA SUMMARY:   HISTORY:   Past Medical History:   Diagnosis Date    Current smoker     Hypertension     Pancreatitis, chronic (HCC)     Pseudocyst of pancreas     PUD (peptic ulcer disease)      Past Surgical History:   Procedure Laterality Date    HX CHOLECYSTECTOMY      UPPER GI ENDOSCOPY,BIOPSY  8/19/2020         UPPER GI ENDOSCOPY,BIOPSY  8/28/2020            Prior Level of Function/Environment/Context: I with ADLs, IADLs and mobility, drives. Lives with his wife  Expanded or extensive additional review of patient history:     Home Situation  # Steps to Enter: 0  One/Two Story Residence: One story  Living Alone: No  Support Systems: Family member(s), Spouse/Significant Other/Partner  Current DME Used/Available at Home: None    Hand dominance: Right    EXAMINATION OF PERFORMANCE DEFICITS:  Cognitive/Behavioral Status:  Neurologic State: Alert  Orientation Level: Oriented X4  Cognition: Follows commands  Perception: Appears intact  Perseveration: No perseveration noted  Safety/Judgement: Awareness of environment    Hearing: Auditory  Auditory Impairment: None    Vision/Perceptual:         Acuity: Within Defined Limits         Range of Motion:  AROM: Within functional limits  PROM: Within functional limits          Strength:  Strength: Within functional limits (BLE grossly 4+/5)          Coordination:  Coordination: Within functional limits  Fine Motor Skills-Upper: Left Intact; Right Intact    Gross Motor Skills-Upper: Left Intact; Right Intact    Tone & Sensation:  Tone: Normal  Sensation: Impaired (c/o numbness/edema R hand/knuckles/fingers & LLE)       Balance:  Sitting: Intact; Without support  Standing: Intact; Without support    Functional Mobility and Transfers for ADLs:  Bed Mobility:  Supine to Sit: Independent  Sit to Supine: Independent  Scooting: Independent    Transfers:  Sit to Stand: Modified independent  Stand to Sit: Modified independent  Toilet Transfer : Modified independent    ADL Assessment:  Feeding: Independent    Oral Facial Hygiene/Grooming: Modified Independent (standing)    Bathing: Modified independent    Upper Body Dressing: Modified independent    Lower Body Dressing: Modified independent    Toileting: Modified independent     ADL Intervention and task modifications:       Grooming  Position Performed: Standing  Washing Hands: Modified independent      Lower Body Dressing Assistance  Socks: Independent  Leg Crossed Method Used: Yes  Position Performed: Seated edge of bed    Toileting  Bladder Hygiene: Independent  Clothing Management: Modified independent    Cognitive Retraining  Safety/Judgement: Awareness of environment    Functional Measure:  Fugl-Bourne Assessment of Motor Recovery after Stroke:   Reflex Activity  Flexors/Biceps/Fingers: Can be elicited  Extensors/Triceps: Can be elicited  Reflex Subtotal: 4    Volitional Movement Within Synergies  Shoulder Retraction: Full  Shoulder Elevation: Full  Shoulder Abduction (90 degrees): Full  Shoulder External Rotation: Full  Elbow Flexion: Full  Forearm Supination: Full  Shoulder Adduction/Internal Rotation: Full  Elbow Extension: Full  Forearm Pronation: Full  Subtotal: 18    Volitional Movement Mixing Synergies  Hand to Lumbar Spine: Full  Shoulder Flexion (0-90 degrees): Full  Pronation-Supination: Full  Subtotal: 6    Volitional Movement With Little or No Synergy  Shoulder Abduction (0-90 degrees): Full  Shoulder Flexion ( degrees): Full  Pronation/Supination: Full  Subtotal : 6    Normal Reflex Activity  Biceps, Triceps, Finger Flexors:  Full  Subtotal : 2    Upper Extremity Total   Upper Extremity Total: 36    Wrist  Stability at 15 Degree Dorsiflexion: Full  Repeated Dorsiflexion/ Volar Flexion: Full  Stability at 15 Degree Dorsiflexion: Full  Repeated Dorsiflexion/ Volar Flexion: Full  Circumduction: Full  Wrist Total: 10    Hand  Mass Flexion: Full  Mass Extension: Full  Grasp A: Full  Grasp B: Full  Grasp C: Full  Grasp D: Full  Grasp E: Full  Hand Total: 14    Coordination/Speed  Tremor: None  Dysmetria: None  Time: <1s  Coordination/Speed Total : 6    Total A-D  Total A-D (Motor Function): 66/66     This is a reliable/valid measure of arm function after a neurological event. It has established value to characterize functional status and for measuring spontaneous and therapy-induced recovery; tests proximal and distal motor functions. Fugl-Bourne Assessment  UE scores recorded between five and 30 days post neurologic event can be used to predict UE recovery at six months post neurologic event. Severe = 0-21 points   Moderately Severe = 22-33 points   Moderate = 34-47 points   Mild = 48-66 points  MYRON Garrison, NELA Linton, & CINDY Arellano (1992). Measurement of motor recovery after stroke: Outcome assessment and sample size requirements. Stroke, 23, pp. 4674-0782.   ------------------------------------------------------------------------------------------------------------------------------------------------------------------  MCID:  Stroke:   Luis Robert et al, 2001; n = 171; mean age 79 (5) years; assessed within 16 (12) days of stroke, Acute Stroke)  FMA Motor Scores from Admission to Discharge   10 point increase in FMA Upper Extremity = 1.5 change in discharge FIM   10 point increase in FMA Lower Extremity = 1.9 change in discharge FIM  MDC:   Stroke:   Derick Fay et al, 2008, n = 14, mean age = 59.9 (14.6) years, assessed on average 14 (6.5) months post stroke, Chronic Stroke)   FMA = 5.2 points for the Upper Extremity portion of the assessment   Barthel Index:    Bathin  Bladder: 10  Bowels: 10  Groomin  Dressing: 10  Feeding: 10  Mobility: 15  Stairs: 5  Toilet Use: 10  Transfer (Bed to Chair and Back): 15  Total: 95/100        The Barthel ADL Index: Guidelines  1. The index should be used as a record of what a patient does, not as a record of what a patient could do. 2. The main aim is to establish degree of independence from any help, physical or verbal, however minor and for whatever reason. 3. The need for supervision renders the patient not independent.   4. A patient's performance should be established using the best available evidence. Asking the patient, friends/relatives and nurses are the usual sources, but direct observation and common sense are also important. However direct testing is not needed. 5. Usually the patient's performance over the preceding 24-48 hours is important, but occasionally longer periods will be relevant. 6. Middle categories imply that the patient supplies over 50 per cent of the effort. 7. Use of aids to be independent is allowed. Beulah Pierre., Barthel, DNoreenW. (3533). Functional evaluation: the Barthel Index. [address] [facility] (14)2. NADINE Jeffery, Levi Luque., Olivia Monterroso., Isha, [address] (1999). Measuring the change indisability after inpatient rehabilitation; comparison of the responsiveness of the Barthel Index and Functional [place] Measure. Journal of Neurology, Neurosurgery, and Psychiatry, 66(4), 877-193. Stephanie Barnett, N.J.A, LEAH Gonzalez, & Wing Gunn MCICI. (2004.) Assessment of post-stroke quality of life in cost-effectiveness studies: The usefulness of the Barthel Index and the EuroQoL-5D.  Quality of Life Research, 15, 175-60     Occupational Therapy Evaluation Charge Determination   History Examination Decision-Making   LOW Complexity : Brief history review  LOW Complexity : 1-3 performance deficits relating to physical, cognitive , or psychosocial skils that result in activity limitations and / or participation restrictions  LOW Complexity : No comorbidities that affect functional and no verbal or physical assistance needed to complete eval tasks       Based on the above components, the patient evaluation is determined to be of the following complexity level: LOW   Pain Rating:  Pt reported pain in LLE that increased with ambulating    Activity Tolerance:   Good    After treatment patient left in no apparent distress:    Supine in bed and Call bell within reach    COMMUNICATION/EDUCATION:   The patients plan of care was discussed with: Physical therapist and Registered nurse. Patient was educated regarding his deficit(s) of LLE numbness as this relates to his diagnosis of TIA. He demonstrated Good understanding as evidenced by teachback. Patient and/or family was verbally educated on the BE FAST acronym for signs/symptoms of CVA and TIA. BE FAST was written on patient's communication board  for visual education and reinforcement. All questions answered with patient indicating good understanding.      Thank you for this referral.  Harmon Sacks, OT  Time Calculation: 26 mins

## 2021-08-05 NOTE — PROGRESS NOTES
End of Shift Note    Bedside shift change report given to Elsie (oncoming nurse) by SILVIA Riggs (offgoing nurse). Report included the following information SBAR    Shift worked:  [id number]   Shift summary and any significant changes:     Admitted to [facility]; urine sent; MRI screening form completed and faxed        Concerns for physician to address:  None   Zone phone for oncoming shift:   [phone]     Patient Information  Aden Dent  64 y.o.  8/4/2021  5:37 PM by Nino Alegria DO. Aden Dent was admitted from Home    Problem List  Patient Active Problem List    Diagnosis Date Noted    CVA [facility]cerebral vascular accident) ([facility] [address] 08/04/2021    Abdominal pain 09/01/2020    Alcohol-induced chronic pancreatitis ([facility] [address] 08/28/2020    Pancreatic pseudocyst 08/28/2020    Smoking 08/27/2020    Duodenal abscess 08/27/2020    Other acute pancreatitis with uninfected necrosis 08/19/2020    Duodenal ulcer with perforation ([facility] [address] 08/13/2020     Past Medical History:   Diagnosis Date    Current smoker     Hypertension     Pancreatitis, chronic (HCC)     Pseudocyst of pancreas     PUD (peptic ulcer disease)        Core Measures:  CVA: No Yes  CHF:No No  PNA:No No    Activity:  Activity Level:  Up with Assistance  Number times ambulated in hallways past shift: 0  Number of times OOB to chair past shift: 0    Cardiac:   Cardiac Monitoring: Yes      Cardiac Rhythm: Sinus Rhythm    Access:   Current line(s): PIV     Genitourinary:   Urinary status: voiding    Respiratory:   O2 Device: None (Room air)  Chronic home O2 use?: N/A  Incentive spirometer at bedside: N/A       GI:  Last Bowel Movement Date: 08/04/21  Current diet:  ADULT DIET Regular; Low Fat/Low Chol/High Fiber/ALKA  Passing flatus: YES  Tolerating current diet: YES       Pain Management:   Patient states pain is manageable on current regimen: N/A    Skin:  Cristiano Score: 20  Interventions: increase time out of bed and limit briefs    Patient Safety:  Fall Score:  Total Score: 1  Interventions: bed/chair alarm and pt to call before getting OOB     @Rollbelt  @dexterity to release roll belt  Yes/No ( must document dexterity  here by stating Yes or No here, otherwise this is a restraint and must follow restraint documentation policy.)    DVT prophylaxis:  DVT prophylaxis Med- Yes  DVT prophylaxis SCD or LEAH- No     Wounds: (If Applicable)  Wounds- No  Location     Active Consults:  IP CONSULT TO HOSPITALIST  IP CONSULT TO NEUROLOGY    Length of Stay:  Expected LOS: - - -  Actual LOS: 1  Discharge Plan: No; SILVIA Martinez

## 2021-08-05 NOTE — PROGRESS NOTES
Speech Pathology Note    Reviewed chart and note patient admitted with loss of vision with concern for CVA. Note CT showed no acute intracranial finding and MRI results are pending. Note patient passed the STAND and a regular diet was ordered. NIHSS=0. Discussed case with RN who reported no SLP-related concerns. Introduced self and role of SLP to patient. Patient denied any decline in motor speech, language, cognitive, or swallowing function, and patient informally observed drinking thin liquids with no difficulty. Patient Ox4. Formal SLP evaluation not clinically indicated at this time. Will sign off. Please re-consult if MRI results show acute infarct or if further concerns arise. Thank you.     Kelli Dang M.S., CF-SLP

## 2021-08-05 NOTE — PROGRESS NOTES
PHYSICAL THERAPY EVALUATION WITH DISCHARGE  Patient: Gail Yañez [de-identified]64 y.o. male)  Date: 8/5/2021  Primary Diagnosis: CVA (cerebral vascular accident) [facility]) [I63.9]       Precautions: none         ASSESSMENT  Based on the objective data described below, the patient presents with baseline functional mobility with the exception of LLE pain and intermittent numbness which he is able to compensate for without loss of balance or safety concerns at this time. Strength/ROM are equal at this time. He has c/o dizziness with positional changes but reports improved after prolonged sitting/standing efforts. No further skilled PT needs are identified, will sign off. Functional Outcome Measure: The patient scored Total: 48/56 on the Liriano Balance Assessment which is indicative of low fall risk. Other factors to consider for discharge: none     Further skilled acute physical therapy is not indicated at this time. PLAN :  Recommendation for discharge: (in order for the patient to meet his/her long term goals)  No skilled physical therapy/ follow up rehabilitation needs identified at this time. This discharge recommendation:  A follow-up discussion with the attending provider and/or case management is planned    IF patient discharges home will need the following DME: none         SUBJECTIVE:   Patient stated Dizzy.   Pt c/o dizziness with positional changes.     OBJECTIVE DATA SUMMARY:   HISTORY:    Past Medical History:   Diagnosis Date    Current smoker     Hypertension     Pancreatitis, chronic (HCC)     Pseudocyst of pancreas     PUD (peptic ulcer disease)      Past Surgical History:   Procedure Laterality Date    HX CHOLECYSTECTOMY      UPPER GI ENDOSCOPY,BIOPSY  8/19/2020         UPPER GI ENDOSCOPY,BIOPSY  8/28/2020            Prior level of function: independent, no device use, driving, working \"some\"  Personal factors and/or comorbidities impacting plan of care: EXAMINATION/PRESENTATION/DECISION MAKING:   Critical Behavior:  Neurologic State: Alert  Orientation Level: Oriented X4  Cognition: Follows commands     Hearing: Auditory  Auditory Impairment: None  Skin:  Intact  Edema: R hand/knuckles/fingers  Range Of Motion:  AROM: Within functional limits           PROM: Within functional limits           Strength:    Strength: Within functional limits (BLE grossly 4+/5)                    Tone & Sensation:   Tone: Normal              Sensation: Impaired (c/o numbness/edema R hand/knuckles/fingers & LLE)               Coordination:  Coordination: Within functional limits     Functional Mobility:  Bed Mobility:     Supine to Sit: Independent  Sit to Supine: Independent  Scooting: Independent  Transfers:  Sit to Stand: Modified independent  Stand to Sit: Modified independent  Stand Pivot Transfers: Modified independent                    Balance:   Sitting: Intact; Without support  Standing: Intact; Without support  Ambulation/Gait Training:  Distance (ft): 200 Feet (ft)     Ambulation - Level of Assistance: Modified independent     Gait Description (WDL): Within defined limits        Speed/Gogo: Pace decreased (<100 feet/min)         Functional Measure  Liriano Balance Test:    Sitting to Standing: 3  Standing Unsupported: 4  Sitting with Back Unsupported: 4  Standing to Sittin  Transfers: 4  Standing Unsupported with Eyes Closed: 4  Standing Unsupported with Feet Together: 4  Reach Forward with Outstretched Arm: 4   Object: 4  Turn to Look Over Shoulders: 4  Turn 360 Degrees: 2  Alternate Foot on Step/Stool: 3  Standing Unsupported One Foot in Front: 2  Stand on One Le  Total: 48/56         56=Maximum possible score;   0-20=High fall risk  21-40=Moderate fall risk   41-56=Low fall risk        Physical Therapy Evaluation Charge Determination   History Examination Presentation Decision-Making   MEDIUM  Complexity : 1-2 comorbidities / personal factors will impact the outcome/ POC  LOW Complexity : 1-2 Standardized tests and measures addressing body structure, function, activity limitation and / or participation in recreation  LOW Complexity : Stable, uncomplicated  Other outcome measures Liriano  LOW       Based on the above components, the patient evaluation is determined to be of the following complexity level: LOW     Pain Rating:  C/o pain LLE with gait    Activity Tolerance:   Good    After treatment patient left in no apparent distress:   Call bell within reach and seated edge of bed for lunch    COMMUNICATION/EDUCATION:   The patients plan of care was discussed with: Occupational therapist and Registered nurse. Patient was educated regarding His deficit(s) of LLE numbness as this relates to His diagnosis of ? TIA. He demonstrated Good understanding as evidenced by verbalization. Patient and/or family was verbally educated on the BE FAST acronym for signs/symptoms of CVA and TIA. BE FAST was written on patient's communication board  for visual education and reinforcement. All questions answered with patient indicating good understanding. Fall prevention education was provided and the patient/caregiver indicated understanding.     Thank you for this referral.  Twila Tamez, PT, DPT   Time Calculation: 24 mins

## 2021-08-05 NOTE — CONSULTS
Vascular Surgery Consult    Subjective:     Yadi Alvarez is a 64 y.o.  male who was admitted with recurrent episodes of amaurosis fugax. This started in May and has gradually worsened in frequency and duration. He denies any residual deficits at this time. He was not on ASA prior to admission. MRI shows no acute infarct. CTA shows tandem 90% Rt ICA stenosis. He speaks only Macedonian so the interpretation service was used. I then also talked with his daughter mimi speaks [place]. Past Medical History:   Diagnosis Date    Current smoker     Hypertension     Pancreatitis, chronic (HCC)     Pseudocyst of pancreas     PUD (peptic ulcer disease)       Past Surgical History:   Procedure Laterality Date    HX CHOLECYSTECTOMY      UPPER GI ENDOSCOPY,BIOPSY  8/19/2020         UPPER GI ENDOSCOPY,BIOPSY  8/28/2020          No family history on file. Social History     Tobacco Use    Smoking status: Current Every Day Smoker    Smokeless tobacco: Never Used   Substance Use Topics    Alcohol use: Yes     Comment: socially       Prior to Admission medications    Medication Sig Start Date End Date Taking? Authorizing Provider   rosuvastatin (CRESTOR) 10 mg tablet Take 1 Tab by mouth nightly. 9/13/20   Roseanne Denton MD   cefdinir (OMNICEF) 300 mg capsule Take 1 Cap by mouth two (2) times a day. 9/8/20   Gisela Huerta MD   amLODIPine (NORVASC) 5 mg tablet Take 1 Tab by mouth daily. 9/9/20   Gisela Huerta MD   polyethylene glycol (MIRALAX) 17 gram packet Take 1 Packet by mouth daily as needed for Constipation. 9/8/20   Gisela Huerta MD     No Known Allergies     Review of Systems:  Denies CP/SOB    Objective:     Physical Exam:   Visit Vitals  BP (!) 142/75 (BP 1 Location: Right upper arm, BP Patient Position: Sitting)   Pulse 75   Temp 98.4 °F (36.9 °C)   Resp 16   Ht 5' 10\" (1.778 m)   Wt 63 kg (139 lb)   SpO2 99%   BMI 19.94 kg/m²     General:  Alert, cooperative, no distress, appears stated age. [place] speaking. Head:  Normocephalic, without obvious abnormality, atraumatic. Neck: Supple, symmetrical, trachea midline, no adenopathy, thyroid: no enlargement/tenderness/nodules, no carotid bruit and no JVD. Lungs:   Clear to auscultation bilaterally. Heart:  Regular rate and rhythm, S1, S2 normal, no murmur, click, rub or gallop. Abdomen:   Soft, non-tender. Bowel sounds normal. No masses,  No organomegaly. Extremities: Extremities normal, atraumatic, no cyanosis or edema. Pulses: 2+ and symmetric upper extremities. Neurologic: Normal strength, sensation throughout. Assessment:     TIA due to severe Rt ICA stenosis  I do not have any concern for GCA  No need for TA Bx  To treat the more cephalad stenosis w/ CEA would require clamping at the level of mid C-2, so it is not surgically accessible w/o increased risk of cranial nerve injury or stroke. He is therefore a poor candidate for CEA and a much better candidate for TCAR    Plan:     I have started Plavix  He can be discharged anytime on ASA, Plavix, and Crestor  My office will contact his daughter to arrange returning for TCAR next week  He should not stop ASA, Plavix, or Crestor before surgery  55 min was spent in review of imaging, patient care, discussion w/ family and hospitalist, and documentation. Extra time was required due to the need to use an .      Signed By: Jessica Hope MD     August 5, 2021

## 2021-08-05 NOTE — DISCHARGE SUMMARY
Hospitalist Discharge Summary     Patient ID:  Dangelo Mcmahan  [id number]  33 y.o.  1960  8/4/2021    PCP on record: Malinda Clemons MD    Admit date: 8/4/2021  Discharge date and time: 8/5/2021    DISCHARGE DIAGNOSIS:  Acute CVA/TIA ruled out MRI negative. Carotid artery disease with right-sided carotid stenosis patient seen by vascular appreciate vascular recommendation recommended Plavix aspirin patient to be brought back already contacted by the office for TCAR. Hyperlipidemia continue Crestor  Nicotine abuse disorder nicotine replacement therapy    Essential hypertension we will continue antihypertensive. Patient was also counseled on smoking cessation. CONSULTATIONS:  IP CONSULT TO HOSPITALIST  IP CONSULT TO NEUROLOGY  IP CONSULT TO VASCULAR SURGERY    Excerpted HPI from H&P of Nazanin Lea DO:  Dangelo Mcmahan is a 64 y.o.  gentleman with past medical history of HTN, HLD, history of EtOH abuse, and tobacco abuse who presents to the ED with intermittent transient right-sided visual loss which has been going on since March of this year. Patient has episodes where the right eye will \"go black\" for periods of 3-5 minutes. He last experienced such an episode on Monday and denies any current visual symptoms. Patient denies any headache, has experienced episodic nausea, denies vomiting. Patient does admit some soreness with chewing food at the upper right jaw and indicates the preauricular area on that side. No tenderness along temporal artery, patient denies shoulder or hip girdle pain/tenderness. He does also admit some left lower extremity numbness and B/L lower extremity claudication. ______________________________________________________________________  DISCHARGE SUMMARY/HOSPITAL COURSE:  for full details see H&P, daily progress notes, labs, consult notes.    Patient is a 51-year-old who was admitted to the emergency room patient presented with visual loss amaurosis which apparently has been ongoing no dysphagia no headaches no dysarthria no aphasia. No weakness of any part of the body noted. MRI was unremarkable for CVA patient was also seen by neurology. Patient is being discharged home on aspirin Plavix. Continue statin. And patient should continue the antiplatelets and not to miss to patient is contacted for the procedure. I did talk to family in detail through language and also I did have patient's daughter who understands English very well explained to him and the plan of care the medications and they verbalized understanding. However if condition recurs or worsens should come back to the emergency room. _______________________________________________________________________  Patient seen and examined by me on discharge day. Pertinent Findings:  Gen:    Not in distress  Chest: Clear lungs  CVS:   Regular rhythm. No edema  Abd:  Soft, not distended, not tender  Neuro:  Alert,   _______________________________________________________________________  DISCHARGE MEDICATIONS:   Current Discharge Medication List      START taking these medications    Details   nicotine (NICODERM CQ) 14 mg/24 hr patch 1 Patch by TransDERmal route daily for 30 days. Qty: 30 Patch, Refills: 0  Start date: 8/6/2021, End date: 9/5/2021      clopidogreL (PLAVIX) 75 mg tab Take 1 Tablet by mouth daily. Qty: 75 Tablet, Refills: 0  Start date: 8/6/2021      aspirin delayed-release 81 mg tablet Take 1 Tablet by mouth daily. Qty: 30 Tablet, Refills: 0  Start date: 8/5/2021         CONTINUE these medications which have CHANGED    Details   rosuvastatin (CRESTOR) 20 mg tablet Take 1 Tablet by mouth nightly. Qty: 30 Tablet, Refills: 0  Start date: 8/5/2021         CONTINUE these medications which have NOT CHANGED    Details   amLODIPine (NORVASC) 5 mg tablet Take 1 Tab by mouth daily.   Qty: 30 Tab, Refills: 0      polyethylene glycol (MIRALAX) 17 gram packet Take 1 Packet by mouth daily as needed for Constipation. Qty: 30 Packet, Refills: 0         STOP taking these medications       cefdinir (OMNICEF) 300 mg capsule Comments:   Reason for Stopping:                 Patient Follow Up Instructions:    Activity: as tolerated  Diet:cardiac       Follow-up with pcp,  Vascular to call [facility] to arrange for TCAR      Follow-up Information     Follow up With Specialties Details Why Contact Info    Mona Duong MD Internal Medicine   [id number] [phone]          ________________________________________________________________    Risk of deterioration: low    Condition at Discharge:  Stable  __________________________________________________________________    Disposition  home    ____________________________________________________________________    Code Status: full  ___________________________________________________________________      Total time in minutes spent coordinating this discharge (includes going over instructions, follow-up, prescriptions, and preparing report for sign off to her PCP) :38 minutes    Signed:  Tutu Stout MD

## 2021-08-05 NOTE — PROGRESS NOTES
Problem: Patient Education: Go to Patient Education Activity  Goal: Patient/Family Education  Outcome: Progressing Towards Goal     Problem: TIA/CVA Stroke: 0-24 hours  Goal: Off Pathway (Use only if patient is Off Pathway)  Outcome: Progressing Towards Goal  Goal: Activity/Safety  Outcome: Progressing Towards Goal  Goal: Consults, if ordered  Outcome: Progressing Towards Goal  Goal: Diagnostic Test/Procedures  Outcome: Progressing Towards Goal  Goal: Nutrition/Diet  Outcome: Progressing Towards Goal

## 2021-08-09 ENCOUNTER — TELEPHONE (OUTPATIENT)
Dept: FAMILY MEDICINE CLINIC | Age: 61
End: 2021-08-09

## 2021-08-09 ENCOUNTER — HOSPITAL ENCOUNTER (OUTPATIENT)
Dept: PREADMISSION TESTING | Age: 61
Discharge: HOME | DRG: 024 | End: 2021-08-09
Attending: SURGERY
Payer: MEDICAID

## 2021-08-09 VITALS
HEIGHT: 71 IN | TEMPERATURE: 98.1 F | HEART RATE: 76 BPM | BODY MASS INDEX: 19.88 KG/M2 | SYSTOLIC BLOOD PRESSURE: 136 MMHG | OXYGEN SATURATION: 100 % | DIASTOLIC BLOOD PRESSURE: 68 MMHG | RESPIRATION RATE: 20 BRPM | WEIGHT: 141.98 LBS

## 2021-08-09 LAB
ABO + RH BLD: NORMAL
ALBUMIN SERPL-MCNC: 3.5 G/DL (ref 3.5–5)
ALBUMIN/GLOB SERPL: 0.9 {RATIO} (ref 1.1–2.2)
ALP SERPL-CCNC: 96 U/L (ref 45–117)
ALT SERPL-CCNC: 20 U/L (ref 12–78)
ANION GAP SERPL CALC-SCNC: 1 MMOL/L (ref 5–15)
APPEARANCE UR: CLEAR
APTT PPP: 25.9 SEC (ref 22.1–31)
AST SERPL-CCNC: 18 U/L (ref 15–37)
BACTERIA URNS QL MICRO: NEGATIVE /HPF
BILIRUB SERPL-MCNC: 0.3 MG/DL (ref 0.2–1)
BILIRUB UR QL: NEGATIVE
BLOOD GROUP ANTIBODIES SERPL: NORMAL
BUN SERPL-MCNC: 10 MG/DL (ref 6–20)
BUN/CREAT SERPL: 11 (ref 12–20)
CALCIUM SERPL-MCNC: 8.8 MG/DL (ref 8.5–10.1)
CHLORIDE SERPL-SCNC: 106 MMOL/L (ref 97–108)
CO2 SERPL-SCNC: 32 MMOL/L (ref 21–32)
COLOR UR: NORMAL
CREAT SERPL-MCNC: 0.91 MG/DL (ref 0.7–1.3)
EPITH CASTS URNS QL MICRO: NORMAL /LPF
ERYTHROCYTE [DISTWIDTH] IN BLOOD BY AUTOMATED COUNT: 15.1 % (ref 11.5–14.5)
GLOBULIN SER CALC-MCNC: 3.8 G/DL (ref 2–4)
GLUCOSE SERPL-MCNC: 92 MG/DL (ref 65–100)
GLUCOSE UR STRIP.AUTO-MCNC: NEGATIVE MG/DL
HCT VFR BLD AUTO: 47.9 % (ref 36.6–50.3)
HGB BLD-MCNC: 15.1 G/DL (ref 12.1–17)
HGB UR QL STRIP: NEGATIVE
HYALINE CASTS URNS QL MICRO: NORMAL /LPF (ref 0–5)
INR PPP: 1 (ref 0.9–1.1)
KETONES UR QL STRIP.AUTO: NEGATIVE MG/DL
LEUKOCYTE ESTERASE UR QL STRIP.AUTO: NEGATIVE
MCH RBC QN AUTO: 27.6 PG (ref 26–34)
MCHC RBC AUTO-ENTMCNC: 31.5 G/DL (ref 30–36.5)
MCV RBC AUTO: 87.6 FL (ref 80–99)
NITRITE UR QL STRIP.AUTO: NEGATIVE
NRBC # BLD: 0 K/UL (ref 0–0.01)
NRBC BLD-RTO: 0 PER 100 WBC
PH UR STRIP: 7 [PH] (ref 5–8)
PLATELET # BLD AUTO: 259 K/UL (ref 150–400)
PMV BLD AUTO: 9.4 FL (ref 8.9–12.9)
POTASSIUM SERPL-SCNC: 3.8 MMOL/L (ref 3.5–5.1)
PROT SERPL-MCNC: 7.3 G/DL (ref 6.4–8.2)
PROT UR STRIP-MCNC: NEGATIVE MG/DL
PROTHROMBIN TIME: 10.6 SEC (ref 9–11.1)
RBC # BLD AUTO: 5.47 M/UL (ref 4.1–5.7)
RBC #/AREA URNS HPF: NORMAL /HPF (ref 0–5)
SODIUM SERPL-SCNC: 139 MMOL/L (ref 136–145)
SP GR UR REFRACTOMETRY: 1.01 (ref 1–1.03)
SPECIMEN EXP DATE BLD: NORMAL
THERAPEUTIC RANGE,PTTT: NORMAL SECS (ref 58–77)
UA: UC IF INDICATED,UAUC: NORMAL
UROBILINOGEN UR QL STRIP.AUTO: 1 EU/DL (ref 0.2–1)
WBC # BLD AUTO: 13.7 K/UL (ref 4.1–11.1)
WBC URNS QL MICRO: NORMAL /HPF (ref 0–4)

## 2021-08-09 PROCEDURE — 81001 URINALYSIS AUTO W/SCOPE: CPT

## 2021-08-09 PROCEDURE — 86901 BLOOD TYPING SEROLOGIC RH(D): CPT

## 2021-08-09 PROCEDURE — 85027 COMPLETE CBC AUTOMATED: CPT

## 2021-08-09 PROCEDURE — 85730 THROMBOPLASTIN TIME PARTIAL: CPT

## 2021-08-09 PROCEDURE — 80053 COMPREHEN METABOLIC PANEL: CPT

## 2021-08-09 PROCEDURE — 85610 PROTHROMBIN TIME: CPT

## 2021-08-09 PROCEDURE — 36415 COLL VENOUS BLD VENIPUNCTURE: CPT

## 2021-08-09 NOTE — PERIOP NOTES
[facility]  Preoperative Instructions        Surgery Date Thu., 8/12/21          Time of Arrival 7:30am, [facility] @ [phone]    1. On the day of your surgery, please report to the Surgical Services Registration Desk and sign in at your designated time. The Surgery Center is located to the right of the Emergency Room. 2. You must have someone with you to drive you home. You should not drive a car for 24 hours following surgery. Please make arrangements for a friend or family member to stay with you for the first 24 hours after your surgery. 3. Do not have anything to eat or drink (including water, gum, mints, coffee, juice) after midnight Wed., 8/11/21. ? This may not apply to medications prescribed by your physician. ?(Please note below the special instructions with medications to take the morning of your procedure.)    4. We recommend you do not drink any alcoholic beverages for 24 hours before and after your surgery. 5. Contact your surgeons office for instructions on the following medications: non-steroidal anti-inflammatory drugs (i.e. Advil, Aleve), vitamins, and supplements. (Some surgeons will want you to stop these medications prior to surgery and others may allow you to take them)  **If you are currently taking Plavix, Coumadin, Aspirin and/or other blood-thinning agents, contact your surgeon for instructions. ** Your surgeon will partner with the physician prescribing these medications to determine if it is safe to stop or if you need to continue taking. Please do not stop taking these medications without instructions from your surgeon    6. Wear comfortable clothes. Wear glasses instead of contacts. Do not bring any money or jewelry. Please bring picture ID, insurance card, and any prearranged co-payment or hospital payment. Do not wear make-up, particularly mascara the morning of your surgery.   Do not wear nail polish, particularly if you are having foot /hand surgery. Wear your hair loose or down, no ponytails, buns, yessica pins or clips. All body piercings must be removed. Please shower with antibacterial soap for three consecutive days before and on the morning of surgery, but do not apply any lotions, powders or deodorants after the shower on the day of surgery. Please use a fresh towels after each shower. Please sleep in clean clothes and change bed linens the night before surgery. Please do not shave for 48 hours prior to surgery. Shaving of the face is acceptable. 7. You should understand that if you do not follow these instructions your surgery may be cancelled. If your physical condition changes (I.e. fever, cold or flu) please contact your surgeon as soon as possible. 8. It is important that you be on time. If a situation occurs where you may be late, please call [phone] (OR Holding Area). 9. If you have any questions and or problems, please call [phone] (Pre-admission Testing). 10. Your surgery time may be subject to change. You will receive a phone call the evening prior if your time changes. 11.  If having outpatient surgery, you must have someone to drive you here, stay with you during the duration of your stay, and to drive you home at time of discharge. Special Instructions: Follow surgeon instructions for holding all non-steroidal anti-inflammatory drugs (NSAIDs), vitamins & supplements prior to surgery. TAKE ALL MEDICATIONS DAY OF SURGERY EXCEPT: Miralax      I understand a pre-operative phone call will be made to verify my surgery time. In the event that I am not available, I give permission for a message to be left on my answering service and/or with another person?   yes         ___________________      __________   _________    (Signature of Patient)             (Witness)                (Date and Time)

## 2021-08-09 NOTE — TELEPHONE ENCOUNTER
Rowan calling from Pre admission tesiing -  RE:  upcoming surgery on 8/12/21    She reports amlodipine is on med list but patient is not taking it or getting it filled - she said maybe a hospitalist had written it previously     She just wanted to make you aware in case you want to call it in for him   BP today is 136/68    Best number to reach her is [phone]

## 2021-08-09 NOTE — PERIOP NOTES
Incentive Spirometer        Using the incentive spirometer helps expand the small air sacs of your lungs, helps you breathe deeply, and helps improve your lung function. Use your incentive spirometer twice a day (10 breaths each time) prior to surgery. How to Use Your Incentive Spirometer:  1. Hold the incentive spirometer in an upright position. 2. Breathe out as usual.   3. Place the mouthpiece in your mouth and seal your lips tightly around it. 4. Take a deep breath. Breathe in slowly and as deeply as possible. Keep the blue flow rate guide between the arrows. 5. Hold your breath as long as possible. Then exhale slowly and allow the piston to fall to the bottom of the column. 6. Rest for a few seconds and repeat steps one through five at least 10 times. PAT Tidal Volume___1500___  x___2___  Date___8/9/21___    Mary Larkinrot THE INCENTIVE SPIROMETER WITH YOU TO THE HOSPITAL ON THE DAY OF YOUR SURGERY. Opportunity given to ask and answer questions as well as to observe return demonstration.       Patient signature_____________________________          Witness____________________________

## 2021-08-10 NOTE — ADVANCED PRACTICE NURSE
EKG from 8/4/21 reviewed with Dr. Reji Gallo, anesthesiologist and compared with previous EKG from 8/31/21. Planned procedure, PMHx, echocardiogram 8/5/21, functional status reviewed.  Pt ok to proceed with planned procedure per Dr. Reji Gallo

## 2021-08-11 ENCOUNTER — ANESTHESIA EVENT (OUTPATIENT)
Dept: SURGERY | Age: 61
DRG: 024 | End: 2021-08-11
Payer: MEDICAID

## 2021-08-12 ENCOUNTER — HOSPITAL ENCOUNTER (INPATIENT)
Age: 61
LOS: 1 days | Discharge: HOME | DRG: 024 | End: 2021-08-13
Attending: SURGERY | Admitting: SURGERY
Payer: MEDICAID

## 2021-08-12 ENCOUNTER — APPOINTMENT (OUTPATIENT)
Dept: GENERAL RADIOLOGY | Age: 61
DRG: 024 | End: 2021-08-12
Attending: SURGERY
Payer: MEDICAID

## 2021-08-12 ENCOUNTER — ANESTHESIA (OUTPATIENT)
Dept: SURGERY | Age: 61
DRG: 024 | End: 2021-08-12
Payer: MEDICAID

## 2021-08-12 DIAGNOSIS — I65.21 CAROTID STENOSIS, SYMPTOMATIC W/O INFARCT, RIGHT: Primary | ICD-10-CM

## 2021-08-12 PROCEDURE — 77030013058 HC DEV INFL ANGIO BSC -B: Performed by: SURGERY

## 2021-08-12 PROCEDURE — 77030002996 HC SUT SLK J&J -A: Performed by: SURGERY

## 2021-08-12 PROCEDURE — 74011250636 HC RX REV CODE- 250/636: Performed by: ANESTHESIOLOGY

## 2021-08-12 PROCEDURE — 77030002986 HC SUT PROL J&J -A: Performed by: SURGERY

## 2021-08-12 PROCEDURE — 74011250636 HC RX REV CODE- 250/636: Performed by: SURGERY

## 2021-08-12 PROCEDURE — C1769 GUIDE WIRE: HCPCS | Performed by: SURGERY

## 2021-08-12 PROCEDURE — 76000 FLUOROSCOPY <1 HR PHYS/QHP: CPT

## 2021-08-12 PROCEDURE — 74011000258 HC RX REV CODE- 258: Performed by: NURSE ANESTHETIST, CERTIFIED REGISTERED

## 2021-08-12 PROCEDURE — C1894 INTRO/SHEATH, NON-LASER: HCPCS | Performed by: SURGERY

## 2021-08-12 PROCEDURE — 77030020061 HC IV BLD WRMR ADMIN SET 3M -B: Performed by: NURSE ANESTHETIST, CERTIFIED REGISTERED

## 2021-08-12 PROCEDURE — 77030031139 HC SUT VCRL2 J&J -A: Performed by: SURGERY

## 2021-08-12 PROCEDURE — 76210000000 HC OR PH I REC 2 TO 2.5 HR: Performed by: SURGERY

## 2021-08-12 PROCEDURE — 77030002933 HC SUT MCRYL J&J -A: Performed by: SURGERY

## 2021-08-12 PROCEDURE — 77030013079 HC BLNKT BAIR HGGR 3M -A: Performed by: NURSE ANESTHETIST, CERTIFIED REGISTERED

## 2021-08-12 PROCEDURE — 74011250636 HC RX REV CODE- 250/636: Performed by: NURSE ANESTHETIST, CERTIFIED REGISTERED

## 2021-08-12 PROCEDURE — 77030026438 HC STYL ET INTUB CARD -A: Performed by: NURSE ANESTHETIST, CERTIFIED REGISTERED

## 2021-08-12 PROCEDURE — C1876 STENT, NON-COA/NON-COV W/DEL: HCPCS | Performed by: SURGERY

## 2021-08-12 PROCEDURE — 77030008684 HC TU ET CUF COVD -B: Performed by: NURSE ANESTHETIST, CERTIFIED REGISTERED

## 2021-08-12 PROCEDURE — 74011000636 HC RX REV CODE- 636: Performed by: SURGERY

## 2021-08-12 PROCEDURE — 76010000162 HC OR TIME 1.5 TO 2 HR INTENSV-TIER 1: Performed by: SURGERY

## 2021-08-12 PROCEDURE — 72040 X-RAY EXAM NECK SPINE 2-3 VW: CPT

## 2021-08-12 PROCEDURE — 74011250637 HC RX REV CODE- 250/637: Performed by: SURGERY

## 2021-08-12 PROCEDURE — 65660000000 HC RM CCU STEPDOWN

## 2021-08-12 PROCEDURE — 77030005401 HC CATH RAD ARRO -A: Performed by: NURSE ANESTHETIST, CERTIFIED REGISTERED

## 2021-08-12 PROCEDURE — C1892 INTRO/SHEATH,FIXED,PEEL-AWAY: HCPCS | Performed by: SURGERY

## 2021-08-12 PROCEDURE — 74011000250 HC RX REV CODE- 250: Performed by: NURSE ANESTHETIST, CERTIFIED REGISTERED

## 2021-08-12 PROCEDURE — 76060000034 HC ANESTHESIA 1.5 TO 2 HR: Performed by: SURGERY

## 2021-08-12 PROCEDURE — 77030010507 HC ADH SKN DERMBND J&J -B: Performed by: SURGERY

## 2021-08-12 PROCEDURE — C1725 CATH, TRANSLUMIN NON-LASER: HCPCS | Performed by: SURGERY

## 2021-08-12 PROCEDURE — 74011000250 HC RX REV CODE- 250: Performed by: SURGERY

## 2021-08-12 PROCEDURE — 2709999900 HC NON-CHARGEABLE SUPPLY: Performed by: SURGERY

## 2021-08-12 PROCEDURE — 037K3DZ DILATION OF RIGHT INTERNAL CAROTID ARTERY WITH INTRALUMINAL DEVICE, PERCUTANEOUS APPROACH: ICD-10-PCS | Performed by: SURGERY

## 2021-08-12 PROCEDURE — 74011250636 HC RX REV CODE- 250/636

## 2021-08-12 PROCEDURE — 77030014008 HC SPNG HEMSTAT J&J -C: Performed by: SURGERY

## 2021-08-12 PROCEDURE — 77030013797 HC KT TRNSDUC PRSSR EDWD -A: Performed by: NURSE ANESTHETIST, CERTIFIED REGISTERED

## 2021-08-12 DEVICE — 8 MM X 40 MM
Type: IMPLANTABLE DEVICE | Site: CAROTID | Status: FUNCTIONAL
Brand: ENROUTE TRANSCAROTID STENT

## 2021-08-12 RX ORDER — PROPOFOL 10 MG/ML
INJECTION, EMULSION INTRAVENOUS AS NEEDED
Status: DISCONTINUED | OUTPATIENT
Start: 2021-08-12 | End: 2021-08-12 | Stop reason: HOSPADM

## 2021-08-12 RX ORDER — AMLODIPINE BESYLATE 5 MG/1
5 TABLET ORAL DAILY
Status: DISCONTINUED | OUTPATIENT
Start: 2021-08-13 | End: 2021-08-13 | Stop reason: HOSPADM

## 2021-08-12 RX ORDER — SODIUM CHLORIDE 9 MG/ML
50 INJECTION, SOLUTION INTRAVENOUS CONTINUOUS
Status: DISCONTINUED | OUTPATIENT
Start: 2021-08-12 | End: 2021-08-13

## 2021-08-12 RX ORDER — DEXAMETHASONE SODIUM PHOSPHATE 4 MG/ML
INJECTION, SOLUTION INTRA-ARTICULAR; INTRALESIONAL; INTRAMUSCULAR; INTRAVENOUS; SOFT TISSUE AS NEEDED
Status: DISCONTINUED | OUTPATIENT
Start: 2021-08-12 | End: 2021-08-12 | Stop reason: HOSPADM

## 2021-08-12 RX ORDER — FACIAL-BODY WIPES
10 EACH TOPICAL DAILY PRN
Status: DISCONTINUED | OUTPATIENT
Start: 2021-08-12 | End: 2021-08-13 | Stop reason: HOSPADM

## 2021-08-12 RX ORDER — PROTAMINE SULFATE 10 MG/ML
INJECTION, SOLUTION INTRAVENOUS AS NEEDED
Status: DISCONTINUED | OUTPATIENT
Start: 2021-08-12 | End: 2021-08-12 | Stop reason: HOSPADM

## 2021-08-12 RX ORDER — SODIUM CHLORIDE 0.9 % (FLUSH) 0.9 %
5-40 SYRINGE (ML) INJECTION EVERY 8 HOURS
Status: DISCONTINUED | OUTPATIENT
Start: 2021-08-12 | End: 2021-08-12 | Stop reason: HOSPADM

## 2021-08-12 RX ORDER — MORPHINE SULFATE 2 MG/ML
2 INJECTION, SOLUTION INTRAMUSCULAR; INTRAVENOUS
Status: DISCONTINUED | OUTPATIENT
Start: 2021-08-12 | End: 2021-08-13

## 2021-08-12 RX ORDER — PHENYLEPHRINE HCL IN 0.9% NACL 0.4MG/10ML
SYRINGE (ML) INTRAVENOUS
Status: DISCONTINUED | OUTPATIENT
Start: 2021-08-12 | End: 2021-08-12 | Stop reason: HOSPADM

## 2021-08-12 RX ORDER — FENTANYL CITRATE 50 UG/ML
25 INJECTION, SOLUTION INTRAMUSCULAR; INTRAVENOUS
Status: DISCONTINUED | OUTPATIENT
Start: 2021-08-12 | End: 2021-08-12 | Stop reason: HOSPADM

## 2021-08-12 RX ORDER — LIDOCAINE HYDROCHLORIDE 20 MG/ML
INJECTION, SOLUTION EPIDURAL; INFILTRATION; INTRACAUDAL; PERINEURAL AS NEEDED
Status: DISCONTINUED | OUTPATIENT
Start: 2021-08-12 | End: 2021-08-12 | Stop reason: HOSPADM

## 2021-08-12 RX ORDER — HYDROMORPHONE HYDROCHLORIDE 1 MG/ML
0.5 INJECTION, SOLUTION INTRAMUSCULAR; INTRAVENOUS; SUBCUTANEOUS
Status: DISCONTINUED | OUTPATIENT
Start: 2021-08-12 | End: 2021-08-12 | Stop reason: HOSPADM

## 2021-08-12 RX ORDER — FENTANYL CITRATE 50 UG/ML
25 INJECTION, SOLUTION INTRAMUSCULAR; INTRAVENOUS
Status: COMPLETED | OUTPATIENT
Start: 2021-08-12 | End: 2021-08-12

## 2021-08-12 RX ORDER — ROCURONIUM BROMIDE 10 MG/ML
INJECTION, SOLUTION INTRAVENOUS AS NEEDED
Status: DISCONTINUED | OUTPATIENT
Start: 2021-08-12 | End: 2021-08-12 | Stop reason: HOSPADM

## 2021-08-12 RX ORDER — ONDANSETRON 2 MG/ML
4 INJECTION INTRAMUSCULAR; INTRAVENOUS AS NEEDED
Status: DISCONTINUED | OUTPATIENT
Start: 2021-08-12 | End: 2021-08-12 | Stop reason: HOSPADM

## 2021-08-12 RX ORDER — SODIUM CHLORIDE 0.9 % (FLUSH) 0.9 %
5-40 SYRINGE (ML) INJECTION AS NEEDED
Status: DISCONTINUED | OUTPATIENT
Start: 2021-08-12 | End: 2021-08-12 | Stop reason: HOSPADM

## 2021-08-12 RX ORDER — SODIUM CHLORIDE 9 MG/ML
25 INJECTION, SOLUTION INTRAVENOUS CONTINUOUS
Status: DISCONTINUED | OUTPATIENT
Start: 2021-08-12 | End: 2021-08-12 | Stop reason: HOSPADM

## 2021-08-12 RX ORDER — ROSUVASTATIN CALCIUM 20 MG/1
20 TABLET, COATED ORAL
Status: DISCONTINUED | OUTPATIENT
Start: 2021-08-12 | End: 2021-08-13 | Stop reason: HOSPADM

## 2021-08-12 RX ORDER — LIDOCAINE HYDROCHLORIDE 10 MG/ML
0.1 INJECTION, SOLUTION EPIDURAL; INFILTRATION; INTRACAUDAL; PERINEURAL AS NEEDED
Status: DISCONTINUED | OUTPATIENT
Start: 2021-08-12 | End: 2021-08-12 | Stop reason: HOSPADM

## 2021-08-12 RX ORDER — SODIUM CHLORIDE, SODIUM LACTATE, POTASSIUM CHLORIDE, CALCIUM CHLORIDE 600; 310; 30; 20 MG/100ML; MG/100ML; MG/100ML; MG/100ML
25 INJECTION, SOLUTION INTRAVENOUS CONTINUOUS
Status: DISCONTINUED | OUTPATIENT
Start: 2021-08-12 | End: 2021-08-12 | Stop reason: HOSPADM

## 2021-08-12 RX ORDER — ROPIVACAINE HYDROCHLORIDE 5 MG/ML
30 INJECTION, SOLUTION EPIDURAL; INFILTRATION; PERINEURAL AS NEEDED
Status: DISCONTINUED | OUTPATIENT
Start: 2021-08-12 | End: 2021-08-12 | Stop reason: HOSPADM

## 2021-08-12 RX ORDER — MORPHINE SULFATE 2 MG/ML
2 INJECTION, SOLUTION INTRAMUSCULAR; INTRAVENOUS
Status: DISCONTINUED | OUTPATIENT
Start: 2021-08-12 | End: 2021-08-12 | Stop reason: HOSPADM

## 2021-08-12 RX ORDER — POLYETHYLENE GLYCOL 3350 17 G/17G
17 POWDER, FOR SOLUTION ORAL DAILY
Status: DISCONTINUED | OUTPATIENT
Start: 2021-08-13 | End: 2021-08-13 | Stop reason: HOSPADM

## 2021-08-12 RX ORDER — MIDAZOLAM HYDROCHLORIDE 1 MG/ML
INJECTION, SOLUTION INTRAMUSCULAR; INTRAVENOUS AS NEEDED
Status: DISCONTINUED | OUTPATIENT
Start: 2021-08-12 | End: 2021-08-12 | Stop reason: HOSPADM

## 2021-08-12 RX ORDER — ONDANSETRON 2 MG/ML
INJECTION INTRAMUSCULAR; INTRAVENOUS AS NEEDED
Status: DISCONTINUED | OUTPATIENT
Start: 2021-08-12 | End: 2021-08-12 | Stop reason: HOSPADM

## 2021-08-12 RX ORDER — ASPIRIN 81 MG/1
81 TABLET ORAL DAILY
Status: DISCONTINUED | OUTPATIENT
Start: 2021-08-13 | End: 2021-08-13 | Stop reason: HOSPADM

## 2021-08-12 RX ORDER — ACETAMINOPHEN 325 MG/1
650 TABLET ORAL ONCE
Status: DISCONTINUED | OUTPATIENT
Start: 2021-08-12 | End: 2021-08-12 | Stop reason: HOSPADM

## 2021-08-12 RX ORDER — ONDANSETRON 2 MG/ML
4 INJECTION INTRAMUSCULAR; INTRAVENOUS
Status: DISCONTINUED | OUTPATIENT
Start: 2021-08-12 | End: 2021-08-13 | Stop reason: HOSPADM

## 2021-08-12 RX ORDER — PROTAMINE SULFATE 10 MG/ML
25 INJECTION, SOLUTION INTRAVENOUS
Status: COMPLETED | OUTPATIENT
Start: 2021-08-12 | End: 2021-08-12

## 2021-08-12 RX ORDER — PHENYLEPHRINE HYDROCHLORIDE 10 MG/ML
INJECTION INTRAVENOUS
Status: DISPENSED
Start: 2021-08-12 | End: 2021-08-13

## 2021-08-12 RX ORDER — IBUPROFEN 200 MG
1 TABLET ORAL DAILY
Status: DISCONTINUED | OUTPATIENT
Start: 2021-08-13 | End: 2021-08-13 | Stop reason: HOSPADM

## 2021-08-12 RX ORDER — SODIUM CHLORIDE, SODIUM LACTATE, POTASSIUM CHLORIDE, CALCIUM CHLORIDE 600; 310; 30; 20 MG/100ML; MG/100ML; MG/100ML; MG/100ML
100 INJECTION, SOLUTION INTRAVENOUS CONTINUOUS
Status: DISCONTINUED | OUTPATIENT
Start: 2021-08-12 | End: 2021-08-12 | Stop reason: HOSPADM

## 2021-08-12 RX ORDER — HEPARIN SODIUM 1000 [USP'U]/ML
INJECTION, SOLUTION INTRAVENOUS; SUBCUTANEOUS AS NEEDED
Status: DISCONTINUED | OUTPATIENT
Start: 2021-08-12 | End: 2021-08-12 | Stop reason: HOSPADM

## 2021-08-12 RX ORDER — DIPHENHYDRAMINE HYDROCHLORIDE 50 MG/ML
12.5 INJECTION, SOLUTION INTRAMUSCULAR; INTRAVENOUS AS NEEDED
Status: DISCONTINUED | OUTPATIENT
Start: 2021-08-12 | End: 2021-08-12 | Stop reason: HOSPADM

## 2021-08-12 RX ORDER — ADHESIVE BANDAGE
30 BANDAGE TOPICAL DAILY PRN
Status: DISCONTINUED | OUTPATIENT
Start: 2021-08-12 | End: 2021-08-13 | Stop reason: HOSPADM

## 2021-08-12 RX ORDER — ACETAMINOPHEN 325 MG/1
650 TABLET ORAL
Status: DISCONTINUED | OUTPATIENT
Start: 2021-08-12 | End: 2021-08-13 | Stop reason: HOSPADM

## 2021-08-12 RX ORDER — MIDAZOLAM HYDROCHLORIDE 1 MG/ML
1 INJECTION, SOLUTION INTRAMUSCULAR; INTRAVENOUS AS NEEDED
Status: DISCONTINUED | OUTPATIENT
Start: 2021-08-12 | End: 2021-08-12 | Stop reason: HOSPADM

## 2021-08-12 RX ORDER — OXYCODONE AND ACETAMINOPHEN 5; 325 MG/1; MG/1
1 TABLET ORAL
Status: DISCONTINUED | OUTPATIENT
Start: 2021-08-12 | End: 2021-08-13 | Stop reason: HOSPADM

## 2021-08-12 RX ORDER — FENTANYL CITRATE 50 UG/ML
50 INJECTION, SOLUTION INTRAMUSCULAR; INTRAVENOUS AS NEEDED
Status: DISCONTINUED | OUTPATIENT
Start: 2021-08-12 | End: 2021-08-12 | Stop reason: HOSPADM

## 2021-08-12 RX ORDER — FENTANYL CITRATE 50 UG/ML
INJECTION, SOLUTION INTRAMUSCULAR; INTRAVENOUS
Status: COMPLETED
Start: 2021-08-12 | End: 2021-08-12

## 2021-08-12 RX ORDER — MIDAZOLAM HYDROCHLORIDE 1 MG/ML
0.5 INJECTION, SOLUTION INTRAMUSCULAR; INTRAVENOUS
Status: DISCONTINUED | OUTPATIENT
Start: 2021-08-12 | End: 2021-08-12 | Stop reason: HOSPADM

## 2021-08-12 RX ORDER — GLYCOPYRROLATE 0.2 MG/ML
INJECTION INTRAMUSCULAR; INTRAVENOUS AS NEEDED
Status: DISCONTINUED | OUTPATIENT
Start: 2021-08-12 | End: 2021-08-12 | Stop reason: HOSPADM

## 2021-08-12 RX ORDER — FENTANYL CITRATE 50 UG/ML
INJECTION, SOLUTION INTRAMUSCULAR; INTRAVENOUS AS NEEDED
Status: DISCONTINUED | OUTPATIENT
Start: 2021-08-12 | End: 2021-08-12 | Stop reason: HOSPADM

## 2021-08-12 RX ORDER — SUCCINYLCHOLINE CHLORIDE 20 MG/ML
INJECTION INTRAMUSCULAR; INTRAVENOUS AS NEEDED
Status: DISCONTINUED | OUTPATIENT
Start: 2021-08-12 | End: 2021-08-12 | Stop reason: HOSPADM

## 2021-08-12 RX ORDER — CLOPIDOGREL BISULFATE 75 MG/1
75 TABLET ORAL DAILY
Status: DISCONTINUED | OUTPATIENT
Start: 2021-08-13 | End: 2021-08-13 | Stop reason: HOSPADM

## 2021-08-12 RX ADMIN — MORPHINE SULFATE 2 MG: 2 INJECTION, SOLUTION INTRAMUSCULAR; INTRAVENOUS at 16:29

## 2021-08-12 RX ADMIN — MIDAZOLAM HYDROCHLORIDE 2 MG: 1 INJECTION, SOLUTION INTRAMUSCULAR; INTRAVENOUS at 10:44

## 2021-08-12 RX ADMIN — FENTANYL CITRATE 25 MCG: 50 INJECTION, SOLUTION INTRAMUSCULAR; INTRAVENOUS at 13:12

## 2021-08-12 RX ADMIN — SODIUM CHLORIDE 50 ML/HR: 9 INJECTION, SOLUTION INTRAVENOUS at 13:01

## 2021-08-12 RX ADMIN — OXYCODONE HYDROCHLORIDE AND ACETAMINOPHEN 1 TABLET: 5; 325 TABLET ORAL at 15:44

## 2021-08-12 RX ADMIN — MORPHINE SULFATE 2 MG: 2 INJECTION, SOLUTION INTRAMUSCULAR; INTRAVENOUS at 23:16

## 2021-08-12 RX ADMIN — ROCURONIUM BROMIDE 40 MG: 10 INJECTION INTRAVENOUS at 11:00

## 2021-08-12 RX ADMIN — DEXAMETHASONE SODIUM PHOSPHATE 10 MG: 4 INJECTION, SOLUTION INTRAMUSCULAR; INTRAVENOUS at 10:57

## 2021-08-12 RX ADMIN — MORPHINE SULFATE 2 MG: 2 INJECTION, SOLUTION INTRAMUSCULAR; INTRAVENOUS at 20:05

## 2021-08-12 RX ADMIN — PHENYLEPHRINE HYDROCHLORIDE 40 MCG/MIN: 10 INJECTION INTRAVENOUS at 16:08

## 2021-08-12 RX ADMIN — PROTAMINE SULFATE 25 MG: 10 INJECTION, SOLUTION INTRAVENOUS at 13:44

## 2021-08-12 RX ADMIN — FENTANYL CITRATE 25 MCG: 50 INJECTION, SOLUTION INTRAMUSCULAR; INTRAVENOUS at 12:52

## 2021-08-12 RX ADMIN — FENTANYL CITRATE 25 MCG: 50 INJECTION, SOLUTION INTRAMUSCULAR; INTRAVENOUS at 13:25

## 2021-08-12 RX ADMIN — SODIUM CHLORIDE, POTASSIUM CHLORIDE, SODIUM LACTATE AND CALCIUM CHLORIDE 25 ML/HR: 600; 310; 30; 20 INJECTION, SOLUTION INTRAVENOUS at 08:38

## 2021-08-12 RX ADMIN — ROCURONIUM BROMIDE 10 MG: 10 INJECTION INTRAVENOUS at 10:53

## 2021-08-12 RX ADMIN — WATER 2 G: 1 INJECTION INTRAMUSCULAR; INTRAVENOUS; SUBCUTANEOUS at 10:57

## 2021-08-12 RX ADMIN — SUCCINYLCHOLINE CHLORIDE 180 MG: 20 INJECTION, SOLUTION INTRAMUSCULAR; INTRAVENOUS at 10:53

## 2021-08-12 RX ADMIN — LIDOCAINE HYDROCHLORIDE 60 MG: 20 INJECTION, SOLUTION EPIDURAL; INFILTRATION; INTRACAUDAL; PERINEURAL at 10:53

## 2021-08-12 RX ADMIN — FENTANYL CITRATE 100 MCG: 50 INJECTION, SOLUTION INTRAMUSCULAR; INTRAVENOUS at 10:53

## 2021-08-12 RX ADMIN — HEPARIN SODIUM 8000 UNITS: 1000 INJECTION, SOLUTION INTRAVENOUS; SUBCUTANEOUS at 11:18

## 2021-08-12 RX ADMIN — ROSUVASTATIN 20 MG: 20 TABLET, FILM COATED ORAL at 21:32

## 2021-08-12 RX ADMIN — PROPOFOL 200 MG: 10 INJECTION, EMULSION INTRAVENOUS at 10:53

## 2021-08-12 RX ADMIN — HEPARIN SODIUM 1000 UNITS: 1000 INJECTION, SOLUTION INTRAVENOUS; SUBCUTANEOUS at 11:36

## 2021-08-12 RX ADMIN — OXYCODONE HYDROCHLORIDE AND ACETAMINOPHEN 1 TABLET: 5; 325 TABLET ORAL at 21:32

## 2021-08-12 RX ADMIN — ONDANSETRON HYDROCHLORIDE 4 MG: 2 INJECTION, SOLUTION INTRAMUSCULAR; INTRAVENOUS at 11:54

## 2021-08-12 RX ADMIN — GLYCOPYRROLATE 0.2 MG: 0.2 INJECTION, SOLUTION INTRAMUSCULAR; INTRAVENOUS at 11:08

## 2021-08-12 RX ADMIN — SUGAMMADEX 260 MG: 100 INJECTION, SOLUTION INTRAVENOUS at 12:08

## 2021-08-12 RX ADMIN — FENTANYL CITRATE 25 MCG: 50 INJECTION, SOLUTION INTRAMUSCULAR; INTRAVENOUS at 13:05

## 2021-08-12 RX ADMIN — DEXTROSE MONOHYDRATE 5 MG/HR: 50 INJECTION, SOLUTION INTRAVENOUS at 12:23

## 2021-08-12 RX ADMIN — PROTAMINE SULFATE 25 MG: 10 INJECTION, SOLUTION INTRAVENOUS at 11:54

## 2021-08-12 RX ADMIN — FENTANYL CITRATE 25 MCG: 50 INJECTION, SOLUTION INTRAMUSCULAR; INTRAVENOUS at 13:00

## 2021-08-12 RX ADMIN — Medication 80 MCG/MIN: at 10:56

## 2021-08-12 RX ADMIN — FENTANYL CITRATE 25 MCG: 50 INJECTION, SOLUTION INTRAMUSCULAR; INTRAVENOUS at 14:26

## 2021-08-12 RX ADMIN — Medication 3 AMPULE: at 08:38

## 2021-08-12 NOTE — ANESTHESIA POSTPROCEDURE EVALUATION
Procedure(s):  RIGHT TRANSCAROTID ARTERY REVASCULARIZATION. general    Anesthesia Post Evaluation        Patient location during evaluation: PACU  Note status: Adequate. Level of consciousness: responsive to verbal stimuli and sleepy but conscious  Pain management: satisfactory to patient  Airway patency: patent  Anesthetic complications: no  Cardiovascular status: acceptable  Respiratory status: acceptable  Hydration status: acceptable  Comments: +Post-Anesthesia Evaluation and Assessment    Patient: Marlyn Servin MRN: [id number]  SSN: [id number]   YOB: 1960  Age: 64 y.o. Sex: male          Cardiovascular Function/Vital Signs    /65   Pulse 64   Temp 36.4 °C (97.6 °F)   Resp 15   Wt 63.4 kg (139 lb 12.4 oz)   SpO2 100%   BMI 19.49 kg/m²     Patient is status post Procedure(s):  RIGHT TRANSCAROTID ARTERY REVASCULARIZATION. Nausea/Vomiting: Controlled. Postoperative hydration reviewed and adequate. Pain:      Managed. Neurological Status:   Neuro (WDL): Within Defined Limits (08/12/21 0759)   At baseline. Mental Status and Level of Consciousness: Arousable. Pulmonary Status:   O2 Device: Nasal cannula (08/12/21 1240)   Adequate oxygenation and airway patent. Complications related to anesthesia: None    Post-anesthesia assessment completed. No concerns. I have evaluated the patient and the patient is stable and ready to be discharged from PACU . Signed By: Spring Garcia MD    8/12/2021        INITIAL Post-op Vital signs:   Vitals Value Taken Time   /71 08/12/21 1315   Temp 36.4 °C (97.6 °F) 08/12/21 1240   Pulse 59 08/12/21 1319   Resp 15 08/12/21 1319   SpO2 98 % 08/12/21 1319   Vitals shown include unvalidated device data.

## 2021-08-12 NOTE — PERIOP NOTES
1320 notified Dr. Parris Apgar   anterior neck incision with increased swelling. Dr. Parris Apgar at bedside 1330 examining patient, received order protamine 25mg iv and ice to neck incision. See MAR.
ACT resulted at 246. RN notified MD of result and no further orders received.
Handoff Report from Operating Room to PACU    Report received from [facility] and [facility] regarding Fransisco Banegas. Surgeon(s):  Diana Ramirez MD  And Procedure(s) (LRB):  RIGHT TRANSCAROTID ARTERY REVASCULARIZATION (Right)  confirmed   with allergies and dressings discussed. Anesthesia type, drugs, patient history, complications, estimated blood loss, vital signs, intake and output, and last pain medication and reversal medications were reviewed. 1236 pt arrived pacu with cardene gtt @ 3mg/hr.  Monitoring patient
TRANSFER - OUT REPORT:    Verbal report given to [facility] RN  on Isidore Libman  being transferred to 2265(unit) for routine post - op       Report consisted of patients Situation, Background, Assessment and   Recommendations(SBAR). Information from the following report(s) SBAR, OR Summary, Procedure Summary, Intake/Output, MAR and Cardiac Rhythm SB was reviewed with the receiving nurse. Opportunity for questions and clarification was provided.       Patient transported with:   Monitor  O2 @ 2 liters  Registered Nurse  [facility]
I have reviewed and confirmed nurses' notes for patient's medications, allergies, medical history, and surgical history.

## 2021-08-12 NOTE — PROGRESS NOTES
Primary Nurse Axel Litlte RN and Tobi Berkowitz RN performed a dual skin assessment on this patient No impairment noted  Cristiano score is 19

## 2021-08-12 NOTE — PROGRESS NOTES
1918: Bedside shift change report given to Ken Valadez RN (oncoming nurse) by Saleem Sifuentes RN (offgoing nurse). Report included the following information SBAR, Kardex, Intake/Output, MAR, Recent Results, Med Rec Status and Cardiac Rhythm Sinus Carmela Aaron. 2008: Patient requesting pain medication for 10/10 pain in his neck near his incision site. Administered PRN morphine for pain. 2054: Patient now rates pain at 7/10 after receiving morphine. 2133: Administered PRN Percocet for patient's pain that was not fully relieved by PRN morphine. Patient still rates pain at 7/10.    2204: Patient states pain is now at a 4/10.    2316: Patient states pain is now back at a severity of 10/10. Administered PRN morphine. 2348: Patient rates pain at 5/10 after receiving morphine. 0240: Patient asking for pain medication for 10/10 pain in his neck. Administered PRN morphine. 0330: Patient now rates pain at 5/10.    0519: Patient requesting more pain medication, stating his pain is now back at a 10/10. Administered PRN Percocet.    0602: Patient states pain is now at 7/10.    7181: Patient requesting medication for 7/10 pain at his incision site of his neck. PRN morphine given. 0700: End of Shift Note    Bedside shift change report given to Duane Finely., RN (oncoming nurse) by Amrik Richter (offgoing nurse). Report included the following information SBAR, Kardex, Intake/Output, MAR, Recent Results, Med Rec Status and Cardiac Rhythm Sinus Carmela Aaron    Shift worked:  7p-7a     Shift summary and any significant changes:     Patient received PRN morphine four times as well as PRN Percocet twice for pain control overnight. Concerns for physician to address:  None     Zone phone for oncoming shift:   [phone]       Activity:  Activity Level:  Up with Assistance  Number times ambulated in hallways past shift: 0  Number of times OOB to chair past shift: 0    Cardiac:   Cardiac Monitoring: Yes      Cardiac Rhythm: Sinus Geo    Access:   Current line(s): PIV     Genitourinary:   Urinary status: voiding    Respiratory:   O2 Device: None (Room air)  Chronic home O2 use?: NO  Incentive spirometer at bedside: NO     GI:  Last Bowel Movement Date:  (PTA)  Current diet:  ADULT DIET Regular  DIET ONE TIME MESSAGE  Passing flatus: YES  Tolerating current diet: YES       Pain Management:   Patient states pain is manageable on current regimen: YES    Skin:  Cristiano Score: 20  Interventions: increase time out of bed    Patient Safety:  Fall Score:  Total Score: 1  Interventions: bed/chair alarm and pt to call before getting OOB       Length of Stay:  Expected LOS: - - -  Actual LOS: [address]

## 2021-08-12 NOTE — ANESTHESIA PROCEDURE NOTES
Arterial Line Placement    Start time: 8/12/2021 10:23 AM  End time: 8/12/2021 10:33 AM  Performed by: Brian Dougherty MD  Authorized by: Brian Dougherty MD     Pre-Procedure  Indications:  Arterial pressure monitoring and blood sampling  Preanesthetic Checklist: patient identified, risks and benefits discussed, anesthesia consent, site marked, patient being monitored, timeout performed and patient being monitored      Procedure:   Prep:  Chlorhexidine  Seldinger Technique?: Yes    Orientation:  Right  Location:  Radial artery  Catheter size:  20 G  Number of attempts:  1    Assessment:   Post-procedure:  Line secured and sterile dressing applied  Patient Tolerance:  Patient tolerated the procedure well with no immediate complications

## 2021-08-12 NOTE — ANESTHESIA PREPROCEDURE EVALUATION
Relevant Problems   No relevant active problems       Anesthetic History   No history of anesthetic complications            Review of Systems / Medical History  Patient summary reviewed, nursing notes reviewed and pertinent labs reviewed    Pulmonary  Within defined limits        Smoker         Neuro/Psych   Within defined limits      TIA     Cardiovascular  Within defined limits  Hypertension          PAD    Exercise tolerance: >4 METS  Comments: · LV: Estimated LVEF is 55 - 60%. Normal cavity size, wall thickness and systolic function (ejection fraction normal). · Saline contrast was given to evaluate for intracardiac shunt.    GI/Hepatic/Renal  Within defined limits         PUD     Endo/Other  Within defined limits           Other Findings              Physical Exam    Airway  Mallampati: II  TM Distance: > 6 cm  Neck ROM: normal range of motion   Mouth opening: Normal     Cardiovascular  Regular rate and rhythm,  S1 and S2 normal,  no murmur, click, rub, or gallop             Dental    Dentition: Edentulous     Pulmonary  Breath sounds clear to auscultation               Abdominal  GI exam deferred       Other Findings            Anesthetic Plan    ASA: 2  Anesthesia type: general    Monitoring Plan: Arterial line      Induction: Intravenous  Anesthetic plan and risks discussed with: Patient

## 2021-08-12 NOTE — BRIEF OP NOTE
Brief Postoperative Note    Patient: Diana Carmona  YOB: 1960  MRN: [id number]    Date of Procedure: 8/12/2021     Pre-Op Diagnosis: SYMPTOMATIC CAROTID STENOSIS    Post-Op Diagnosis: Same as preoperative diagnosis. Procedure(s):  RIGHT TRANSCAROTID ARTERY REVASCULARIZATION    Surgeon(s):  Roopa Pimentel MD    Surgical Assistant: Surg Asst-1: Tyler Juarez    Anesthesia: General     Estimated Blood Loss (mL): less than 50     Complications: None    Specimens: * No specimens in log *     Implants:   Implant Name Type Inv. Item Serial No.  Lot No. LRB No. Used Action   STENT CAR 0.014 IN 8X40 MM 5 [id number] CM ENROUTE - SN/A  STENT CAR 0.014 IN 8X40 MM 5 FRX57 CM ENROUTE N/A [id number] [facility] [id number] Right 1 Implanted       Drains: * No LDAs found *    Findings: Severe tandem carotid stenosis, distal CCA at bifurcation and proximal ICA. Stop short. PTA w/ 5x4 and stent w/ 8x4. Good result.     Electronically Signed by Zoe Riley MD on 8/12/2021 at 12:19 PM

## 2021-08-13 VITALS
HEART RATE: 53 BPM | BODY MASS INDEX: 19.86 KG/M2 | TEMPERATURE: 97.8 F | RESPIRATION RATE: 20 BRPM | SYSTOLIC BLOOD PRESSURE: 97 MMHG | DIASTOLIC BLOOD PRESSURE: 59 MMHG | WEIGHT: 142.4 LBS | OXYGEN SATURATION: 99 %

## 2021-08-13 PROCEDURE — 74011250636 HC RX REV CODE- 250/636: Performed by: SURGERY

## 2021-08-13 PROCEDURE — 74011250637 HC RX REV CODE- 250/637: Performed by: SURGERY

## 2021-08-13 PROCEDURE — 74011250637 HC RX REV CODE- 250/637: Performed by: NURSE PRACTITIONER

## 2021-08-13 RX ORDER — AMLODIPINE BESYLATE 5 MG/1
5 TABLET ORAL DAILY
Qty: 30 TABLET | Refills: 0 | Status: SHIPPED | OUTPATIENT
Start: 2021-08-13 | End: 2021-11-18

## 2021-08-13 RX ORDER — HYDROCODONE BITARTRATE AND ACETAMINOPHEN 7.5; 325 MG/1; MG/1
1 TABLET ORAL
Qty: 12 TABLET | Refills: 0 | Status: SHIPPED | OUTPATIENT
Start: 2021-08-13 | End: 2021-08-16

## 2021-08-13 RX ORDER — PSEUDOEPHEDRINE HCL 30 MG
60 TABLET ORAL EVERY 6 HOURS
Status: DISCONTINUED | OUTPATIENT
Start: 2021-08-13 | End: 2021-08-13 | Stop reason: HOSPADM

## 2021-08-13 RX ADMIN — OXYCODONE HYDROCHLORIDE AND ACETAMINOPHEN 1 TABLET: 5; 325 TABLET ORAL at 14:21

## 2021-08-13 RX ADMIN — MORPHINE SULFATE 2 MG: 2 INJECTION, SOLUTION INTRAMUSCULAR; INTRAVENOUS at 06:38

## 2021-08-13 RX ADMIN — OXYCODONE HYDROCHLORIDE AND ACETAMINOPHEN 1 TABLET: 5; 325 TABLET ORAL at 08:59

## 2021-08-13 RX ADMIN — MORPHINE SULFATE 2 MG: 2 INJECTION, SOLUTION INTRAMUSCULAR; INTRAVENOUS at 02:40

## 2021-08-13 RX ADMIN — CLOPIDOGREL BISULFATE 75 MG: 75 TABLET ORAL at 08:54

## 2021-08-13 RX ADMIN — ASPIRIN 81 MG: 81 TABLET, COATED ORAL at 08:54

## 2021-08-13 RX ADMIN — PSEUDOEPHEDRINE HCL 60 MG: 30 TABLET, FILM COATED ORAL at 11:55

## 2021-08-13 RX ADMIN — OXYCODONE HYDROCHLORIDE AND ACETAMINOPHEN 1 TABLET: 5; 325 TABLET ORAL at 05:16

## 2021-08-13 RX ADMIN — POLYETHYLENE GLYCOL 3350 17 G: 17 POWDER, FOR SOLUTION ORAL at 08:54

## 2021-08-13 NOTE — PROGRESS NOTES
Vascular Surgery Progress Note  Lizet Yepezo ACNP-BC  8/13/2021       Subjective:     Mr. Massiel Dotson is a 72-year-old -American male with a past medical history significant for alcohol induced chronic pancreatitis, stroke, pre-diabetes, tobacco use, and duodenal ulcer with perforation. He has a history of bilateral lower extremity claudication. He is admitted to the hospital status post a right TCAR on 8/12/2021. His postprocedural course is complicated by asymptomatic bradycardia and postprocedural hypotension. This a.m. he is alert and up to the side of the bed. He continues on vasopressor support. His bradycardia remains asymptomatic. He denies hemispheric or vertebral symptoms.     Nursing Data:     Patient Vitals for the past 24 hrs:   BP Temp Pulse Resp SpO2 Weight   08/13/21 1201 100/62        08/13/21 1109 (!) 113/59 97.9 °F (36.6 °C) (!) 47 16 100 %    08/13/21 0730 121/89 97.7 °F (36.5 °C) (!) 45 14 99 %    08/13/21 0643      64.6 kg (142 lb 6.4 oz)   08/13/21 0400   (!) 43      08/13/21 0300 109/64 97.5 °F (36.4 °C) (!) 45 19 97 %    08/12/21 2348   (!) 47      08/12/21 2347 (!) 87/55  (!) 45      08/12/21 2318 107/81 98 °F (36.7 °C) (!) 50 11 99 %    08/12/21 2315 107/81  (!) 49 16     08/12/21 2302 (!) 111/54 97.7 °F (36.5 °C) (!) 47 14     08/12/21 2008 110/65  (!) 43      08/12/21 2000   (!) 41      08/12/21 1921 112/70  (!) 43      08/12/21 1918 112/70 97.7 °F (36.5 °C) (!) 48 21     08/12/21 1700 101/60  (!) 44 17     08/12/21 1645 91/64  (!) 46 13     08/12/21 1630 104/63  (!) 49 15     08/12/21 1615 111/63  (!) 47 17     08/12/21 1600 96/67  (!) 50 11     08/12/21 1505 120/62 98 °F (36.7 °C) (!) 50 18 100 %    08/12/21 1440 120/66  (!) 46 20 100 %    08/12/21 1435 125/66  (!) 47 19 100 %    08/12/21 1430 116/61  (!) 48 20 100 %    08/12/21 1425 120/66  (!) 49 13 98 %    08/12/21 1420 111/62  (!) 49 17 100 %  08/12/21 1415 114/66  (!) 51 17 100 %    08/12/21 1410 105/60  (!) 49 16 99 %    08/12/21 1405 (!) 90/59 97.9 °F (36.6 °C) (!) 54 16 99 %    08/12/21 1400 108/62  (!) 54 15 99 %    08/12/21 1355 117/65  (!) 52 15 97 %    08/12/21 1350 121/66  (!) 51 18 96 %    08/12/21 1345 120/65  (!) 52 15 96 %    08/12/21 1342 110/61  (!) 56 18 95 %    08/12/21 1337 95/60  (!) 57 17 97 %    08/12/21 1330 (!) 95/55  60 13 97 %    08/12/21 1315 125/71  (!) 59 15 99 %    08/12/21 1310   (!) 52 20 98 %    08/12/21 1305   (!) 59 20 98 %    08/12/21 1300 (!) 98/58  (!) 58 19 98 %    08/12/21 1255   (!) 59 9 98 %    08/12/21 1250 101/60  62 18 95 %    08/12/21 1245 105/65  64 11 100 %    08/12/21 1240 115/62 97.6 °F (36.4 °C) 65 14 100 %    08/12/21 1236 (!) 116/55              Intake/Output Summary (Last 24 hours) at 8/13/2021 1202  Last data filed at 8/13/2021 1201  Gross per 24 hour   Intake 1039.95 ml   Output 1510 ml   Net -470.05 ml       Exam:     Physical Exam  Constitutional:       Comments: Thin and chronically ill-appearing   HENT:      Head: Normocephalic. Nose: Nose normal.      Mouth/Throat:      Mouth: Mucous membranes are moist.   Eyes:      Pupils: Pupils are equal, round, and reactive to light. Cardiovascular:      Rate and Rhythm: Regular rhythm. Bradycardia present. Pulmonary:      Effort: Pulmonary effort is normal. No respiratory distress. Abdominal:      General: Abdomen is flat. There is no distension. Musculoskeletal:         General: Normal range of motion. Cervical back: Normal range of motion. Skin:     General: Skin is warm. Comments: Right subclavian incision is intact with Dermabond he does have a hematoma underlying the incision. Neurological:      General: No focal deficit present. Mental Status: He is alert. Mental status is at baseline.    Psychiatric:         Mood and Affect: Mood normal.         Behavior: Behavior normal. Lab Review:     . No results found for this or any previous visit (from the past 24 hour(s)). Assessment/Plan:     Symptomatic right carotid stenosis  -Status post TCAR 8/13/2021  TIA with previous history of stroke  -8/4/2021 w/ R eye vision loss   Right subclavian hematoma  -Stable  -H&H stable   · Continue aspirin, Plavix, and statin. · Normalized today. Encourage out of bed with all meals. Encourage incentive spirometry. Ambulate as tolerated.     Postprocedural hypotension  Asymptomatic bradycardia  · Antihypertensive home regimen held  · Initiate Sudafed  · Wean vasopressors as tolerated    Bilateral lower extremity claudication   · We will evaluate once recovered from TCAR    Hx of alcoholism  -last drink was 2 years ago  -Currently without signs or symptoms of withdrawal  Chronic pancreatitis    Tobacco use  -Smoking cessation education completed    Prediabetes  -Hemoglobin A1c 5.9 on 8/5/2021    VTE prophylaxis:  SCDs  SQ held for subclavian hematoma    Disposition:   Home

## 2021-08-13 NOTE — PROGRESS NOTES
Problem: Falls - Risk of  Goal: *Absence of Falls  Description: Document Sabine Orellana Fall Risk and appropriate interventions in the flowsheet.   Outcome: Resolved/Met  Note: Fall Risk Interventions:            Medication Interventions: Teach patient to arise slowly

## 2021-08-13 NOTE — PROGRESS NOTES
Reviewed discharge instructions and prescriptions with patient. Patient provided discharge instructions in [facility]. Patient taken to ED entrance via wheelchair for discharge.

## 2021-08-13 NOTE — DISCHARGE INSTRUCTIONS
Discharge Instructions  TCAR    Home care  Spend your first few days after surgery relaxing at home. It's OK to do quiet activities such as reading or watching TV. Take your medicines exactly as instructed. Dont skip doses. You may drive when you are no longer taking pain medication. It's OK to shower. Don't scrub your incision. Don't do strenuous activity for 7 to 10 days after your surgery. Dont lift anything heavier than 10 pounds for 2 to 3 weeks after your surgery. You may return to work after 2 weeks. Gradually increase your activity. It may take some time for you to return to your normal activities. Check your incision every day for signs of infection (redness, swelling, drainage, or warmth). Dont be alarmed if you have some loss of feeling along your jaw line, the incision line, and earlobe. This is a result of the incision and usually goes away after 6 to 12 months. When to call 911  A stroke is a medical emergency. Call 911 right away if you have any of these symptoms of stroke:    Weakness, tingling, or loss of feeling on one side of your face or body    Sudden double vision or trouble seeing in one or both eyes    Sudden trouble talking or slurred speech    Sudden, severe headache    F.A.S.T. is an easy way to remember the signs of stroke. When you see these signs, call 911 fast.    F.A.S.T. stands for:    F is for face drooping. One side of the face is drooping or numb. When the person smiles, the smile is uneven. A is for arm weakness. One arm is weak or numb. When the person lifts both arms at the same time, one arm may drift downward. S is for speech difficulty. You may notice slurred speech or trouble speaking. The person can't repeat a simple sentence correctly when asked. T is for time to call 911. If someone shows any of these symptoms, even if they go away, call 911 right away. Make note of the time the symptoms first appeared.     When to call your healthcare provider  Call your healthcare provider right away if you have any of the following:    Neck swelling    Redness, pain, swelling, or drainage from your incision    Fever above 100.4°F (38°C), or higher, or as advised    Call the office at [phone] if there are any problems. Long-term changes at home  Eat a healthy, low-fat, low cholesterol, and low calorie diet. Maintain your ideal body weight. After you have recovered from surgery, try to exercise more, especially walking. If you smoke, ask your primary doctor for help quitting.

## 2021-08-13 NOTE — PROGRESS NOTES
Transition of Care Plan:     RUR:11%  Disposition:Home with spouse  Follow up appointments: PCP and specialist  DME needed: No DME needed  Transportation at [address] or means to access home:      Family  IM Medicare Letter:N/A due to pt having Medicaid  Is patient a BCPI-A Bundle:    N/A                  If yes, was Bundle Letter given?:   No  Caregiver Contact:Holly- [phone]  Discharge Caregiver contacted prior to discharge? CM spoke to pt      Pt is medically stable for d/c from a  standpoint. RN informed. Care Management Interventions  PCP Verified by CM: Yes ([facility])  Mode of Transport at Discharge: Other (see comment) (Family to transport)  Hospital Transport Time of Discharge: 1800  Transition of Care Consult (CM Consult): Discharge Planning (Home with spouse)  Discharge Durable Medical Equipment: No (No DME reported )  Physical Therapy Consult: No  Occupational Therapy Consult: No  Speech Therapy Consult: No  Current Support Network: Lives with Spouse, Family Lives Nearby (Family is supportive )  Confirm Follow Up Transport: Self (Pt drive himself to his medical appointment or family)  Discharge Location  Discharge Placement: Home with family assistance    Logan Garcia.   Care Manager [facility]  [phone]

## 2021-08-13 NOTE — PROGRESS NOTES
Transition of Care Plan:    RUR:11%  Disposition:Home with spouse  Follow up appointments: PCP and specialist  DME needed: No DME needed  Transportation at [address] or means to access home:      Family  IM Medicare Letter:N/A due to pt having Medicaid  Is patient a BCPI-A Bundle:    N/A       If yes, was Bundle Letter given?:   No  Caregiver Contact:Holly- [phone]  Discharge Caregiver contacted prior to discharge? Reason for Readmission:     Right transcarotid artery revascularization         RUR Score/Risk Level:     11%    PCP: First and Last name:  Elizabeth Larkin Name of Practice:    Are you a current patient: Yes/No: Yes   Approximate date of last visit:  uncertain   Can you participate in a virtual visit with your PCP:  Yes    Is a Care Conference indicated:  No care conference indicated       Did you attend your follow up appointment (s): If not, why not:  No due to being admitted before appointment         Resources/supports as identified by patient/family:   Spouse/brother/neice/dtr       Top Challenges facing patient (as identified by patient/family and CM): Finances/Medication cost?     No concerns regarding finances/medication cost. Pt has Medicaid  Transportation     Family and pt drives   Support system or lack thereof? Family  Living arrangements? Lives with spouse in an one story home with no steps     Self-care/ADLs/Cognition? Pt is alert, verbal and oriented. Pt is independent in his ADLs and IADLs.        Current Advanced Directive/Advance Care Plan:      Advance Care Planning     General Advance Care Planning (ACP) Conversation      Date of Conversation: 8/13/2021  Conducted with: Patient with Decision Making Capacity    Healthcare Decision Maker:     Primary Decision Maker: Philly Spann - Other Relative - [phone]  Click here to complete [address] including selection of the Healthcare Decision Maker Relationship (ie \"Primary\")      Content/Action Overview:   DECLINED ACP conversation - will revisit periodically   Reviewed DNR/DNI and patient elects Full Code (Attempt Resuscitation)         Length of Voluntary ACP Conversation in minutes:  <16 minutes (Non-Billable)    [address] for utilizing home health:   No home health needed             Transition of Care Plan:    Based on readmission, the patient's previous Plan of Care   has been evaluated and/or modified. The current Transition of Care Plan is:       Plans are for pt to return home with his spouse. Care Management Interventions  PCP Verified by CM: Yes ([facility])  Mode of Transport at Discharge:  Other (see comment) (Family to transport)  Transition of Care Consult (CM Consult): Discharge Planning (Home with spouse)  Discharge Durable Medical Equipment: No (No DME reported )  Physical Therapy Consult: No  Occupational Therapy Consult: No  Speech Therapy Consult: No  Current Support Network: Lives with Spouse, Family Lives Nearby (Family is supportive )  Confirm Follow Up Transport: Self (Pt drive himself to his medical appointment or family)  Discharge Location  Discharge Placement: Home with family assistance    Readmission Assessment  Number of days since last admission?: 8-30 days  Previous disposition: Home with Family (Home with spouse)  Who is being interviewed?: Patient  What was the patient's/caregiver's perception as to why they think they needed to return back to the hospital?: Other (Comment) ([facility])  Did you visit your Primary Care Physician after you left the hospital, before you returned this time?: No  Why weren't you able to visit your PCP?: Other (Comment) (Readmitted before appointment)  Did you see a specialist, such as Cardiac, Pulmonary, Orthopedic Physician, etc. after you left the hospital?: Yes  Who advised the patient to return to the hospital?: Physician (Due to having a schedule procedure)  Does the patient report anything that got in the way of taking their medications?: No  In our efforts to provide the best possible care to you and others like you, can you think of anything that we could have done to help you after you left the hospital the first time, so that you might not have needed to return so soon?: Other (Comment) (No voiced)    Jhon Gomez.   Care Manager [facility]  [phone]

## 2021-08-13 NOTE — PROGRESS NOTES
Problem: Falls - Risk of  Goal: *Absence of Falls  Description: Document Dayo Nicole Fall Risk and appropriate interventions in the flowsheet.   Outcome: Progressing Towards Goal  Note: Fall Risk Interventions:            Medication Interventions: Teach patient to arise slowly

## 2021-08-13 NOTE — DISCHARGE SUMMARY
Vascular Surgery Discharge Summary     Patient ID:  Maretta Mcardle  [id number]  6 y.o.  1960    Admitting Provider: Margaret Sands MD  Discharging Provider: Karen Cheng MD     Admit Date: 8/12/2021    Discharge Date: 8/13/2021    Discharge Diagnoses:    Symptomatic right ICA stenosis POA  Recent history of TIA  History of right hemispheric stroke  Bilateral lower extremity claudication  Alcohol induced pancreatitis  -Last drink was 2 years ago  Prediabetes  Tobacco use  History of duodenal ulcer with perforation    Procedure(s):  RIGHT TRANSCAROTID ARTERY REVASCULARIZATION  08/12/2021    Hospital Course:   Mr. Helen Barber is a 62-year-old -American male with a past medical history significant for alcohol induced chronic pancreatitis, stroke, pre-diabetes, tobacco use, and duodenal ulcer with perforation. He quit again 2 years ago. He has a history of bilateral lower extremity claudication. He is admitted to the hospital status post a right TCAR on 8/12/2021. His postprocedural course was complicated by asymptomatic bradycardia and postprocedural hypotension that improved with Sudafed administration. .    Pertinent Results:   No results found for this or any previous visit (from the past 24 hour(s)).     Vital signs: Patient Vitals for the past 24 hrs:   BP Temp Pulse Resp SpO2 Weight   08/13/21 1531 (!) 97/59  (!) 53 20     08/13/21 1500 (!) 100/56  (!) 54 14     08/13/21 1445 (!) 95/56  (!) 54 18     08/13/21 1430 113/66  (!) 54 11     08/13/21 1201 100/62        08/13/21 1109 (!) 113/59 97.9 °F (36.6 °C) (!) 47 16 100 %    08/13/21 0730 121/89 97.7 °F (36.5 °C) (!) 45 14 99 %    08/13/21 0643      64.6 kg (142 lb 6.4 oz)   08/13/21 0400   (!) 43      08/13/21 0300 109/64 97.5 °F (36.4 °C) (!) 45 19 97 %    08/12/21 2348   (!) 47      08/12/21 2347 (!) 87/55  (!) 45      08/12/21 2318 107/81 98 °F (36.7 °C) (!) 50 11 99 %    08/12/21 2315 107/81  (!) 49 16     08/12/21 2302 (!) 111/54 97.7 °F (36.5 °C) (!) 47 14     08/12/21 2008 110/65  (!) 43      08/12/21 2000   (!) 41      08/12/21 1921 112/70  (!) 43      08/12/21 1918 112/70 97.7 °F (36.5 °C) (!) 48 21     08/12/21 1700 101/60  (!) 44 17     08/12/21 1645 91/64  (!) 46 13     08/12/21 1630 104/63  (!) 49 15     08/12/21 1615 111/63  (!) 47 17     08/12/21 1600 96/67  (!) 50 11         Patient Weight:   Last 3 Recorded Weights in this Encounter    08/12/21 0800 08/13/21 0643   Weight: 63.4 kg (139 lb 12.4 oz) 64.6 kg (142 lb 6.4 oz)       Consults: None    Patient Condition at Discharge: stable    Disposition: home    Patient Instructions:   Current Discharge Medication List      START taking these medications    Details   HYDROcodone-acetaminophen (NORCO) 7.5-325 mg per tablet Take 1 Tablet by mouth every six (6) hours as needed for Pain for up to 3 days. Max Daily Amount: 4 Tablets. Qty: 12 Tablet, Refills: 0    Associated Diagnoses: Carotid stenosis, symptomatic w/o infarct, right         CONTINUE these medications which have CHANGED    Details   amLODIPine (NORVASC) 5 mg tablet Take 1 Tablet by mouth daily. RESTART ON SUNDAY  Qty: 30 Tablet, Refills: 0         CONTINUE these medications which have NOT CHANGED    Details   clopidogreL (PLAVIX) 75 mg tab Take 1 Tablet by mouth daily. Qty: 75 Tablet, Refills: 0      aspirin delayed-release 81 mg tablet Take 1 Tablet by mouth daily. Qty: 30 Tablet, Refills: 0      rosuvastatin (CRESTOR) 20 mg tablet Take 1 Tablet by mouth nightly. Qty: 30 Tablet, Refills: 0      polyethylene glycol (MIRALAX) 17 gram packet Take 1 Packet by mouth daily as needed for Constipation. Qty: 30 Packet, Refills: 0      nicotine (NICODERM CQ) 14 mg/24 hr patch 1 Patch by TransDERmal route daily for 30 days.   Qty: 30 Patch, Refills: 0             Diet: Diabetic/Cardiac Diet    Activity/Wound Care:   Discharge Instructions  Paige care  Spend your first few days after surgery relaxing at home. It's OK to do quiet activities such as reading or watching TV.     Take your medicines exactly as instructed. Dont skip doses.     You may drive when you are no longer taking pain medication.      It's OK to shower. Don't scrub your incision.     Don't do strenuous activity for 7 to 10 days after your surgery.     Dont lift anything heavier than 10 pounds for 2 to 3 weeks after your surgery.     You may return to work after 2 weeks.       Gradually increase your activity. It may take some time for you to return to your normal activities.     Check your incision every day for signs of infection (redness, swelling, drainage, or warmth).     Dont be alarmed if you have some loss of feeling along your jaw line, the incision line, and earlobe. This is a result of the incision and usually goes away after 6 to 12 months.     When to call 911  A stroke is a medical emergency. Call 911 right away if you have any of these symptoms of stroke:     Weakness, tingling, or loss of feeling on one side of your face or body     Sudden double vision or trouble seeing in one or both eyes     Sudden trouble talking or slurred speech     Sudden, severe headache     F. A.S.T. is an easy way to remember the signs of stroke. When you see these signs, call 911 fast.     F. A.S.T. stands for:     F is for face drooping. One side of the face is drooping or numb. When the person smiles, the smile is uneven.     A is for arm weakness. One arm is weak or numb. When the person lifts both arms at the same time, one arm may drift downward.     S is for speech difficulty. You may notice slurred speech or trouble speaking. The person can't repeat a simple sentence correctly when asked.     T is for time to call 911. If someone shows any of these symptoms, even if they go away, call 911 right away.  Make note of the time the symptoms first appeared.     When to call your healthcare provider  Call your healthcare provider right away if you have any of the following:     Neck swelling     Redness, pain, swelling, or drainage from your incision     Fever above 100.4°F (38°C), or higher, or as advised     Call the office at [phone] if there are any problems.     Long-term changes at home  Eat a healthy, low-fat, low cholesterol, and low calorie diet.      Maintain your ideal body weight.     After you have recovered from surgery, try to exercise more, especially walking.      If you smoke, ask your primary doctor for help quitting.      Follow-up Information     Follow up With Specialties Details Why Contact Info    William Marshall MD Vascular Surgery In 2 weeks  [address] [place] [address]  [phone]      Echo Dan MD Internal Medicine On 8/17/2021 For hospital follow up appointment at 11:30AM  [address] [place] [address] [phone]            Signed:  Teagan Kraft NP  8/13/2021  3:47 PM

## 2021-08-16 NOTE — OP NOTES
[address]  OPERATIVE REPORT    Name:  June Buchanan  MR#:  [id number]  :  1960  ACCOUNT #:  [de-identified]  DATE OF SERVICE:  2021    PREOPERATIVE DIAGNOSIS:  Symptomatic right carotid stenosis without infarction. POSTOPERATIVE DIAGNOSIS:  Symptomatic right carotid stenosis without infarction. PROCEDURE PERFORMED:  Right transcarotid artery revascularization. SURGEON:  Yuliya Bey MD    ASSISTANT:  None. ANESTHESIA:  General.    COMPLICATIONS:  None. SPECIMENS REMOVED:  None. IMPLANTS:  An 8 x 40 Enroute transcarotid stent by [facility] .    ESTIMATED BLOOD LOSS:  Less than 50 mL. DRAINS:  None. INDICATIONS:  The patient is a 62-year-old smoker with tandem severe right internal carotid artery stenosis with transient ischemic attacks. The distal of the two stenoses is extremely high and would require clamping at or above the mid second cervical vertebral level. The patient is therefore poor candidate for endarterectomy and presents for transcarotid artery revascularization of his severe right internal carotid artery stenosis. PROCEDURE:  After informed consent was obtained, the patient was given intravenous antibiotics within one hour of the incision. He was taken to the operating room and after induction of adequate general anesthesia, the right neck and left groin were prepped and draped as a sterile field. Through a small transverse incision at the base of the right neck, the platysma was divided with electrocautery and dissection was carried down between the heads of the sternomastoid muscle and the common carotid artery was dissected free and encircled with a vessel loop taking care to avoid the vagus nerve. A 5-0 Prolene pursestring suture was placed on the anterior wall of the common carotid artery.   Under ultrasound guidance, micropuncture access was obtained of the left common femoral vein and the venous return sheath was placed without difficulty and secured with a silk suture. The patient was systemically heparinized and the ACT was maintained at greater than 240 for the remainder of the case. Micropuncture access was obtained to the right common artery in the center of the pursestring suture and the micropuncture sheath was inserted to a depth of 2.5 cm. A right carotid arteriogram was performed which showed the tandem 90% stenosis in the internal carotid artery. The external carotid artery was then selected with the microwire and the microsheath was advanced up into the external carotid and then removed over the 0.035 wire. The 8-Dutch arterial sheath was then inserted into the common carotid artery and secured in place with a 2-0 silk suture. The flow reversal circuit was then connected and partial flow reversal was begun. The appropriate balloons and stents were placed on the field and prepped. Adequate blood pressure and heart rate were confirmed as well as an adequate ACT. Next, the common carotid artery proximal to the arterial sheath was occluded with a vessel loop and flow reversal was confirmed by flushing the venous sheath and observing the washout. A repeat right carotid arteriogram was then performed and the carotid bifurcation and the stenoses all were marked. The severe internal carotid artery stenoses were then crossed with a [address] wire. The stenoses were angioplastied with a 5 x 40 balloon and then stented with an 8 x 40 Enroute self-expanding transcarotid stent by [facility].  A 2-minute washout was allowed on flow reversal and then repeat right carotid arteriogram was done, which showed good position of the stent but an area of some residual stenosis and so the stent was postdilated with a 5 x 40 balloon.   The stent profile clearly improved and another 2-minute washout on flow reversal was allowed and then repeat right carotid arteriograms were done in 2 different projections and this showed no residual stenosis with good position of the stent. The wire was removed and flow reversal was stopped. Blood was returned through the venous sheath and then the arterial sheath was removed and the puncture site was controlled with the existing 5-0 Prolene pursestring suture with good hemostasis. The venous sheath was removed and hemostasis was achieved with manual pressure. The neck wound was irrigated and was hemostatic after a partial reversal dose of protamine. The neck incision was closed with 3-0 Vicryl suture in the platysma and 4-0 Monocryl subcuticular suture. Dermabond was applied to the neck incision and the left groin puncture site. The patient was extubated and transferred to the PACU in stable condition with no neurologic deficits. The flow reversal time was 9 minutes. Contrast volume was 15 mL. Fluoroscopy time was 3 minutes, radiation dose area was 9.62 gray per centimeter squared. Procedure time was 54 minutes.         Catrachita Stratton MD      [id number]  D:  08/16/2021 2:30  T:  08/16/2021 5:59  JOB #:  [id number]

## 2021-08-17 ENCOUNTER — OFFICE VISIT (OUTPATIENT)
Dept: FAMILY MEDICINE CLINIC | Age: 61
End: 2021-08-17
Payer: MEDICAID

## 2021-08-17 VITALS
OXYGEN SATURATION: 97 % | DIASTOLIC BLOOD PRESSURE: 52 MMHG | HEART RATE: 66 BPM | SYSTOLIC BLOOD PRESSURE: 99 MMHG | RESPIRATION RATE: 20 BRPM | WEIGHT: 142 LBS | HEIGHT: 71 IN | BODY MASS INDEX: 19.88 KG/M2 | TEMPERATURE: 98.1 F

## 2021-08-17 DIAGNOSIS — Z98.890 S/P VASCULAR SURGERY: ICD-10-CM

## 2021-08-17 DIAGNOSIS — Z09 HOSPITAL DISCHARGE FOLLOW-UP: Primary | ICD-10-CM

## 2021-08-17 PROCEDURE — 99213 OFFICE O/P EST LOW 20 MIN: CPT | Performed by: INTERNAL MEDICINE

## 2021-08-17 NOTE — PROGRESS NOTES
Chief Complaint   Patient presents with   [facility] Follow Up     HPI:  Julieth Carroll is a 64 y.o. male with h/o symptomatic right ICA stenosis, recent TIA, right hemispheric stroke, bilateral lower extremity claudication, lcohol induced pancreatitis, tobacco use,  duodenal ulcer with perforation presents for hospital follow up. Patient was admitted to [facility] 8/12/21-8/13/21. He underwent Right Trans carotid Artery Revascularization 08/12/2021 with Dr. Govind Peace. Today he says he is doing well. He has no complaints. Right sided neck incision site clean.     Review of Systems  As per hpi    Past Medical History:   Diagnosis Date    Current smoker     Hypertension     Pancreatitis     Pancreatitis, chronic (HCC)     Pseudocyst of pancreas     PUD (peptic ulcer disease)      Past Surgical History:   Procedure Laterality Date    HX CHOLECYSTECTOMY      HX HEENT      deviated septum    HX ORTHOPAEDIC      jaw surgery with plates/screws post trauma    HX OTHER SURGICAL      \"pancrease surgery\"    HX UROLOGICAL      urethra surgery post trauma    UPPER GI ENDOSCOPY,BIOPSY  8/19/2020         UPPER GI ENDOSCOPY,BIOPSY  8/28/2020          Social History     Socioeconomic History    Marital status: SINGLE     Spouse name: Not on file    Number of children: Not on file    Years of education: Not on file    Highest education level: Not on file   Tobacco Use    Smoking status: Current Every Day Smoker     Packs/day: 0.50     Years: 42.00     Pack years: 21.00    Smokeless tobacco: Never Used   Vaping Use    Vaping Use: Never used   Substance and Sexual Activity    Alcohol use: Not Currently    Drug use: Yes     Types: Marijuana     Comment: daily last use 08/11/21     Social Determinants of Health     Financial Resource Strain:     Difficulty of Paying Living Expenses:    Food Insecurity:     Worried About Running Out of Food in the Last Year:     Josh of Food in the Last Year:    Transportation Needs:  Lack of Transportation (Medical):  Lack of Transportation (Non-Medical):    Physical Activity:     Days of Exercise per Week:     Minutes of Exercise per Session:    Stress:     Feeling of Stress :    Social Connections:     Frequency of Communication with Friends and Family:     Frequency of Social Gatherings with Friends and Family:     Attends [employer] Services:     Active Member of Clubs or Organizations:     Attends Club or Organization Meetings:     Marital Status:      Family History   Problem Relation Age of Onset    Other Mother         carotid stenosis    Diabetes Father     Depression Sister     Psychiatric Disorder Brother      Current Outpatient Medications   Medication Sig Dispense Refill    amLODIPine (NORVASC) 5 mg tablet Take 1 Tablet by mouth daily. RESTART ON SUNDAY 30 Tablet 0    nicotine (NICODERM CQ) 14 mg/24 hr patch 1 Patch by TransDERmal route daily for 30 days. 30 Patch 0    clopidogreL (PLAVIX) 75 mg tab Take 1 Tablet by mouth daily. 75 Tablet 0    aspirin delayed-release 81 mg tablet Take 1 Tablet by mouth daily. 30 Tablet 0    rosuvastatin (CRESTOR) 20 mg tablet Take 1 Tablet by mouth nightly. 30 Tablet 0    polyethylene glycol (MIRALAX) 17 gram packet Take 1 Packet by mouth daily as needed for Constipation.  30 Packet 0     No Known Allergies    Objective:  Visit Vitals  BP (!) 99/52   Pulse 66   Temp 98.1 °F (36.7 °C) (Oral)   Resp 20   Ht 5' 11\" (1.803 m)   Wt 142 lb (64.4 kg)   SpO2 97%   BMI 19.80 kg/m²     Physical Exam:   General appearance - alert, well appearing in no distress  Mental status - alert, oriented to person, place, and time  Neck - supple, dressing applied to incision   Chest - clear to auscultation, no wheezes, rales or rhonchi  Heart - normal rate, regular rhythm, no murmurs  Abdomen - soft, nontender, nondistended, no organomegaly  Ext-peripheral pulses normal, no pedal edema  Neuro - no focal findings    Results for orders placed or performed during the hospital encounter of 08/09/21   CBC W/O DIFF   Result Value Ref Range    WBC 13.7 (H) 4.1 - 11.1 K/uL    RBC 5.47 4.10 - 5.70 M/uL    HGB 15.1 12.1 - 17.0 g/dL    HCT 47.9 36.6 - 50.3 %    MCV 87.6 80.0 - 99.0 FL    MCH 27.6 26.0 - 34.0 PG    MCHC 31.5 30.0 - 36.5 g/dL    RDW 15.1 (H) 11.5 - 14.5 %    PLATELET 749 398 - 232 K/uL    MPV 9.4 8.9 - 12.9 FL    NRBC 0.0 0  WBC    ABSOLUTE NRBC 0.00 0.00 - 9.28 K/uL   METABOLIC PANEL, COMPREHENSIVE   Result Value Ref Range    Sodium 139 136 - 145 mmol/L    Potassium 3.8 3.5 - 5.1 mmol/L    Chloride 106 97 - 108 mmol/L    CO2 32 21 - 32 mmol/L    Anion gap 1 (L) 5 - 15 mmol/L    Glucose 92 65 - 100 mg/dL    BUN 10 6 - 20 MG/DL    Creatinine 0.91 0.70 - 1.30 MG/DL    BUN/Creatinine ratio 11 (L) 12 - 20      GFR est AA >60 >60 ml/min/1.73m2    GFR est non-AA >60 >60 ml/min/1.73m2    Calcium 8.8 8.5 - 10.1 MG/DL    Bilirubin, total 0.3 0.2 - 1.0 MG/DL    ALT (SGPT) 20 12 - 78 U/L    AST (SGOT) 18 15 - 37 U/L    Alk.  phosphatase 96 45 - 117 U/L    Protein, total 7.3 6.4 - 8.2 g/dL    Albumin 3.5 3.5 - 5.0 g/dL    Globulin 3.8 2.0 - 4.0 g/dL    A-G Ratio 0.9 (L) 1.1 - 2.2     PROTHROMBIN TIME + INR   Result Value Ref Range    INR 1.0 0.9 - 1.1      Prothrombin time 10.6 9.0 - 11.1 sec   PTT   Result Value Ref Range    aPTT 25.9 22.1 - 31.0 sec    aPTT, therapeutic range     58.0 - 77.0 SECS   URINALYSIS W/ REFLEX CULTURE    Specimen: Urine   Result Value Ref Range    Color YELLOW/STRAW      Appearance CLEAR CLEAR      Specific gravity 1.012 1.003 - 1.030      pH (UA) 7.0 5.0 - 8.0      Protein Negative NEG mg/dL    Glucose Negative NEG mg/dL    Ketone Negative NEG mg/dL    Bilirubin Negative NEG      Blood Negative NEG      Urobilinogen 1.0 0.2 - 1.0 EU/dL    Nitrites Negative NEG      Leukocyte Esterase Negative NEG      WBC 0-4 0 - 4 /hpf    RBC 0-5 0 - 5 /hpf    Epithelial cells FEW FEW /lpf    Bacteria Negative NEG /hpf    UA:UC IF INDICATED CULTURE NOT INDICATED BY UA RESULT CNI      Hyaline cast 0-2 0 - 5 /lpf   TYPE & SCREEN   Result Value Ref Range    Crossmatch Expiration 08/15/2021,2359     ABO/Rh(D) B POSITIVE     Antibody screen NEG      Assessment/Plan:  Diagnoses and all orders for this visit:    Hospital discharge follow-up  -     REFERRAL TO VASCULAR SURGERY    S/P vascular surgery  -     REFERRAL TO VASCULAR SURGERY      Patient Instructions          Rob Blackwood: Qué esperar en el hogar  Carotid Angiogram: What to Expect at Home  Bruno recuperación     [place] es un examen (también llamado procedimiento) para observar los grandes vasos sanguíneos del young que llevan laura al cerebro (arterias carótidas). El médico le insertó un tubo felix y flexible (catéter) dentro de un vaso sanguíneo en la keith. En algunos casos, el tagUin pone el catéter en un vaso sanguíneo en el brazo o el hombro. Podría hacerse william examen para saber si alguna arteria carótida se ha estrechado o está obstruida. Podría tener un moretón o dolor en la keith o el brazo por manoj o dos días después de la Rob Giraldoruthyamileth. Puede hacer actividades suaves en bruno hogar chandler nada intenso lisa varios días. Esta hoja de [employer] idea general del tiempo que le llevará recuperarse. Sin embargo, cada persona se recupera a un ritmo diferente. Siga los pasos que se mencionan a continuación para recuperarse lo más rápido posible. ¿Cómo puede cuidarse en el hogar? Actividad    · Evite levantar cosas pesadas que [place] que se esfuerce. [place] puede incluir bolsas pesadas del supermercado y recipientes de [place], un maletín o ministerio mochila pesados, bolsas de arena para gatos o de alimento para perros, ministerio aspiradora o un fabiana.     · Trate de no subir [employer] primeros días si le colocaron el catéter en la arteria de la keith.     · Trate de caminar cada día. Comience caminando un poquito más que el día anterior.  Poco a poco, aumente la distancia. Caminar estimula la circulación de la laura y ayuda a prevenir neumonía y estreñimiento. Alimentación    · Ayah abundante líquido para ayudar a schwartz cuerpo a eliminar el material de contraste. Si tiene [facility], el corazón o el hígado y tiene que [place]'s líquidos, hable con schwartz médico antes de aumentar la cantidad de líquidos que heydi.     · Siga ministerio dieta saludable para el corazón, que incluya muchas frutas, verduras y granos integrales. Si necesita ayuda con la dieta, hable con schwartz médico. También sería conveniente consultar con un dietista. Vee experto puede ayudarlo a aprender sobre alimentos saludables y planificar las comidas. Medicamentos    · Schwartz médico le dirá si puede volver a ying christina medicamentos y cuándo puede volver a hacerlo. También le dará indicaciones sobre cualquier medicamento nuevo que deba ying usted.     · Si heydi aspirina o cualquier otro medicamento que previene los coágulos de [place], pregúntele a schwartz médico si debería volver a tomarlo y en qué momento. Asegúrese de entender exactamente lo que schwartz médico quiere que shukri.     · Es posible que schwartz médico le recete un medicamento para prevenir la formación de coágulos de [place], jitendra la aspirina o el clopidogrel (Plavix). Es muy importante que tome estos medicamentos exactamente jitendra le fueron recetados para mantener la arteria carótida [place] y reducir el riesgo de un ataque cerebral.     · [place] los medicamentos exactamente jitendra le fueron recetados. Llame a schwartz médico si rd estar teniendo problemas con schwartz medicamento. Cuidado del sitio del catéter    · Mantenga un vendaje sobre el sitio en donde le colocaron el catTherative of [employer] primeros 3 laura.     · Colóquese hielo o ministerio compresa fría sobre la kathya de 10 a 20 minutos cada vez para ayudar con el dolor o la hinchazón.  Póngase un paño felix entre el hielo y la piel.     · Puede ducharse entre 24 y 50 horas después del procedimiento, si schwartz médico lo autoriza. Séquese la incisión con toques suaves de toalla.     · No sumerja el sitio del catéter [employer] haya sanado. No se bañe en ministerio merlene por 1 semana o hasta que schwartz médico le diga que puede [place].     · Preste atención a si sale laura del sitio. Ministerio pequeña cantidad de laura (hasta el tamaño de ministerio moneda de 25 centavos) en el vendaje puede ser normal.     · Si le sale [place], recuéstese y presione la kathya por 15 minutos para tratar de detener el sangrado. Si el sangrado no se detiene, llame a schwartz médico o consiga atención médica de inmediato. La atención de seguimiento es ministerio parte clave de schwartz tratamiento y seguridad. Asegúrese de hacer y acudir a todas las citas, y llame a schwartz médico si está teniendo problemas. También es ministerio buena idea saber los resultados de christina exámenes y mantener ministerio lista de los medicamentos que heydi. ¿Cuándo debe pedir ayuda? Llame al 911 en cualquier momento que considere que necesita atención de [place]. Por ejemplo, llame si:    · Se desmayó (perdió el conocimiento).     · Tiene graves dificultades para respirar.     · Tiene dolor repentino en el pecho y falta de aire, o tose laura.     · Tiene síntomas de un ataque cerebral. Estos pueden incluir:  ? Entumecimiento, hormigueo, debilidad o parálisis repentinos en la tray, el brazo o la pierna, sobre todo si ocurre en un solo lado del cuerpo. ? Cambios súbitos en la vista. ? Problemas repentinos para hablar. ? Confusión súbita o dificultad repentina para comprender frases sencillas. ? Problemas repentinos para caminar o mantener el equilibrio. ? Un dolor de gilma intenso y repentino, distinto a los kathy de [place]. Llame a schwartz médico ahora mismo o busque atención médica inmediata si:    · Le sangra la kathya donde le colocaron el catéter en la arteria.     · Tiene un bulto doloroso que crece con rapidez en el sitio del catéter.     · Tiene señales de infección, jitendra:  ?  Aumento del dolor, la hinchazón, el enrojecimiento o la temperatura. ? Vetas rojizas que salen del sitio del catéter. ? Pus que sale del sitio del catéter. ? Inocencia [place].     · La pierna, el brazo o la mano le duele, se ve de color [place], o la extremidad se siente fría o con entumecimiento u hormigueo. Preste especial atención a los cambios en schwartz jun y asegúrese de comunicarse con schwartz médico si tiene algún problema. ¿Dónde puede encontrar más información en inglés? Vaya a [email or web address]  Brittanie [id number] en la búsqueda para aprender más acerca de \"Angiografía carotídea: Qué esperar en el hogar. \"  Revisado: 4 marzo, 2020               Versión del contenido: 12.8  © 2006-2021 Healthwise, Incorporated. Las instrucciones de cuidado fueron adaptadas bajo licencia por [employer] (which disclaims liability or warranty for this information). Si usted tiene [place] afección médica o sobre estas instrucciones, siempre pregunte a schwartz profesional de jun. Healthwise, Incorporated niega toda garantía o responsabilidad por schwartz uso de esta información. Follow-up and Dispositions    · Return in about 3 months (around 11/17/2021), or if symptoms worsen or fail to improve, for routine follow up.

## 2021-08-17 NOTE — PATIENT INSTRUCTIONS
[place]: Qué esperar en el hogar  Carotid Angiogram: What to Expect at Home  Schwartz recuperación     Lorayne Fells es un examen (también llamado procedimiento) para observar los grandes vasos sanguíneos del young que llevan laura al cerebro (arterias carótidas). El médico le insertó un tubo felix y flexible (catéter) dentro de un vaso sanguíneo en la keith. En algunos casos, el Thename.is pone el catéter en un vaso sanguíneo en el brazo o el hombro. Podría hacerse william examen para saber si alguna arteria carótida se ha estrechado o está obstruida. Podría tener un moretón o dolor en la keith o el brazo por manoj o dos días después de la Jovanni Elizabeth. Puede hacer actividades suaves en schwartz hogar chandler nada intenso lisa varios días. Esta hoja de [employer] idea general del tiempo que le llevará recuperarse. Sin embargo, cada persona se recupera a un ritmo diferente. Siga los pasos que se mencionan a continuación para recuperarse lo más rápido posible. Cómo puede cuidarse en el hogar? Actividad    · Evite levantar cosas pesadas que [place] que se esfuerce. [place] puede incluir bolsas pesadas del supermercado y recipientes de [place], un maletín o ministerio mochila pesados, bolsas de arena para gatos o de alimento para perros, ministerio aspiradora o un fabiana.     · Trate de no subir [place] primeros días si le colocaron el catéter en la arteria de la keith.     · Trate de caminar cada día. Comience caminando un poquito más que el día anterior. Poco a poco, aumente la distancia. Caminar estimula la circulación de la laura y ayuda a prevenir neumonía y estreñimiento. Alimentación    · Ayah abundante líquido para ayudar a schwartz cuerpo a eliminar el material de contraste.  Si tiene [employer] [facility] [employer], el corazón o el hígado y tiene que Jacqueline's líquidos, hable con schwartz médico antes de aumentar la cantidad de líquidos que heydi.     · Siga ministerio dieta saludable para el corazón, que incluya muchas frutas, verduras y granos integrales. Si necesita ayuda con la dieta, hable con schwartz médico. También sería conveniente consultar con un dietista. Vee experto puede ayudarlo a aprender sobre alimentos saludables y planificar las comidas. Medicamentos    · Schwartz médico le dirá si puede volver a ying christina medicamentos y cuándo puede volver a hacerlo. También le dará indicaciones sobre cualquier medicamento nuevo que deba ying usted.     · Si heydi aspirina o cualquier otro medicamento que previene los coágulos de [place], pregúntele a schwartz médico si debería volver a tomarlo y en qué momento. Asegúrese de entender exactamente lo que schwartz médico quiere que shukri.     · Es posible que schwartz médico le recete un medicamento para prevenir la formación de coágulos de [place], jitendra la aspirina o el clopidogrel (Plavix). Es muy importante que tome estos medicamentos exactamente jitendra le fueron recetados para mantener la arteria carótida [place] y reducir el riesgo de un ataque cerebral.     · [place] los medicamentos exactamente jitendra le fueron recetados. Llame a schwartz médico si rd estar teniendo problemas con schwartz medicamento. Cuidado del sitio del catéter    · Mantenga un vendaje sobre el sitio en donde le colocaron el catéter [employer] [facility] [employer] primeros 3 laura.     · Colóquese hielo o key compresa fría sobre la kathya de 10 a 20 minutos cada vez para ayudar con el dolor o la hinchazón. Póngase un paño felix entre el hielo y la piel.     · Puede ducharse entre 24 y 50 horas después del procedimiento, si schwartz médico lo autoriza. Séquese la incisión con toques suaves de toalla.     · No sumerja el sitio del catéter [employer] haya sanado. No se bañe en key merlene por 1 semana o hasta que schwartz médico le diga que puede [place].     · Preste atención a si sale laura del sitio.  Key pequeña cantidad de laura (hasta el tamaño de key moneda de 25 centavos) en el vendaje puede ser normal.     · Si le sale [place], recuéstese y presione la kathya por 15 minutos para tratar de detener el sangrado. Si el sangrado no se detiene, llame a schwartz médico o consiga atención médica de inmediato. La atención de seguimiento es ministerio parte clave de schwartz tratamiento y seguridad. Asegúrese de hacer y acudir a todas las citas, y llame a schwartz médico si está teniendo problemas. También es ministerio buena idea saber los resultados de christina exámenes y mantener ministerio lista de los medicamentos que heydi. Cuándo debe pedir ayuda? Llame al 911 en cualquier momento que considere que necesita atención de [place]. Por ejemplo, llame si:    · Se desmayó (perdió el conocimiento).     · Tiene graves dificultades para respirar.     · Tiene dolor repentino en el pecho y falta de aire, o tose laura.     · Tiene síntomas de un ataque cerebral. Estos pueden incluir:  ? Entumecimiento, hormigueo, debilidad o parálisis repentinos en la tray, el brazo o la pierna, sobre todo si ocurre en un solo lado del cuerpo. ? Cambios súbitos en la vista. ? Problemas repentinos para hablar. ? Confusión súbita o dificultad repentina para comprender frases sencillas. ? Problemas repentinos para caminar o mantener el equilibrio. ? Un dolor de gilma intenso y repentino, distinto a los kathy de Vineet Mei. Llame a schwartz médico ahora mismo o busque atención médica inmediata si:    · Le sangra la kathya donde le colocaron el catéter en la arteria.     · Tiene un bulto doloroso que crece con rapidez en el sitio del catéter.     · Tiene señales de infección, jitendra:  ? Aumento del dolor, la hinchazón, el enrojecimiento o la temperatura. ? Vetas rojizas que salen del sitio del catéter. ? Pus que sale del sitio del catéter. ? Shannan Adamson.     · La pierna, el brazo o la mano le duele, se ve de color [place], o la extremidad se siente fría o con entumecimiento u hormigueo. Preste especial atención a los cambios en schwartz jun y asegúrese de comunicarse con schwartz médico si tiene algún problema.   
Dónde puede encontrar más información en inglés? Vaya a [email or web address]  Brittanie [id number] en la búsqueda para aprender más acerca de \"Angiografía carotídea: Qué esperar en el hogar. \"  Revisado: 4 marzo, 2020               Versión del contenido: 12.8  © 2006-2021 Healthwise, [employer]. Las instrucciones de cuidado fueron adaptadas bajo licencia por [employer] [facility] [employer] (which disclaims liability or warranty for this information). Si usted tiene [place] afección médica o sobre estas instrucciones, siempre pregunte a schwartz profesional de jun. [employer] niega toda garantía o responsabilidad por schwartz uso de esta información.

## 2021-11-18 ENCOUNTER — OFFICE VISIT (OUTPATIENT)
Dept: FAMILY MEDICINE CLINIC | Age: 61
End: 2021-11-18
Payer: MEDICAID

## 2021-11-18 VITALS
SYSTOLIC BLOOD PRESSURE: 118 MMHG | HEIGHT: 71 IN | BODY MASS INDEX: 20.13 KG/M2 | OXYGEN SATURATION: 99 % | TEMPERATURE: 98.6 F | HEART RATE: 81 BPM | WEIGHT: 143.8 LBS | RESPIRATION RATE: 16 BRPM | DIASTOLIC BLOOD PRESSURE: 65 MMHG

## 2021-11-18 DIAGNOSIS — Z12.5 SCREENING FOR PROSTATE CANCER: ICD-10-CM

## 2021-11-18 DIAGNOSIS — K26.5 DU (PERFORATED DUODENAL ULCER) (HCC): ICD-10-CM

## 2021-11-18 DIAGNOSIS — R73.03 BORDERLINE DIABETES MELLITUS: ICD-10-CM

## 2021-11-18 DIAGNOSIS — I63.00 CEREBROVASCULAR ACCIDENT (CVA) DUE TO THROMBOSIS OF PRECEREBRAL ARTERY (HCC): ICD-10-CM

## 2021-11-18 DIAGNOSIS — E55.9 VITAMIN D DEFICIENCY: ICD-10-CM

## 2021-11-18 DIAGNOSIS — E78.00 HYPERCHOLESTEROLEMIA: ICD-10-CM

## 2021-11-18 DIAGNOSIS — R35.0 FREQUENCY OF URINATION: ICD-10-CM

## 2021-11-18 DIAGNOSIS — Z12.11 COLON CANCER SCREENING: ICD-10-CM

## 2021-11-18 DIAGNOSIS — Z12.2 ENCOUNTER FOR SCREENING FOR LUNG CANCER: ICD-10-CM

## 2021-11-18 DIAGNOSIS — Z23 NEEDS FLU SHOT: ICD-10-CM

## 2021-11-18 DIAGNOSIS — I10 HYPERTENSION, WELL CONTROLLED: Primary | ICD-10-CM

## 2021-11-18 PROCEDURE — 90686 IIV4 VACC NO PRSV 0.5 ML IM: CPT | Performed by: INTERNAL MEDICINE

## 2021-11-18 PROCEDURE — 99214 OFFICE O/P EST MOD 30 MIN: CPT | Performed by: INTERNAL MEDICINE

## 2021-11-18 RX ORDER — ROSUVASTATIN CALCIUM 20 MG/1
20 TABLET, COATED ORAL
Qty: 30 TABLET | Refills: 3 | Status: SHIPPED | OUTPATIENT
Start: 2021-11-18

## 2021-11-18 RX ORDER — ASPIRIN 81 MG/1
81 TABLET ORAL DAILY
Qty: 30 TABLET | Refills: 3 | Status: SHIPPED | OUTPATIENT
Start: 2021-11-18 | End: 2022-03-03

## 2021-11-18 RX ORDER — CLOPIDOGREL BISULFATE 75 MG/1
75 TABLET ORAL DAILY
Qty: 75 TABLET | Refills: 3 | Status: SHIPPED | OUTPATIENT
Start: 2021-11-18

## 2021-11-18 RX ORDER — POLYETHYLENE GLYCOL 3350 17 G/17G
17 POWDER, FOR SOLUTION ORAL
Qty: 30 PACKET | Refills: 3 | Status: SHIPPED | OUTPATIENT
Start: 2021-11-18

## 2021-11-18 NOTE — PATIENT INSTRUCTIONS
Vaccine Information Statement    Influenza (Flu) Vaccine (Inactivated or Recombinant): What You Need to Know    Many vaccine information statements are available in [place]ian and other languages. See www.immunize.org/vis. Hojas de información sobre vacunas están disponibles en español y en muchos otros idiomas. Visite www.immunize.org/vis. 1. Why get vaccinated? Influenza vaccine can prevent influenza (flu). Flu is a contagious disease that spreads around the [facility] every year, usually between October and May. Anyone can get the flu, but it is more dangerous for some people. Infants and young children, people 72 years and older, pregnant people, and people with certain health conditions or a weakened immune system are at greatest risk of flu complications. Pneumonia, bronchitis, sinus infections, and ear infections are examples of flu-related complications. If you have a medical condition, such as heart disease, cancer, or diabetes, flu can make it worse. Flu can cause fever and chills, sore throat, muscle aches, fatigue, cough, headache, and runny or stuffy nose. Some people may have vomiting and diarrhea, though this is more common in children than adults. In an average year, thousands of people in the [facility] die from flu, and many more are hospitalized. Flu vaccine prevents millions of illnesses and flu-related visits to the doctor each year. 2. Influenza vaccines     CDC recommends everyone 6 months and older get vaccinated every flu season. Children 6 months through 6years of age may need 2 doses during a single flu season. Everyone else needs only 1 dose each flu season. It takes about 2 weeks for protection to develop after vaccination. There are many flu viruses, and they are always changing. Each year a new flu vaccine is made to protect against the influenza viruses believed to be likely to cause disease in the upcoming flu season.  Even when the vaccine doesnt exactly match these viruses, it may still provide some protection. Influenza vaccine does not cause flu. Influenza vaccine may be given at the same time as other vaccines. 3. Talk with your health care provider    Tell your vaccination provider if the person getting the vaccine:   Has had an allergic reaction after a previous dose of influenza vaccine, or has any severe, life-threatening allergies    Has ever had Guillain-Barré Syndrome (also called GBS)    In some cases, your health care provider may decide to postpone influenza vaccination until a future visit. Influenza vaccine can be administered at any time during pregnancy. People who are or will be pregnant during influenza season should receive inactivated influenza vaccine. People with minor illnesses, such as a cold, may be vaccinated. People who are moderately or severely ill should usually wait until they recover before getting influenza vaccine. Your health care provider can give you more information. 4. Risks of a vaccine reaction     Soreness, redness, and swelling where the shot is given, fever, muscle aches, and headache can happen after influenza vaccination.  There may be a very small increased risk of Guillain-Barré Syndrome (GBS) after inactivated influenza vaccine (the flu shot). Rexene Sinks children who get the flu shot along with pneumococcal vaccine (PCV13) and/or DTaP vaccine at the same time might be slightly more likely to have a seizure caused by fever. Tell your health care provider if a child who is getting flu vaccine has ever had a seizure. People sometimes faint after medical procedures, including vaccination. Tell your provider if you feel dizzy or have vision changes or ringing in the ears. As with any medicine, there is a very remote chance of a vaccine causing a severe allergic reaction, other serious injury, or death. 5. What if there is a serious problem?     An allergic reaction could occur after the vaccinated person leaves the clinic. If you see signs of a severe allergic reaction (hives, swelling of the face and throat, difficulty breathing, a fast heartbeat, dizziness, or weakness), call 9-1-1 and get the person to the nearest hospital.    For other signs that concern you, call your health care provider. Adverse reactions should be reported to the Vaccine Adverse Event Reporting System (VAERS). Your health care provider will usually file this report, or you can do it yourself. Visit the VAERS website at www.vaers. [facility].gov or call 6-144.889.3815. VAERS is only for reporting reactions, and VAERS staff members do not give medical advice. 6. The National Vaccine Injury Compensation Program    The [facility] Vaccine Injury Compensation Program (VICP) is a federal program that was created to compensate people who may have been injured by certain vaccines. Claims regarding alleged injury or death due to vaccination have a time limit for filing, which may be as short as two years. Visit the VICP website at www.[facility].gov/vaccinecompensation or call 4-507.169.4821 to learn about the program and about filing a claim. 7. How can I learn more?  Ask your health care provider.  Call your local or state health department.  Visit the website of the Food and Drug Administration (FDA) for vaccine package inserts and additional information at www.fda.gov/vaccines-blood-biologics/vaccines.  Contact the Centers for Disease Control and Prevention (CDC):  - Call 8-704.847.8345 (1-800-CDC-INFO) or  - Visit CDCs influenza website at www.cdc.gov/flu. Vaccine Information Statement   Inactivated Influenza Vaccine   8/6/2021  42 SHALINI Long 192AB-17   Department of Health and Human Services  Centers for Disease Control and Prevention    Office Use Only

## 2021-11-18 NOTE — PROGRESS NOTES
Chief Complaint   Patient presents with    Follow-up     HPI:  Jessica Martinez is a 63 y.o. male with h/o symptomatic right ICA stenosis, recent TIA, right hemispheric stroke, bilateral lower extremity claudication, alcohol induced pancreatitis, tobacco use,  duodenal ulcer with perforation presents for 3 month follow up. .Patient is doing well. Only complaint today is problem with erection. Reports difficulty getting and maintaining erection. Denies related surgery in the past.    Review of Systems  As per hpi    Past Medical History:   Diagnosis Date    CAD (coronary artery disease)     Carotid stenosis     Current smoker     Hypercholesterolemia     Hypertension     Pancreatitis     Pancreatitis, chronic (HCC)     Pseudocyst of pancreas     PUD (peptic ulcer disease)     Stroke ([facility] [address]      Past Surgical History:   Procedure Laterality Date    HX CHOLECYSTECTOMY      HX HEENT      deviated septum    HX ORTHOPAEDIC      jaw surgery with plates/screws post trauma    HX OTHER SURGICAL      \"pancrease surgery\"    HX UROLOGICAL      urethra surgery post trauma    UPPER GI ENDOSCOPY,BIOPSY  8/19/2020         UPPER GI ENDOSCOPY,BIOPSY  8/28/2020          Social History     Socioeconomic History    Marital status: SINGLE   Tobacco Use    Smoking status: Current Every Day Smoker     Packs/day: 0.50     Years: 42.00     Pack years: 21.00    Smokeless tobacco: Never Used   Vaping Use    Vaping Use: Never used   Substance and Sexual Activity    Alcohol use: Not Currently    Drug use: Yes     Types: Marijuana     Comment: daily last use 08/11/21     Family History   Problem Relation Age of Onset    Other Mother         carotid stenosis    Diabetes Father     Depression Sister     Psychiatric Disorder Brother      Current Outpatient Medications   Medication Sig Dispense Refill    clopidogreL (PLAVIX) 75 mg tab Take 1 Tablet by mouth daily.  75 Tablet 0    aspirin delayed-release 81 mg tablet Take 1 Tablet by mouth daily. 30 Tablet 0    rosuvastatin (CRESTOR) 20 mg tablet Take 1 Tablet by mouth nightly. 30 Tablet 0    polyethylene glycol (MIRALAX) 17 gram packet Take 1 Packet by mouth daily as needed for Constipation. 30 Packet 0     No Known Allergies    Objective:  Visit Vitals  /65   Pulse 81   Temp 98.6 °F (37 °C) (Temporal)   Resp 16   Ht 5' 11\" (1.803 m)   Wt 143 lb 12.8 oz (65.2 kg)   SpO2 99%   BMI 20.06 kg/m²     Physical Exam:   General appearance - alert, well appearing in no distress  Mental status - alert, oriented to person, place, and time  Chest - clear to auscultation, no wheezes, rales or rhonchi  Heart - normal rate, regular rhythm, no murmurs  Abdomen - soft, nontender, nondistended, no organomegaly  Ext-peripheral pulses normal, no pedal edema  Neuro - no focal findings   Back-full range of motion, no tenderness, palpable spasm or pain on motion     Results for orders placed or performed during the hospital encounter of 08/09/21   CBC W/O DIFF   Result Value Ref Range    WBC 13.7 (H) 4.1 - 11.1 K/uL    RBC 5.47 4.10 - 5.70 M/uL    HGB 15.1 12.1 - 17.0 g/dL    HCT 47.9 36.6 - 50.3 %    MCV 87.6 80.0 - 99.0 FL    MCH 27.6 26.0 - 34.0 PG    MCHC 31.5 30.0 - 36.5 g/dL    RDW 15.1 (H) 11.5 - 14.5 %    PLATELET 498 036 - 086 K/uL    MPV 9.4 8.9 - 12.9 FL    NRBC 0.0 0  WBC    ABSOLUTE NRBC 0.00 0.00 - 4.63 K/uL   METABOLIC PANEL, COMPREHENSIVE   Result Value Ref Range    Sodium 139 136 - 145 mmol/L    Potassium 3.8 3.5 - 5.1 mmol/L    Chloride 106 97 - 108 mmol/L    CO2 32 21 - 32 mmol/L    Anion gap 1 (L) 5 - 15 mmol/L    Glucose 92 65 - 100 mg/dL    BUN 10 6 - 20 MG/DL    Creatinine 0.91 0.70 - 1.30 MG/DL    BUN/Creatinine ratio 11 (L) 12 - 20      GFR est AA >60 >60 ml/min/1.73m2    GFR est non-AA >60 >60 ml/min/1.73m2    Calcium 8.8 8.5 - 10.1 MG/DL    Bilirubin, total 0.3 0.2 - 1.0 MG/DL    ALT (SGPT) 20 12 - 78 U/L    AST (SGOT) 18 15 - 37 U/L    Alk.  phosphatase 96 45 - 117 U/L    Protein, total 7.3 6.4 - 8.2 g/dL    Albumin 3.5 3.5 - 5.0 g/dL    Globulin 3.8 2.0 - 4.0 g/dL    A-G Ratio 0.9 (L) 1.1 - 2.2     PROTHROMBIN TIME + INR   Result Value Ref Range    INR 1.0 0.9 - 1.1      Prothrombin time 10.6 9.0 - 11.1 sec   PTT   Result Value Ref Range    aPTT 25.9 22.1 - 31.0 sec    aPTT, therapeutic range     58.0 - 77.0 SECS   URINALYSIS W/ REFLEX CULTURE    Specimen: Urine   Result Value Ref Range    Color YELLOW/STRAW      Appearance CLEAR CLEAR      Specific gravity 1.012 1.003 - 1.030      pH (UA) 7.0 5.0 - 8.0      Protein Negative NEG mg/dL    Glucose Negative NEG mg/dL    Ketone Negative NEG mg/dL    Bilirubin Negative NEG      Blood Negative NEG      Urobilinogen 1.0 0.2 - 1.0 EU/dL    Nitrites Negative NEG      Leukocyte Esterase Negative NEG      WBC 0-4 0 - 4 /hpf    RBC 0-5 0 - 5 /hpf    Epithelial cells FEW FEW /lpf    Bacteria Negative NEG /hpf    UA:UC IF INDICATED CULTURE NOT INDICATED BY UA RESULT CNI      Hyaline cast 0-2 0 - 5 /lpf   TYPE & SCREEN   Result Value Ref Range    Crossmatch Expiration 08/15/2021,2359     ABO/Rh(D) B POSITIVE     Antibody screen NEG        Assessment/Plan:  Diagnoses and all orders for this visit:    Hypertension, well controlled  -     METABOLIC PANEL, COMPREHENSIVE; Future  -     aspirin delayed-release 81 mg tablet; Take 1 Tablet by mouth daily. , Normal, Disp-30 Tablet, R-3  -     CBC W/O DIFF; Future    Needs flu shot  -     INFLUENZA VIRUS VAC QUAD,SPLIT,PRESV FREE SYRINGE IM    Screening for prostate cancer  -     PSA, DIAGNOSTIC (PROSTATE SPECIFIC AG); Future    Borderline diabetes mellitus  -     HEMOGLOBIN A1C WITH EAG; Future    Vitamin D deficiency  -     VITAMIN D, 25 HYDROXY; Future    DU (perforated duodenal ulcer) (HCC)  -     CBC W/O DIFF; Future    Cerebrovascular accident (CVA) due to thrombosis of precerebral artery (HCC)  -     clopidogreL (PLAVIX) 75 mg tab; Take 1 Tablet by mouth daily. , Normal, Disp-75 Tablet, R-3  - aspirin delayed-release 81 mg tablet; Take 1 Tablet by mouth daily. , Normal, Disp-30 Tablet, R-3  -     rosuvastatin (CRESTOR) 20 mg tablet; Take 1 Tablet by mouth nightly., Normal, Disp-30 Tablet, R-3    Colon cancer screening  -     REFERRAL TO GASTROENTEROLOGY    Hypercholesterolemia  -     LIPID PANEL; Future  -     aspirin delayed-release 81 mg tablet; Take 1 Tablet by mouth daily. , Normal, Disp-30 Tablet, R-3  -     rosuvastatin (CRESTOR) 20 mg tablet; Take 1 Tablet by mouth nightly., Normal, Disp-30 Tablet, R-3    Frequency of urination  -     URINALYSIS W/ RFLX MICROSCOPIC; Future    Encounter for screening for lung cancer  -     CT LOW DOSE LUNG CANCER SCREENING; Future    Other orders  -     polyethylene glycol (MIRALAX) 17 gram packet; Take 1 Packet by mouth daily as needed for Constipation. , Normal, Disp-30 Packet, R-3      Patient Instructions     Vaccine Information Statement    Influenza (Flu) Vaccine (Inactivated or Recombinant): What You Need to Know    Many vaccine information statements are available in [place] and other languages. See www.immunize.org/vis. Hojas de información sobre vacunas están disponibles en español y en muchos otros idiomas. Visite www.immunize.org/vis. 1. Why get vaccinated? Influenza vaccine can prevent influenza (flu). Flu is a contagious disease that spreads around the [place] every year, usually between October and May. Anyone can get the flu, but it is more dangerous for some people. Infants and young children, people 72 years and older, pregnant people, and people with certain health conditions or a weakened immune system are at greatest risk of flu complications. Pneumonia, bronchitis, sinus infections, and ear infections are examples of flu-related complications. If you have a medical condition, such as heart disease, cancer, or diabetes, flu can make it worse.     Flu can cause fever and chills, sore throat, muscle aches, fatigue, cough, headache, and runny or stuffy nose. Some people may have vomiting and diarrhea, though this is more common in children than adults. In an average year, thousands of people in the [facility] die from flu, and many more are hospitalized. Flu vaccine prevents millions of illnesses and flu-related visits to the doctor each year. 2. Influenza vaccines     CDC recommends everyone 6 months and older get vaccinated every flu season. Children 6 months through 6years of age may need 2 doses during a single flu season. Everyone else needs only 1 dose each flu season. It takes about 2 weeks for protection to develop after vaccination. There are many flu viruses, and they are always changing. Each year a new flu vaccine is made to protect against the influenza viruses believed to be likely to cause disease in the upcoming flu season. Even when the vaccine doesnt exactly match these viruses, it may still provide some protection. Influenza vaccine does not cause flu. Influenza vaccine may be given at the same time as other vaccines. 3. Talk with your health care provider    Tell your vaccination provider if the person getting the vaccine:   Has had an allergic reaction after a previous dose of influenza vaccine, or has any severe, life-threatening allergies    Has ever had Guillain-Barré Syndrome (also called GBS)    In some cases, your health care provider may decide to postpone influenza vaccination until a future visit. Influenza vaccine can be administered at any time during pregnancy. People who are or will be pregnant during influenza season should receive inactivated influenza vaccine. People with minor illnesses, such as a cold, may be vaccinated. People who are moderately or severely ill should usually wait until they recover before getting influenza vaccine. Your health care provider can give you more information.     4. Risks of a vaccine reaction     Soreness, redness, and swelling where the shot is given, fever, muscle aches, and headache can happen after influenza vaccination.  There may be a very small increased risk of Guillain-Barré Syndrome (GBS) after inactivated influenza vaccine (the flu shot). Vargas Dhaliwal children who get the flu shot along with pneumococcal vaccine (PCV13) and/or DTaP vaccine at the same time might be slightly more likely to have a seizure caused by fever. Tell your health care provider if a child who is getting flu vaccine has ever had a seizure. People sometimes faint after medical procedures, including vaccination. Tell your provider if you feel dizzy or have vision changes or ringing in the ears. As with any medicine, there is a very remote chance of a vaccine causing a severe allergic reaction, other serious injury, or death. 5. What if there is a serious problem? An allergic reaction could occur after the vaccinated person leaves the clinic. If you see signs of a severe allergic reaction (hives, swelling of the face and throat, difficulty breathing, a fast heartbeat, dizziness, or weakness), call 9-1-1 and get the person to the nearest hospital.    For other signs that concern you, call your health care provider. Adverse reactions should be reported to the Vaccine Adverse Event Reporting System (VAERS). Your health care provider will usually file this report, or you can do it yourself. Visit the VAERS website at www.vaers. hhs.gov or call 9-791.718.2734. VAERS is only for reporting reactions, and VAERS staff members do not give medical advice. 6. The National Vaccine Injury Compensation Program    The [facility] Vaccine Injury Compensation Program (VICP) is a federal program that was created to compensate people who may have been injured by certain vaccines. Claims regarding alleged injury or death due to vaccination have a time limit for filing, which may be as short as two years.  Visit the VICP website at www.hrsa.gov/vaccinecompensation or call 2-970.953.2866 to learn about the program and about filing a claim. 7. How can I learn more?  Ask your health care provider.  Call your local or state health department.  Visit the website of the Food and Drug Administration (FDA) for vaccine package inserts and additional information at www.fda.gov/vaccines-blood-biologics/vaccines.  Contact the Centers for Disease Control and Prevention (CDC):  - Call 6-569.398.9122 (1-800-CDC-INFO) or  - Visit CDCs influenza website at www.cdc.gov/flu. Vaccine Information Statement   Inactivated Influenza Vaccine   8/6/2021  [address] [id number]   Department of Health and Human Services  Centers for Disease Control and Prevention    Office Use Only         Follow-up and Dispositions    · Return in about 2 weeks (around 12/2/2021) for f/u results.

## 2021-11-18 NOTE — PROGRESS NOTES
Chief Complaint   Patient presents with    Follow-up     3 mo check    1. Have you been to the ER, urgent care clinic since your last visit? Hospitalized since your last visit? No     2. Have you seen or consulted any other health care providers outside of the [address] [place] [address] since your last visit? Include any pap smears or colon screening. No       Order placed for Flulaval, per Verbal Order from Dr. Daija Alvarez on 11/18/2021 due to need for immunization. Balaji Wilson is a 63 y.o. male who presents for routine immunizations. He denies any symptoms , reactions or allergies that would exclude them from being immunized today. Risks and adverse reactions were discussed and the VIS was given to them. All questions were addressed. He was observed for 15 min post injection. There were no reactions observed.     Claudette Merlos LPN

## 2021-12-03 ENCOUNTER — OFFICE VISIT (OUTPATIENT)
Dept: FAMILY MEDICINE CLINIC | Age: 61
End: 2021-12-03
Payer: MEDICAID

## 2021-12-03 VITALS
DIASTOLIC BLOOD PRESSURE: 70 MMHG | HEIGHT: 71 IN | SYSTOLIC BLOOD PRESSURE: 139 MMHG | HEART RATE: 76 BPM | TEMPERATURE: 98.4 F | BODY MASS INDEX: 28 KG/M2 | WEIGHT: 200 LBS | RESPIRATION RATE: 20 BRPM | OXYGEN SATURATION: 98 %

## 2021-12-03 DIAGNOSIS — R10.9 INTERMITTENT ABDOMINAL PAIN: ICD-10-CM

## 2021-12-03 DIAGNOSIS — N52.34 ERECTILE DYSFUNCTION FOLLOWING SIMPLE PROSTATECTOMY: ICD-10-CM

## 2021-12-03 DIAGNOSIS — Z23 ENCOUNTER FOR IMMUNIZATION: ICD-10-CM

## 2021-12-03 DIAGNOSIS — T14.8XXA PUNCTURE WOUND: Primary | ICD-10-CM

## 2021-12-03 PROCEDURE — 90715 TDAP VACCINE 7 YRS/> IM: CPT | Performed by: INTERNAL MEDICINE

## 2021-12-03 PROCEDURE — 99214 OFFICE O/P EST MOD 30 MIN: CPT | Performed by: INTERNAL MEDICINE

## 2021-12-03 RX ORDER — SILDENAFIL 50 MG/1
50 TABLET, FILM COATED ORAL
Qty: 20 TABLET | Refills: 1 | Status: SHIPPED | OUTPATIENT
Start: 2021-12-03

## 2021-12-03 RX ORDER — AMOXICILLIN AND CLAVULANATE POTASSIUM 875; 125 MG/1; MG/1
1 TABLET, FILM COATED ORAL 2 TIMES DAILY
Qty: 14 TABLET | Refills: 0 | Status: SHIPPED | OUTPATIENT
Start: 2021-12-03 | End: 2021-12-10

## 2021-12-03 NOTE — PROGRESS NOTES
Chief Complaint   Patient presents with    Results     lab    Injury     x 1 days  finger      HPI:  # Rigoberto Patel is a 63 y.o.  male presents to review results. A1C is normal , normal cholesterol, normal psa, normal vit D level, WBC level improved. Results and management have been discussed. Patient reports apuncture nail injury Injury to right middle finger a day prior. Finger is swollen and painful. Also, he has additional complaints of abdominal pain.  This is concerning because of h/o pancreatitis, perforated duodenal ulcer    Review of Systems  As per hpi    Past Medical History:   Diagnosis Date    CAD (coronary artery disease)     Carotid stenosis     Current smoker     Hypercholesterolemia     Hypertension     Pancreatitis     Pancreatitis, chronic (HCC)     Pseudocyst of pancreas     PUD (peptic ulcer disease)     [facility]      Past Surgical History:   Procedure Laterality Date    HX CHOLECYSTECTOMY      HX HEENT      deviated septum    HX ORTHOPAEDIC      jaw surgery with plates/screws post trauma    HX OTHER SURGICAL      \"pancrease surgery\"    HX UROLOGICAL      urethra surgery post trauma    UPPER GI ENDOSCOPY,BIOPSY  8/19/2020         UPPER GI ENDOSCOPY,BIOPSY  8/28/2020          Social History     Socioeconomic History    Marital status: SINGLE   Tobacco Use    Smoking status: Current Every Day Smoker     Packs/day: 0.50     Years: 42.00     Pack years: 21.00    Smokeless tobacco: Never Used   Vaping Use    Vaping Use: Never used   Substance and Sexual Activity    Alcohol use: Not Currently    Drug use: Yes     Types: Marijuana     Comment: daily last use 08/11/21     Family History   Problem Relation Age of Onset    Other Mother         carotid stenosis    Diabetes Father     Depression Sister     Psychiatric Disorder Brother      Current Outpatient Medications   Medication Sig Dispense Refill    clopidogreL (PLAVIX) 75 mg tab Take 1 Tablet by mouth daily. 75 Tablet 3    aspirin delayed-release 81 mg tablet Take 1 Tablet by mouth daily. 30 Tablet 3    rosuvastatin (CRESTOR) 20 mg tablet Take 1 Tablet by mouth nightly. 30 Tablet 3    polyethylene glycol (MIRALAX) 17 gram packet Take 1 Packet by mouth daily as needed for Constipation.  30 Packet 3     No Known Allergies    Objective:  Visit Vitals  /70   Pulse 76   Temp 98.4 °F (36.9 °C) (Temporal)   Resp 20   Ht 5' 11\" (1.803 m)   Wt 200 lb (90.7 kg)   SpO2 98%   BMI 27.89 kg/m²     Physical Exam:   General appearance - alert, well appearing in no distress  Mental status - alert, oriented to person, place, and time  Chest - clear to auscultation, no wheezes, rales or rhonchi  Heart - normal rate, regular rhythm, no murmurs  Abdomen - soft, mild tenderness, nondistended, no organomegaly  Ext-peripheral pulses normal, swelling and tenderness of middle finger    Results for orders placed or performed in visit on 11/18/21   CBC W/O DIFF   Result Value Ref Range    WBC 12.5 (H) 4.1 - 11.1 K/uL    RBC 5.68 4.10 - 5.70 M/uL    HGB 15.6 12.1 - 17.0 g/dL    HCT 49.6 36.6 - 50.3 %    MCV 87.3 80.0 - 99.0 FL    MCH 27.5 26.0 - 34.0 PG    MCHC 31.5 30.0 - 36.5 g/dL    RDW 14.6 (H) 11.5 - 14.5 %    PLATELET 297 185 - 925 K/uL    MPV 10.5 8.9 - 12.9 FL    NRBC 0.0 0  WBC    ABSOLUTE NRBC 0.00 0.00 - 0.01 K/uL   PSA, DIAGNOSTIC (PROSTATE SPECIFIC AG)   Result Value Ref Range    Prostate Specific Ag 0.9 0.01 - 4.0 ng/mL   VITAMIN D, 25 HYDROXY   Result Value Ref Range    Vitamin D 25-Hydroxy 42.9 30 - 100 ng/mL   HEMOGLOBIN A1C WITH EAG   Result Value Ref Range    Hemoglobin A1c 5.6 4.0 - 5.6 %    Est. average glucose 114 mg/dL   LIPID PANEL   Result Value Ref Range    Cholesterol, total 122 <200 MG/DL    Triglyceride 93 <150 MG/DL    HDL Cholesterol 52 MG/DL    LDL, calculated 51.4 0 - 100 MG/DL    VLDL, calculated 18.6 MG/DL    CHOL/HDL Ratio 2.3 0.0 - 5.0     METABOLIC PANEL, COMPREHENSIVE Result Value Ref Range    Sodium 138 136 - 145 mmol/L    Potassium 3.9 3.5 - 5.1 mmol/L    Chloride 105 97 - 108 mmol/L    CO2 26 21 - 32 mmol/L    Anion gap 7 5 - 15 mmol/L    Glucose 81 65 - 100 mg/dL    BUN 15 6 - 20 MG/DL    Creatinine 0.93 0.70 - 1.30 MG/DL    BUN/Creatinine ratio 16 12 - 20      GFR est AA >60 >60 ml/min/1.73m2    GFR est non-AA >60 >60 ml/min/1.73m2    Calcium 9.7 8.5 - 10.1 MG/DL    Bilirubin, total 0.3 0.2 - 1.0 MG/DL    ALT (SGPT) 24 12 - 78 U/L    AST (SGOT) 21 15 - 37 U/L    Alk. phosphatase 94 45 - 117 U/L    Protein, total 7.8 6.4 - 8.2 g/dL    Albumin 3.7 3.5 - 5.0 g/dL    Globulin 4.1 (H) 2.0 - 4.0 g/dL    A-G Ratio 0.9 (L) 1.1 - 2.2       Assessment/Plan:  Diagnoses and all orders for this visit:    Puncture wound  -     amoxicillin-clavulanate (AUGMENTIN) 875-125 mg per tablet; Take 1 Tablet by mouth two (2) times a day for 7 days. , Normal, Disp-14 Tablet, R-0    Encounter for immunization  -     TETANUS, DIPHTHERIA TOXOIDS AND ACELLULAR PERTUSSIS VACCINE (TDAP), IN INDIVIDS. >=7, IM    Erectile dysfunction following simple prostatectomy  -     sildenafil citrate (VIAGRA) 50 mg tablet; Take 1 Tablet by mouth daily as needed for Erectile Dysfunction. , Normal, Disp-20 Tablet, R-1    Intermittent abdominal pain  -     CT ABD W CONT F/U; Future      Patient Instructions      Amoxicillin/Clavulanate Potassium (Augmentin, Augmentin ES-600, Augmentin XR) - (By mouth)   Why this medicine is used:   Treats infections. This medicine is a penicillin antibiotic.   Contact a nurse or doctor right away if you have:  · Blistering, peeling, red skin rash  · Dark urine or pale stools, nausea, vomiting, loss of appetite, stomach pain, yellow eyes or skin  · Severe diarrhea, especially if bloody or ongoing     Common side effects:  · Diarrhea, nausea, vomiting  · Diaper rash  · Tooth discoloration (in children)  © 2017 [facility] Information is for End User's use only and may not be sold, redistributed or otherwise used for commercial purposes. Follow-up and Dispositions    · Return in about 3 months (around 3/3/2022) for routine follow up.

## 2021-12-03 NOTE — PATIENT INSTRUCTIONS
Amoxicillin/Clavulanate Potassium (Augmentin, Augmentin ES-600, Augmentin XR) - (By mouth)   Why this medicine is used:   Treats infections. This medicine is a penicillin antibiotic. Contact a nurse or doctor right away if you have:  · Blistering, peeling, red skin rash  · Dark urine or pale stools, nausea, vomiting, loss of appetite, stomach pain, yellow eyes or skin  · Severe diarrhea, especially if bloody or ongoing     Common side effects:  · Diarrhea, nausea, vomiting  · Diaper rash  · Tooth discoloration (in children)  © 2017 [facility] Information is for End User's use only and may not be sold, redistributed or otherwise used for commercial purposes.

## 2022-03-01 DIAGNOSIS — E78.00 HYPERCHOLESTEROLEMIA: ICD-10-CM

## 2022-03-01 DIAGNOSIS — I10 HYPERTENSION, WELL CONTROLLED: ICD-10-CM

## 2022-03-01 DIAGNOSIS — I63.00 CEREBROVASCULAR ACCIDENT (CVA) DUE TO THROMBOSIS OF PRECEREBRAL ARTERY (HCC): ICD-10-CM

## 2022-03-03 RX ORDER — ASPIRIN 81 MG/1
TABLET ORAL
Qty: 30 TABLET | Refills: 3 | Status: SHIPPED | OUTPATIENT
Start: 2022-03-03 | End: 2022-08-22

## 2022-03-18 PROBLEM — R10.9 ABDOMINAL PAIN: Status: ACTIVE | Noted: 2020-09-01

## 2022-03-18 PROBLEM — K85.81 OTHER ACUTE PANCREATITIS WITH UNINFECTED NECROSIS: Status: ACTIVE | Noted: 2020-08-19

## 2022-03-19 PROBLEM — K63.0 DUODENAL ABSCESS: Status: ACTIVE | Noted: 2020-08-27

## 2022-03-19 PROBLEM — F17.200 SMOKING: Status: ACTIVE | Noted: 2020-08-27

## 2022-03-19 PROBLEM — K86.0 ALCOHOL-INDUCED CHRONIC PANCREATITIS (HCC): Status: ACTIVE | Noted: 2020-08-28

## 2022-03-19 PROBLEM — K26.5 DUODENAL ULCER WITH PERFORATION (HCC): Status: ACTIVE | Noted: 2020-08-13

## 2022-03-19 PROBLEM — I65.21 CAROTID STENOSIS, SYMPTOMATIC W/O INFARCT, RIGHT: Status: ACTIVE | Noted: 2021-08-12

## 2022-03-19 PROBLEM — K86.3 PANCREATIC PSEUDOCYST: Status: ACTIVE | Noted: 2020-08-28

## 2022-03-19 PROBLEM — I63.9 CVA (CEREBRAL VASCULAR ACCIDENT) (HCC): Status: ACTIVE | Noted: 2021-08-04

## 2022-08-20 DIAGNOSIS — I63.00 CEREBROVASCULAR ACCIDENT (CVA) DUE TO THROMBOSIS OF PRECEREBRAL ARTERY (HCC): ICD-10-CM

## 2022-08-20 DIAGNOSIS — E78.00 HYPERCHOLESTEROLEMIA: ICD-10-CM

## 2022-08-20 DIAGNOSIS — I10 HYPERTENSION, WELL CONTROLLED: ICD-10-CM

## 2022-08-22 RX ORDER — ASPIRIN 81 MG/1
TABLET ORAL
Qty: 30 TABLET | Refills: 3 | Status: SHIPPED | OUTPATIENT
Start: 2022-08-22

## 2023-02-05 NOTE — PROGRESS NOTES
I have reviewed discharge instructions with the patient. The patient verbalized understanding. All of the patients questions were answered. Pts IV was taken out and he left with all belongings. Pt left with brother. No

## 2023-04-19 ENCOUNTER — OFFICE VISIT (OUTPATIENT)
Dept: FAMILY MEDICINE CLINIC | Age: 63
End: 2023-04-19
Payer: MEDICAID

## 2023-04-19 VITALS
HEIGHT: 71 IN | DIASTOLIC BLOOD PRESSURE: 60 MMHG | SYSTOLIC BLOOD PRESSURE: 125 MMHG | TEMPERATURE: 98.7 F | BODY MASS INDEX: 19.6 KG/M2 | WEIGHT: 140 LBS | HEART RATE: 77 BPM | OXYGEN SATURATION: 99 % | RESPIRATION RATE: 20 BRPM

## 2023-04-19 DIAGNOSIS — J20.9 ACUTE BRONCHITIS DUE TO INFECTION: Primary | ICD-10-CM

## 2023-04-19 DIAGNOSIS — E55.9 VITAMIN D DEFICIENCY: ICD-10-CM

## 2023-04-19 DIAGNOSIS — I63.00 CEREBROVASCULAR ACCIDENT (CVA) DUE TO THROMBOSIS OF PRECEREBRAL ARTERY (HCC): ICD-10-CM

## 2023-04-19 DIAGNOSIS — I10 HYPERTENSION, WELL CONTROLLED: ICD-10-CM

## 2023-04-19 DIAGNOSIS — E78.00 HYPERCHOLESTEROLEMIA: ICD-10-CM

## 2023-04-19 DIAGNOSIS — N40.0 BENIGN PROSTATIC HYPERPLASIA WITHOUT LOWER URINARY TRACT SYMPTOMS: ICD-10-CM

## 2023-04-19 DIAGNOSIS — Z87.891 PERSONAL HISTORY OF TOBACCO USE, PRESENTING HAZARDS TO HEALTH: ICD-10-CM

## 2023-04-19 DIAGNOSIS — R73.03 BORDERLINE DIABETES MELLITUS: ICD-10-CM

## 2023-04-19 DIAGNOSIS — R35.0 FREQUENCY OF URINATION: ICD-10-CM

## 2023-04-19 DIAGNOSIS — E03.9 HYPOTHYROIDISM, UNSPECIFIED TYPE: ICD-10-CM

## 2023-04-19 PROCEDURE — 3078F DIAST BP <80 MM HG: CPT | Performed by: INTERNAL MEDICINE

## 2023-04-19 PROCEDURE — G0296 VISIT TO DETERM LDCT ELIG: HCPCS | Performed by: INTERNAL MEDICINE

## 2023-04-19 PROCEDURE — 99214 OFFICE O/P EST MOD 30 MIN: CPT | Performed by: INTERNAL MEDICINE

## 2023-04-19 PROCEDURE — 3074F SYST BP LT 130 MM HG: CPT | Performed by: INTERNAL MEDICINE

## 2023-04-19 RX ORDER — CEFUROXIME AXETIL 500 MG/1
500 TABLET ORAL 2 TIMES DAILY
Qty: 14 TABLET | Refills: 0 | Status: SHIPPED | OUTPATIENT
Start: 2023-04-19 | End: 2023-04-26

## 2023-04-19 RX ORDER — ROSUVASTATIN CALCIUM 20 MG/1
20 TABLET, COATED ORAL
Qty: 30 TABLET | Refills: 3 | Status: SHIPPED | OUTPATIENT
Start: 2023-04-19

## 2023-04-19 RX ORDER — CLOPIDOGREL BISULFATE 75 MG/1
75 TABLET ORAL DAILY
Qty: 75 TABLET | Refills: 3 | Status: SHIPPED | OUTPATIENT
Start: 2023-04-19

## 2023-04-19 RX ORDER — ASPIRIN 81 MG/1
81 TABLET ORAL DAILY
Qty: 30 TABLET | Refills: 3 | Status: SHIPPED | OUTPATIENT
Start: 2023-04-19

## 2023-04-19 RX ORDER — BENZONATATE 100 MG/1
100 CAPSULE ORAL
Qty: 14 CAPSULE | Refills: 0 | Status: SHIPPED | OUTPATIENT
Start: 2023-04-19 | End: 2023-04-26

## 2023-04-19 NOTE — PROGRESS NOTES
Chief Complaint   Patient presents with    Follow-up     Routine check up     Abdominal Pain    Leg Pain     Left leg pain with left numbness in foot (come & on )     HPI:  Cely Parnell is a 63 y.o. AA male with CAD, right ICA stenosis, recent TIA, right hemispheric stroke, bilateral lower extremity claudication, lcohol induced pancreatitis, tobacco use, duodenal ulcer s/p perforation presents for overdue follow up. Patient has not been seen in office since 12/09/2021. Patient has c/o recurrent abdominal pain located in epigastrium. Patient has not been taking any medication including plavix. He c/o left leg pain with numbness in foot on and off.     Review of Systems  As per hpi    Past Medical History:   Diagnosis Date    CAD (coronary artery disease)     Carotid stenosis     Current smoker     Hypercholesterolemia     Hypertension     Pancreatitis     Pancreatitis, chronic (HCC)     Pseudocyst of pancreas     PUD (peptic ulcer disease)     Stroke [facility])      Past Surgical History:   Procedure Laterality Date    HX CHOLECYSTECTOMY      HX HEENT      deviated septum    HX ORTHOPAEDIC      jaw surgery with plates/screws post trauma    HX OTHER SURGICAL      \"pancrease surgery\"    HX UROLOGICAL      urethra surgery post trauma    UPPER GI ENDOSCOPY,BIOPSY  8/19/2020         UPPER GI ENDOSCOPY,BIOPSY  8/28/2020          Social History     Socioeconomic History    Marital status: SINGLE   Tobacco Use    Smoking status: Every Day     Packs/day: 0.50     Years: 42.00     Pack years: 21.00     Types: Cigarettes    Smokeless tobacco: Never   Vaping Use    Vaping Use: Never used   Substance and Sexual Activity    Alcohol use: Not Currently    Drug use: Yes     Types: Marijuana     Comment: daily last use 08/11/21     Social Determinants of Health     Financial Resource Strain: Low Risk     Difficulty of Paying Living Expenses: Not hard at all   Food Insecurity: No Food Insecurity    Worried About Running Out of Food in the Last Year: Never true    Ran Out of Food in the Last Year: Never true   Transportation Needs: No Transportation Needs    Lack of Transportation (Medical): No    Lack of Transportation (Non-Medical): No   Stress: No Stress Concern Present    Feeling of Stress : Not at all   Housing Stability: Low Risk     Unable to Pay for Housing in the Last Year: No    Number of Places Lived in the Last Year: 0    Unstable Housing in the Last Year: No     Family History   Problem Relation Age of Onset    Other Mother         carotid stenosis    Diabetes Father     Depression Sister     Psychiatric Disorder Brother        No Known Allergies    Objective:  Visit Vitals  /60   Pulse 77   Temp 98.7 °F (37.1 °C) (Temporal)   Resp 20   Ht 5' 11\" (1.803 m)   Wt 140 lb (63.5 kg)   SpO2 99%   BMI 19.53 kg/m²     Physical Exam:   General appearance - alert, well appearing in no distress  Mental status - alert, oriented to person, place, and time  EYE-PERRL, EOMI  ENT-ENT exam normal, no neck nodes or sinus tenderness  Neck - supple, no significant adenopathy   Chest - clear to auscultation, no wheezes, rales or rhonchi  Heart - normal S1, S2, no murmurs  Abdomen - soft, nontender, nondistended, no organomegaly  Ext-peripheral pulses faint, no pedal edema  Neuro - no focal findings   Back-full range of motion, no tenderness, palpable spasm or pain on motion     Results for orders placed or performed in visit on 11/18/21   CBC W/O DIFF   Result Value Ref Range    WBC 12.5 (H) 4.1 - 11.1 K/uL    RBC 5.68 4.10 - 5.70 M/uL    HGB 15.6 12.1 - 17.0 g/dL    HCT 49.6 36.6 - 50.3 %    MCV 87.3 80.0 - 99.0 FL    MCH 27.5 26.0 - 34.0 PG    MCHC 31.5 30.0 - 36.5 g/dL    RDW 14.6 (H) 11.5 - 14.5 %    PLATELET 716 430 - 730 K/uL    MPV 10.5 8.9 - 12.9 FL    NRBC 0.0 0  WBC    ABSOLUTE NRBC 0.00 0.00 - 0.01 K/uL   PSA, DIAGNOSTIC (PROSTATE SPECIFIC AG)   Result Value Ref Range    Prostate Specific Ag 0.9 0.01 - 4.0 ng/mL   VITAMIN D, 25 HYDROXY Result Value Ref Range    Vitamin D 25-Hydroxy 42.9 30 - 100 ng/mL   HEMOGLOBIN A1C WITH EAG   Result Value Ref Range    Hemoglobin A1c 5.6 4.0 - 5.6 %    Est. average glucose 114 mg/dL   LIPID PANEL   Result Value Ref Range    Cholesterol, total 122 <200 MG/DL    Triglyceride 93 <150 MG/DL    HDL Cholesterol 52 MG/DL    LDL, calculated 51.4 0 - 100 MG/DL    VLDL, calculated 18.6 MG/DL    CHOL/HDL Ratio 2.3 0.0 - 5.0     METABOLIC PANEL, COMPREHENSIVE   Result Value Ref Range    Sodium 138 136 - 145 mmol/L    Potassium 3.9 3.5 - 5.1 mmol/L    Chloride 105 97 - 108 mmol/L    CO2 26 21 - 32 mmol/L    Anion gap 7 5 - 15 mmol/L    Glucose 81 65 - 100 mg/dL    BUN 15 6 - 20 MG/DL    Creatinine 0.93 0.70 - 1.30 MG/DL    BUN/Creatinine ratio 16 12 - 20      GFR est AA >60 >60 ml/min/1.73m2    GFR est non-AA >60 >60 ml/min/1.73m2    Calcium 9.7 8.5 - 10.1 MG/DL    Bilirubin, total 0.3 0.2 - 1.0 MG/DL    ALT (SGPT) 24 12 - 78 U/L    AST (SGOT) 21 15 - 37 U/L    Alk. phosphatase 94 45 - 117 U/L    Protein, total 7.8 6.4 - 8.2 g/dL    Albumin 3.7 3.5 - 5.0 g/dL    Globulin 4.1 (H) 2.0 - 4.0 g/dL    A-G Ratio 0.9 (L) 1.1 - 2.2         Assessment/Plan:  Diagnoses and all orders for this visit:    Acute bronchitis due to infection  -     METABOLIC PANEL, COMPREHENSIVE; Future  -     CBC WITH AUTOMATED DIFF; Future  -     cefUROXime (CEFTIN) 500 mg tablet; Take 1 Tablet by mouth two (2) times a day for 7 days. , Normal, Disp-14 Tablet, R-0  -     benzonatate (Tessalon Perles) 100 mg capsule; Take 1 Capsule by mouth two (2) times daily as needed for Cough for up to 7 days. , Normal, Disp-14 Capsule, R-0    Personal history of tobacco use, presenting hazards to health  -     COUNSELING VISIT TO DISCUSS NEED FOR LUNG CANCER SCREENING  -     CT LOW DOSE LUNG CANCER SCREENING; Future    Hypertension, well controlled  -     METABOLIC PANEL, COMPREHENSIVE; Future  -     CBC WITH AUTOMATED DIFF;  Future  -     aspirin delayed-release 81 mg tablet; Take 1 Tablet by mouth daily. , Normal, Disp-30 Tablet, R-3DX Code Needed  . Cerebrovascular accident (CVA) due to thrombosis of precerebral artery ([address]  -     METABOLIC PANEL, COMPREHENSIVE; Future  -     CBC WITH AUTOMATED DIFF; Future  -     aspirin delayed-release 81 mg tablet; Take 1 Tablet by mouth daily. , Normal, Disp-30 Tablet, R-3DX Code Needed  . -     clopidogreL (PLAVIX) 75 mg tab; Take 1 Tablet by mouth daily. , Normal, Disp-75 Tablet, R-3  -     rosuvastatin (CRESTOR) 20 mg tablet; Take 1 Tablet by mouth nightly., Normal, Disp-30 Tablet, R-3    Hypercholesterolemia  -     LIPID PANEL; Future  -     aspirin delayed-release 81 mg tablet; Take 1 Tablet by mouth daily. , Normal, Disp-30 Tablet, R-3DX Code Needed  . -     rosuvastatin (CRESTOR) 20 mg tablet; Take 1 Tablet by mouth nightly., Normal, Disp-30 Tablet, R-3    Borderline diabetes mellitus  -     HEMOGLOBIN A1C WITH EAG; Future    Vitamin D deficiency  -     VITAMIN D, 25 HYDROXY; Future    Benign prostatic hyperplasia without lower urinary tract symptoms  -     PSA, DIAGNOSTIC (PROSTATE SPECIFIC AG); Future    Hypothyroidism, unspecified type  -     TSH 3RD GENERATION; Future    Frequency of urination  -     URINALYSIS W/ RFLX MICROSCOPIC; Future        Patient Instructions        Learning About Lung Cancer Screening  What is screening for lung cancer? Lung cancer screening is a way to find some lung cancers early, before a person has any symptoms of the cancer. Lung cancer screening may help those who have the highest risk for lung cancer--people age 48 and older who are or were heavy smokers. For most people, who aren't at increased risk, screening for lung cancer probably isn't helpful. Screening won't prevent cancer. And it may not find all lung cancers. Lung cancer screening may lower the risk of dying from lung cancer in a small number of people. How is it done?   Lung cancer screening is done with a low-dose CT (computed tomography) scan. A CT scan uses X-rays, or radiation, to make detailed pictures of your body. Experts recommend that screening be done in medical centers that focus on finding and treating lung cancer. Who is screening recommended for? Lung cancer screening is recommended for people age 48 and older who are or were heavy smokers. That means people with a smoking history of at least 20 pack years. A pack year is a way to measure how heavy a smoker you are or were. To figure out your pack years, multiply how many packs a day on average (assuming 20 cigarettes per pack) you have smoked by how many years you have smoked. For example: If you smoked 1 pack a day for 20 years, that's 1 times 20. So you have a smoking history of 20 pack years. If you smoked 2 packs a day for 10 years, that's 2 times 10. So you have a smoking history of 20 pack years. Experts agree that screening is for people who have a high risk of lung cancer. But experts don't agree on what high risk means. Some say people age 48 or older with at least a 20-pack-year smoking history are high risk. Others say it's people age 54 or older with a 30-pack-year history. To see if you could benefit from screening, first find out if you are at high risk for lung cancer. Your doctor can help you decide your lung cancer risk. What are the risks of screening? CT screening for lung cancer isn't perfect. It can show an abnormal result when it turns out there wasn't any cancer. This is called a false-positive result. This means you may need more tests to make sure you don't have cancer. These tests can be harmful and cause a lot of worry. These tests may include more CT scans and invasive testing like a lung biopsy. In a biopsy, the doctor takes a sample of tissue from inside your lung so it can be looked at under a microscope. A biopsy is the only way to tell if you have lung cancer.  If the biopsy finds cancer, you and your doctor will have to decide how or whether to treat it. Some lung cancers found on CT scans are harmless and would not have caused a problem if they had not been found through screening. But because doctors can't tell which ones will turn out to be harmless, most will be treated. This means that you may get treatment--including surgery, radiation, or chemotherapy--that you don't need. There is a risk of damage to cells or tissue from being exposed to radiation, including the small amounts used in CTs, X-rays, and other medical tests. Over time, exposure to radiation may cause cancer and other health problems. But in most cases, the risk of getting cancer from being exposed to small amounts of radiation is low. It's not a reason to avoid these tests for most people. What are the benefits of screening? Your scan may be normal (negative). For some people who are at higher risk, screening lowers the chance of dying of lung cancer. How much and how long you smoked helps to determine your risk level. Screening can find some cancers early, when treatment may be more likely to work. What happens after screening? The results of your CT scan will be sent to your doctor. Someone from your care team will explain the results of your scan and answer any questions you may have. If you need any follow-up, he or she will help you understand what to do next. After a lung cancer screening, you can go back to your usual activities right away. A lung cancer screening test can't tell if you have lung cancer. If your results are positive, your doctor can't tell whether an abnormal finding is a harmless nodule, cancer, or something else without doing more tests. What can you do to help prevent lung cancer? Some lung cancers can't be prevented. But if you smoke, quitting smoking is the best step you can take to prevent lung cancer. If you want to quit, your doctor can recommend medicines or other ways to help.   Follow-up care is a key part of your treatment and safety. Be sure to make and go to all appointments, and call your doctor if you are having problems. It's also a good idea to know your test results and keep a list of the medicines you take. Where can you learn more? Go to http://www.gray.com/  Enter Q940 in the search box to learn more about \"Learning About Lung Cancer Screening. \"  Current as of: May 4, 2022               Content Version: 13.6  © 2006-2023 Healthwise, [employer]. Care instructions adapted under license by [email or web address] (which disclaims liability or warranty for this information). If you have questions about a medical condition or this instruction, always ask your healthcare professional. [address] any warranty or liability for your use of this information. Follow-up and Dispositions    Return 2-3 weeks, for f/u treatment.

## 2023-04-19 NOTE — PROGRESS NOTES
Chief Complaint   Patient presents with    Follow-up     Routine check up     Abdominal Pain    Leg Pain     Left leg pain with left numbness in foot (come & on )     1. Have you been to the ER, urgent care clinic since your last visit? Hospitalized since your last visit? No    2. Have you seen or consulted any other health care providers outside of the [address] [place] [address] since your last visit? Include any pap smears or colon screening.  No

## 2023-04-19 NOTE — PATIENT INSTRUCTIONS

## 2023-04-20 LAB
25(OH)D3+25(OH)D2 SERPL-MCNC: 19.4 NG/ML (ref 30–100)
ALBUMIN SERPL-MCNC: 4.3 G/DL (ref 3.8–4.9)
ALBUMIN/GLOB SERPL: 1.6 {RATIO} (ref 1.2–2.2)
ALP SERPL-CCNC: 101 IU/L (ref 44–121)
ALT SERPL-CCNC: 11 IU/L (ref 0–44)
AST SERPL-CCNC: 17 IU/L (ref 0–40)
BASOPHILS # BLD AUTO: 0.1 X10E3/UL (ref 0–0.2)
BASOPHILS NFR BLD AUTO: 0 %
BILIRUB SERPL-MCNC: <0.2 MG/DL (ref 0–1.2)
BUN SERPL-MCNC: 11 MG/DL (ref 8–27)
BUN/CREAT SERPL: 12 (ref 10–24)
CALCIUM SERPL-MCNC: 9.6 MG/DL (ref 8.6–10.2)
CHLORIDE SERPL-SCNC: 105 MMOL/L (ref 96–106)
CHOLEST SERPL-MCNC: 206 MG/DL (ref 100–199)
CO2 SERPL-SCNC: 24 MMOL/L (ref 20–29)
CREAT SERPL-MCNC: 0.93 MG/DL (ref 0.76–1.27)
EGFRCR SERPLBLD CKD-EPI 2021: 94 ML/MIN/1.73
EOSINOPHIL # BLD AUTO: 0.1 X10E3/UL (ref 0–0.4)
EOSINOPHIL NFR BLD AUTO: 1 %
ERYTHROCYTE [DISTWIDTH] IN BLOOD BY AUTOMATED COUNT: 14.3 % (ref 11.6–15.4)
EST. AVERAGE GLUCOSE BLD GHB EST-MCNC: 120 MG/DL
GLOBULIN SER CALC-MCNC: 2.7 G/DL (ref 1.5–4.5)
GLUCOSE SERPL-MCNC: 77 MG/DL (ref 70–99)
HBA1C MFR BLD: 5.8 % (ref 4.8–5.6)
HCT VFR BLD AUTO: 47.3 % (ref 37.5–51)
HDLC SERPL-MCNC: 45 MG/DL
HGB BLD-MCNC: 15.7 G/DL (ref 13–17.7)
IMM GRANULOCYTES # BLD AUTO: 0 X10E3/UL (ref 0–0.1)
IMM GRANULOCYTES NFR BLD AUTO: 0 %
LDLC SERPL CALC-MCNC: 140 MG/DL (ref 0–99)
LYMPHOCYTES # BLD AUTO: 3.5 X10E3/UL (ref 0.7–3.1)
LYMPHOCYTES NFR BLD AUTO: 28 %
MCH RBC QN AUTO: 28 PG (ref 26.6–33)
MCHC RBC AUTO-ENTMCNC: 33.2 G/DL (ref 31.5–35.7)
MCV RBC AUTO: 85 FL (ref 79–97)
MONOCYTES # BLD AUTO: 0.9 X10E3/UL (ref 0.1–0.9)
MONOCYTES NFR BLD AUTO: 8 %
NEUTROPHILS # BLD AUTO: 7.8 X10E3/UL (ref 1.4–7)
NEUTROPHILS NFR BLD AUTO: 63 %
PLATELET # BLD AUTO: 248 X10E3/UL (ref 150–450)
POTASSIUM SERPL-SCNC: 4.2 MMOL/L (ref 3.5–5.2)
PROT SERPL-MCNC: 7 G/DL (ref 6–8.5)
PSA SERPL-MCNC: 1.4 NG/ML (ref 0–4)
RBC # BLD AUTO: 5.6 X10E6/UL (ref 4.14–5.8)
SODIUM SERPL-SCNC: 145 MMOL/L (ref 134–144)
TRIGL SERPL-MCNC: 117 MG/DL (ref 0–149)
TSH SERPL DL<=0.005 MIU/L-ACNC: 0.53 UIU/ML (ref 0.45–4.5)
VLDLC SERPL CALC-MCNC: 21 MG/DL (ref 5–40)
WBC # BLD AUTO: 12.5 X10E3/UL (ref 3.4–10.8)

## 2023-05-03 ENCOUNTER — HOSPITAL ENCOUNTER (OUTPATIENT)
Dept: CT IMAGING | Age: 63
Discharge: HOME | End: 2023-05-03
Attending: INTERNAL MEDICINE
Payer: MEDICAID

## 2023-05-03 DIAGNOSIS — Z87.891 PERSONAL HISTORY OF TOBACCO USE, PRESENTING HAZARDS TO HEALTH: ICD-10-CM

## 2023-05-03 PROCEDURE — 71271 CT THORAX LUNG CANCER SCR C-: CPT

## 2023-05-10 ENCOUNTER — OFFICE VISIT (OUTPATIENT)
Age: 63
End: 2023-05-10
Payer: MEDICAID

## 2023-05-10 VITALS
TEMPERATURE: 98 F | HEIGHT: 71 IN | RESPIRATION RATE: 20 BRPM | DIASTOLIC BLOOD PRESSURE: 66 MMHG | OXYGEN SATURATION: 98 % | WEIGHT: 140 LBS | SYSTOLIC BLOOD PRESSURE: 97 MMHG | BODY MASS INDEX: 19.6 KG/M2 | HEART RATE: 82 BPM

## 2023-05-10 DIAGNOSIS — M79.672 FOOT PAIN, LEFT: ICD-10-CM

## 2023-05-10 DIAGNOSIS — E55.9 VITAMIN D DEFICIENCY, UNSPECIFIED: Primary | ICD-10-CM

## 2023-05-10 DIAGNOSIS — E78.00 PURE HYPERCHOLESTEROLEMIA, UNSPECIFIED: ICD-10-CM

## 2023-05-10 DIAGNOSIS — R73.03 BORDERLINE DIABETES MELLITUS: ICD-10-CM

## 2023-05-10 PROCEDURE — 99214 OFFICE O/P EST MOD 30 MIN: CPT | Performed by: INTERNAL MEDICINE

## 2023-05-10 RX ORDER — PREDNISONE 10 MG/1
10 TABLET ORAL 2 TIMES DAILY
Qty: 10 TABLET | Refills: 0 | Status: SHIPPED | OUTPATIENT
Start: 2023-05-10 | End: 2023-05-15

## 2023-05-10 RX ORDER — ROSUVASTATIN CALCIUM 20 MG/1
20 TABLET, COATED ORAL DAILY
Qty: 30 TABLET | Refills: 3 | Status: SHIPPED | OUTPATIENT
Start: 2023-05-10

## 2023-05-10 RX ORDER — SENNOSIDES 8.6 MG
650 CAPSULE ORAL EVERY 8 HOURS PRN
Qty: 60 TABLET | Refills: 0 | Status: SHIPPED | OUTPATIENT
Start: 2023-05-10

## 2023-05-10 RX ORDER — ACETAMINOPHEN 160 MG
TABLET,DISINTEGRATING ORAL
Qty: 30 CAPSULE | Refills: 4 | Status: SHIPPED | OUTPATIENT
Start: 2023-05-10

## 2023-05-10 ASSESSMENT — PATIENT HEALTH QUESTIONNAIRE - PHQ9
1. LITTLE INTEREST OR PLEASURE IN DOING THINGS: 0
SUM OF ALL RESPONSES TO PHQ QUESTIONS 1-9: 0
2. FEELING DOWN, DEPRESSED OR HOPELESS: 0
SUM OF ALL RESPONSES TO PHQ QUESTIONS 1-9: 0
SUM OF ALL RESPONSES TO PHQ9 QUESTIONS 1 & 2: 0

## 2023-05-10 ASSESSMENT — ANXIETY QUESTIONNAIRES
3. WORRYING TOO MUCH ABOUT DIFFERENT THINGS: 0
2. NOT BEING ABLE TO STOP OR CONTROL WORRYING: 0
1. FEELING NERVOUS, ANXIOUS, OR ON EDGE: 0
GAD7 TOTAL SCORE: 0
4. TROUBLE RELAXING: 0
5. BEING SO RESTLESS THAT IT IS HARD TO SIT STILL: 0
6. BECOMING EASILY ANNOYED OR IRRITABLE: 0
IF YOU CHECKED OFF ANY PROBLEMS ON THIS QUESTIONNAIRE, HOW DIFFICULT HAVE THESE PROBLEMS MADE IT FOR YOU TO DO YOUR WORK, TAKE CARE OF THINGS AT HOME, OR GET ALONG WITH OTHER PEOPLE: NOT DIFFICULT AT ALL
7. FEELING AFRAID AS IF SOMETHING AWFUL MIGHT HAPPEN: 0

## 2023-05-10 NOTE — PROGRESS NOTES
Chief Complaint   Patient presents with    Results    Leg Pain     Front foot left pain, black toe nail  Pain radiating up the left buttocks      HPI:History is obtained through an . Nimo Rosen is a 63 y.o. male with significant medical below presents to discuss results  Results show low serum vit D level; tot chol & LDL are tending up. Patient stopped taking atorvastin.   Leg Pain (Front foot left pain, black toe nail/Pain radiating up the left buttocks ), and Rectal Pain    Review of Systems  As per hpi    Past Medical History:   Diagnosis Date    CAD (coronary artery disease)     Carotid stenosis     Current smoker     Hypercholesterolemia     Hypertension     Pancreatitis     Pancreatitis, chronic (HCC)     Pseudocyst of pancreas     PUD (peptic ulcer disease)     Stroke [facility])      Past Surgical History:   Procedure Laterality Date    CHOLECYSTECTOMY      CT RETROPERITONEAL PERC DRAIN  8/13/2020    CT RETROPERITONEAL PERC DRAIN 8/13/2020 [facility] RAD CT    HEENT      deviated septum    ORTHOPEDIC SURGERY      jaw surgery with plates/screws post trauma    OTHER SURGICAL HISTORY      \"pancrease surgery\"    UPPER GI ENDOSCOPY,BIOPSY  8/28/2020         UPPER GI ENDOSCOPY,BIOPSY  8/19/2020         UROLOGICAL SURGERY      urethra surgery post trauma     Social History     Socioeconomic History    Marital status: Single   Tobacco Use    Smoking status: Every Day     Packs/day: 0.50     Types: Cigarettes    Smokeless tobacco: Never   Substance and Sexual Activity    Alcohol use: Not Currently    Drug use: Yes     Types: Marijuana Deanna Florencio)     Social Determinants of Health     Financial Resource Strain: Low Risk     Difficulty of Paying Living Expenses: Not hard at all   Food Insecurity: No Food Insecurity    Worried About Running Out of Food in the Last Year: Never true    Ran Out of Food in the Last Year: Never true   Transportation Needs: No Transportation Needs    Lack of Transportation (Medical): No    Lack

## 2023-06-01 ENCOUNTER — OFFICE VISIT (OUTPATIENT)
Age: 63
End: 2023-06-01
Payer: MEDICAID

## 2023-06-01 VITALS
BODY MASS INDEX: 18.62 KG/M2 | HEART RATE: 63 BPM | OXYGEN SATURATION: 99 % | RESPIRATION RATE: 20 BRPM | DIASTOLIC BLOOD PRESSURE: 68 MMHG | TEMPERATURE: 97.8 F | SYSTOLIC BLOOD PRESSURE: 90 MMHG | WEIGHT: 133 LBS | HEIGHT: 71 IN

## 2023-06-01 DIAGNOSIS — I73.9 CLAUDICATION (HCC): Primary | ICD-10-CM

## 2023-06-01 DIAGNOSIS — J98.4 PULMONARY CAVITARY LESION: ICD-10-CM

## 2023-06-01 DIAGNOSIS — E78.00 PURE HYPERCHOLESTEROLEMIA, UNSPECIFIED: ICD-10-CM

## 2023-06-01 DIAGNOSIS — I63.00 CEREBRAL INFARCTION DUE TO THROMBOSIS OF UNSPECIFIED PRECEREBRAL ARTERY (HCC): ICD-10-CM

## 2023-06-01 DIAGNOSIS — E55.9 VITAMIN D DEFICIENCY, UNSPECIFIED: ICD-10-CM

## 2023-06-01 PROCEDURE — 99214 OFFICE O/P EST MOD 30 MIN: CPT | Performed by: INTERNAL MEDICINE

## 2023-06-01 RX ORDER — CLOPIDOGREL BISULFATE 75 MG/1
75 TABLET ORAL DAILY
Qty: 30 TABLET | Refills: 3 | Status: SHIPPED | OUTPATIENT
Start: 2023-06-01

## 2023-06-01 RX ORDER — ACETAMINOPHEN 160 MG
TABLET,DISINTEGRATING ORAL
Qty: 30 CAPSULE | Refills: 4 | Status: SHIPPED | OUTPATIENT
Start: 2023-06-01

## 2023-06-01 RX ORDER — ROSUVASTATIN CALCIUM 20 MG/1
20 TABLET, COATED ORAL DAILY
Qty: 30 TABLET | Refills: 3 | Status: SHIPPED | OUTPATIENT
Start: 2023-06-01

## 2023-06-01 NOTE — CONSULTS
Session ID: [id number]  Request ID: [id number]  Language: Irish  Status: Fulfilled   ID: #[id number]   Name: Glenroy Ariza

## 2023-06-27 ENCOUNTER — HOSPITAL ENCOUNTER (OUTPATIENT)
Facility: HOSPITAL | Age: 63
Discharge: HOME | End: 2023-06-29
Attending: INTERNAL MEDICINE
Payer: MEDICAID

## 2023-06-27 DIAGNOSIS — I73.9 CLAUDICATION (HCC): ICD-10-CM

## 2023-06-27 LAB
VAS LEFT ABI: 0.37
VAS LEFT ARM BP: 106 MMHG
VAS LEFT DORSALIS PEDIS BP: 51 MMHG
VAS LEFT PTA BP: 52 MMHG
VAS LEFT TBI: 0.12
VAS LEFT TOE PRESSURE: 17 MMHG
VAS RIGHT ABI: 0.8
VAS RIGHT ARM BP: 142 MMHG
VAS RIGHT DORSALIS PEDIS BP: 113 MMHG
VAS RIGHT PTA BP: 108 MMHG
VAS RIGHT TBI: 0.73
VAS RIGHT TOE PRESSURE: 104 MMHG

## 2023-06-27 PROCEDURE — 93922 UPR/L XTREMITY ART 2 LEVELS: CPT

## 2023-06-27 PROCEDURE — 93922 UPR/L XTREMITY ART 2 LEVELS: CPT | Performed by: SURGERY

## 2023-10-24 ENCOUNTER — OFFICE VISIT (OUTPATIENT)
Age: 63
End: 2023-10-24
Payer: MEDICAID

## 2023-10-24 VITALS
BODY MASS INDEX: 19.04 KG/M2 | TEMPERATURE: 98.6 F | HEART RATE: 77 BPM | DIASTOLIC BLOOD PRESSURE: 60 MMHG | HEIGHT: 71 IN | OXYGEN SATURATION: 98 % | SYSTOLIC BLOOD PRESSURE: 121 MMHG | WEIGHT: 136 LBS | RESPIRATION RATE: 20 BRPM

## 2023-10-24 DIAGNOSIS — I63.00 CEREBRAL INFARCTION DUE TO THROMBOSIS OF UNSPECIFIED PRECEREBRAL ARTERY (HCC): ICD-10-CM

## 2023-10-24 DIAGNOSIS — J43.9 PULMONARY EMPHYSEMA, UNSPECIFIED EMPHYSEMA TYPE (HCC): Primary | ICD-10-CM

## 2023-10-24 DIAGNOSIS — R73.03 BORDERLINE DIABETES MELLITUS: ICD-10-CM

## 2023-10-24 DIAGNOSIS — Z11.4 ENCOUNTER FOR SCREENING FOR HUMAN IMMUNODEFICIENCY VIRUS (HIV): ICD-10-CM

## 2023-10-24 DIAGNOSIS — E55.9 VITAMIN D DEFICIENCY, UNSPECIFIED: ICD-10-CM

## 2023-10-24 DIAGNOSIS — E78.00 PURE HYPERCHOLESTEROLEMIA, UNSPECIFIED: ICD-10-CM

## 2023-10-24 PROCEDURE — 99214 OFFICE O/P EST MOD 30 MIN: CPT | Performed by: INTERNAL MEDICINE

## 2023-10-24 RX ORDER — ACETAMINOPHEN 160 MG
TABLET,DISINTEGRATING ORAL
Qty: 30 CAPSULE | Refills: 4 | Status: SHIPPED | OUTPATIENT
Start: 2023-10-24

## 2023-10-24 RX ORDER — CLOPIDOGREL BISULFATE 75 MG/1
75 TABLET ORAL DAILY
Qty: 30 TABLET | Refills: 3 | Status: SHIPPED | OUTPATIENT
Start: 2023-10-24

## 2023-10-24 RX ORDER — ASPIRIN 81 MG/1
TABLET ORAL
Qty: 30 TABLET | Refills: 1 | Status: SHIPPED | OUTPATIENT
Start: 2023-10-24

## 2023-10-24 RX ORDER — ROSUVASTATIN CALCIUM 20 MG/1
20 TABLET, COATED ORAL DAILY
Qty: 30 TABLET | Refills: 3 | Status: SHIPPED | OUTPATIENT
Start: 2023-10-24

## 2023-10-24 ASSESSMENT — PATIENT HEALTH QUESTIONNAIRE - PHQ9
SUM OF ALL RESPONSES TO PHQ QUESTIONS 1-9: 0
SUM OF ALL RESPONSES TO PHQ QUESTIONS 1-9: 0
SUM OF ALL RESPONSES TO PHQ9 QUESTIONS 1 & 2: 0
SUM OF ALL RESPONSES TO PHQ QUESTIONS 1-9: 0
SUM OF ALL RESPONSES TO PHQ QUESTIONS 1-9: 0
1. LITTLE INTEREST OR PLEASURE IN DOING THINGS: 0
2. FEELING DOWN, DEPRESSED OR HOPELESS: 0

## 2023-11-27 DIAGNOSIS — E78.00 PURE HYPERCHOLESTEROLEMIA, UNSPECIFIED: ICD-10-CM

## 2023-11-27 DIAGNOSIS — R73.03 BORDERLINE DIABETES MELLITUS: ICD-10-CM

## 2023-11-27 DIAGNOSIS — J43.9 PULMONARY EMPHYSEMA, UNSPECIFIED EMPHYSEMA TYPE (HCC): ICD-10-CM

## 2023-11-27 DIAGNOSIS — E55.9 VITAMIN D DEFICIENCY, UNSPECIFIED: ICD-10-CM

## 2023-11-27 DIAGNOSIS — Z11.4 ENCOUNTER FOR SCREENING FOR HUMAN IMMUNODEFICIENCY VIRUS (HIV): ICD-10-CM

## 2023-11-28 LAB
25(OH)D3 SERPL-MCNC: 35.3 NG/ML (ref 30–100)
ALBUMIN SERPL-MCNC: 3.9 G/DL (ref 3.5–5)
ALBUMIN/GLOB SERPL: 1.1 (ref 1.1–2.2)
ALP SERPL-CCNC: 100 U/L (ref 45–117)
ALT SERPL-CCNC: 22 U/L (ref 12–78)
ANION GAP SERPL CALC-SCNC: 4 MMOL/L (ref 5–15)
AST SERPL-CCNC: 20 U/L (ref 15–37)
BILIRUB SERPL-MCNC: 0.3 MG/DL (ref 0.2–1)
BUN SERPL-MCNC: 11 MG/DL (ref 6–20)
BUN/CREAT SERPL: 11 (ref 12–20)
CALCIUM SERPL-MCNC: 9.7 MG/DL (ref 8.5–10.1)
CHLORIDE SERPL-SCNC: 107 MMOL/L (ref 97–108)
CHOLEST SERPL-MCNC: 147 MG/DL
CO2 SERPL-SCNC: 30 MMOL/L (ref 21–32)
CREAT SERPL-MCNC: 0.98 MG/DL (ref 0.7–1.3)
EST. AVERAGE GLUCOSE BLD GHB EST-MCNC: 120 MG/DL
GLOBULIN SER CALC-MCNC: 3.6 G/DL (ref 2–4)
GLUCOSE SERPL-MCNC: 90 MG/DL (ref 65–100)
HBA1C MFR BLD: 5.8 % (ref 4–5.6)
HDLC SERPL-MCNC: 56 MG/DL
HDLC SERPL: 2.6 (ref 0–5)
HIV 1+2 AB+HIV1 P24 AG SERPL QL IA: NONREACTIVE
HIV 1/2 RESULT COMMENT: NORMAL
LDLC SERPL CALC-MCNC: 69.8 MG/DL (ref 0–100)
POTASSIUM SERPL-SCNC: 3.9 MMOL/L (ref 3.5–5.1)
PROT SERPL-MCNC: 7.5 G/DL (ref 6.4–8.2)
SODIUM SERPL-SCNC: 141 MMOL/L (ref 136–145)
TRIGL SERPL-MCNC: 106 MG/DL
VLDLC SERPL CALC-MCNC: 21.2 MG/DL

## 2024-01-24 ENCOUNTER — HOSPITAL ENCOUNTER (OUTPATIENT)
Facility: HOSPITAL | Age: 64
Discharge: HOME | End: 2024-01-27
Attending: INTERNAL MEDICINE
Payer: MEDICAID

## 2024-01-24 DIAGNOSIS — J98.4 CAVITARY LESION OF LUNG: ICD-10-CM

## 2024-01-24 PROCEDURE — 71250 CT THORAX DX C-: CPT

## 2024-03-01 ENCOUNTER — TRANSCRIBE ORDERS (OUTPATIENT)
Facility: HOSPITAL | Age: 64
End: 2024-03-01

## 2024-03-01 DIAGNOSIS — I65.21 STENOSIS OF RIGHT CAROTID ARTERY: Primary | ICD-10-CM

## 2024-03-01 DIAGNOSIS — Z86.73 HISTORY OF STROKE: ICD-10-CM

## 2024-03-13 ENCOUNTER — HOSPITAL ENCOUNTER (OUTPATIENT)
Facility: HOSPITAL | Age: 64
Discharge: HOME | End: 2024-03-15
Attending: INTERNAL MEDICINE
Payer: MEDICAID

## 2024-03-13 DIAGNOSIS — Z86.73 HISTORY OF STROKE: ICD-10-CM

## 2024-03-13 DIAGNOSIS — I65.21 STENOSIS OF RIGHT CAROTID ARTERY: ICD-10-CM

## 2024-03-13 PROCEDURE — 93880 EXTRACRANIAL BILAT STUDY: CPT

## 2024-03-15 LAB
VAS LEFT CCA DIST EDV: 32.7 CM/S
VAS LEFT CCA DIST PSV: 162.2 CM/S
VAS LEFT CCA PROX EDV: 24.1 CM/S
VAS LEFT CCA PROX PSV: 94 CM/S
VAS LEFT ECA EDV: 19.4 CM/S
VAS LEFT ECA PSV: 172.8 CM/S
VAS LEFT ICA DIST EDV: 34.5 CM/S
VAS LEFT ICA DIST PSV: 93.4 CM/S
VAS LEFT ICA MID EDV: 36.4 CM/S
VAS LEFT ICA MID PSV: 110 CM/S
VAS LEFT ICA PROX EDV: 19.5 CM/S
VAS LEFT ICA PROX PSV: 127.1 CM/S
VAS LEFT ICA/CCA PSV: 0.8 NO UNITS
VAS LEFT SUBCLAVIAN PROX EDV: 13 CM/S
VAS LEFT SUBCLAVIAN PROX PSV: 105.1 CM/S
VAS LEFT VERTEBRAL EDV: 7.4 CM/S
VAS LEFT VERTEBRAL PSV: 60.7 CM/S
VAS RIGHT CCA DIST EDV: 25.8 CM/S
VAS RIGHT CCA DIST PSV: 101.4 CM/S
VAS RIGHT CCA PROX EDV: 17.6 CM/S
VAS RIGHT CCA PROX PSV: 72.4 CM/S
VAS RIGHT ECA EDV: 114.7 CM/S
VAS RIGHT ECA PSV: 430.5 CM/S
VAS RIGHT ICA DIST EDV: 26.7 CM/S
VAS RIGHT ICA DIST PSV: 95.9 CM/S
VAS RIGHT ICA MID EDV: 28.6 CM/S
VAS RIGHT ICA MID PSV: 91.9 CM/S
VAS RIGHT ICA PROX EDV: 19.2 CM/S
VAS RIGHT ICA PROX PSV: 81.5 CM/S
VAS RIGHT ICA/CCA PSV: 0.9 NO UNITS
VAS RIGHT SUBCLAVIAN PROX EDV: 0 CM/S
VAS RIGHT SUBCLAVIAN PROX PSV: 162.8 CM/S
VAS RIGHT VERTEBRAL EDV: 17.9 CM/S
VAS RIGHT VERTEBRAL PSV: 99 CM/S

## 2024-04-01 ENCOUNTER — HOSPITAL ENCOUNTER (OUTPATIENT)
Facility: HOSPITAL | Age: 64
Discharge: HOME | End: 2024-04-03
Attending: INTERNAL MEDICINE
Payer: MEDICAID

## 2024-04-01 PROCEDURE — 93922 UPR/L XTREMITY ART 2 LEVELS: CPT

## 2024-04-12 ENCOUNTER — TRANSCRIBE ORDERS (OUTPATIENT)
Facility: HOSPITAL | Age: 64
End: 2024-04-12

## 2024-04-12 DIAGNOSIS — R91.1 PULMONARY NODULE: Primary | ICD-10-CM

## 2024-04-16 ENCOUNTER — APPOINTMENT (OUTPATIENT)
Facility: HOSPITAL | Age: 64
End: 2024-04-16
Payer: MEDICAID

## 2024-04-16 ENCOUNTER — HOSPITAL ENCOUNTER (EMERGENCY)
Facility: HOSPITAL | Age: 64
Discharge: HOME | End: 2024-04-17
Attending: STUDENT IN AN ORGANIZED HEALTH CARE EDUCATION/TRAINING PROGRAM
Payer: MEDICAID

## 2024-04-16 DIAGNOSIS — I70.0 AORTIC ATHEROSCLEROSIS (HCC): ICD-10-CM

## 2024-04-16 DIAGNOSIS — I74.5 OCCLUSION OF LEFT ILIAC ARTERY (HCC): ICD-10-CM

## 2024-04-16 DIAGNOSIS — F17.200 TOBACCO USE DISORDER: ICD-10-CM

## 2024-04-16 DIAGNOSIS — R10.13 EPIGASTRIC PAIN: Primary | ICD-10-CM

## 2024-04-16 DIAGNOSIS — R74.8 SERUM LIPASE ELEVATION: ICD-10-CM

## 2024-04-16 LAB
ALBUMIN SERPL-MCNC: 3.6 G/DL (ref 3.5–5)
ALBUMIN/GLOB SERPL: 0.8 (ref 1.1–2.2)
ALP SERPL-CCNC: 112 U/L (ref 45–117)
ALT SERPL-CCNC: 13 U/L (ref 12–78)
ANION GAP SERPL CALC-SCNC: 3 MMOL/L (ref 5–15)
APPEARANCE UR: CLEAR
AST SERPL-CCNC: 16 U/L (ref 15–37)
BACTERIA URNS QL MICRO: NEGATIVE /HPF
BASOPHILS # BLD: 0.1 K/UL (ref 0–0.1)
BASOPHILS NFR BLD: 0 % (ref 0–1)
BILIRUB SERPL-MCNC: 0.5 MG/DL (ref 0.2–1)
BILIRUB UR QL: NEGATIVE
BUN SERPL-MCNC: 8 MG/DL (ref 6–20)
BUN/CREAT SERPL: 7 (ref 12–20)
CALCIUM SERPL-MCNC: 9.4 MG/DL (ref 8.5–10.1)
CHLORIDE SERPL-SCNC: 102 MMOL/L (ref 97–108)
CO2 SERPL-SCNC: 30 MMOL/L (ref 21–32)
COLOR UR: ABNORMAL
COMMENT:: NORMAL
CREAT SERPL-MCNC: 1.1 MG/DL (ref 0.7–1.3)
DIFFERENTIAL METHOD BLD: ABNORMAL
EOSINOPHIL # BLD: 0.1 K/UL (ref 0–0.4)
EOSINOPHIL NFR BLD: 0 % (ref 0–7)
EPITH CASTS URNS QL MICRO: ABNORMAL /LPF
ERYTHROCYTE [DISTWIDTH] IN BLOOD BY AUTOMATED COUNT: 15.6 % (ref 11.5–14.5)
GLOBULIN SER CALC-MCNC: 4.5 G/DL (ref 2–4)
GLUCOSE SERPL-MCNC: 145 MG/DL (ref 65–100)
GLUCOSE UR STRIP.AUTO-MCNC: NEGATIVE MG/DL
HCT VFR BLD AUTO: 53.8 % (ref 36.6–50.3)
HGB BLD-MCNC: 17.2 G/DL (ref 12.1–17)
HGB UR QL STRIP: NEGATIVE
HYALINE CASTS URNS QL MICRO: ABNORMAL /LPF (ref 0–2)
IMM GRANULOCYTES # BLD AUTO: 0.1 K/UL (ref 0–0.04)
IMM GRANULOCYTES NFR BLD AUTO: 0 % (ref 0–0.5)
KETONES UR QL STRIP.AUTO: ABNORMAL MG/DL
LEUKOCYTE ESTERASE UR QL STRIP.AUTO: ABNORMAL
LIPASE SERPL-CCNC: 173 U/L (ref 13–75)
LYMPHOCYTES # BLD: 3.4 K/UL (ref 0.8–3.5)
LYMPHOCYTES NFR BLD: 19 % (ref 12–49)
MCH RBC QN AUTO: 27.4 PG (ref 26–34)
MCHC RBC AUTO-ENTMCNC: 32 G/DL (ref 30–36.5)
MCV RBC AUTO: 85.8 FL (ref 80–99)
MONOCYTES # BLD: 1.1 K/UL (ref 0–1)
MONOCYTES NFR BLD: 6 % (ref 5–13)
NEUTS SEG # BLD: 13.3 K/UL (ref 1.8–8)
NEUTS SEG NFR BLD: 75 % (ref 32–75)
NITRITE UR QL STRIP.AUTO: NEGATIVE
NRBC # BLD: 0 K/UL (ref 0–0.01)
NRBC BLD-RTO: 0 PER 100 WBC
PH UR STRIP: 7 (ref 5–8)
PLATELET # BLD AUTO: 270 K/UL (ref 150–400)
PMV BLD AUTO: 10 FL (ref 8.9–12.9)
POTASSIUM SERPL-SCNC: 3.6 MMOL/L (ref 3.5–5.1)
PROT SERPL-MCNC: 8.1 G/DL (ref 6.4–8.2)
PROT UR STRIP-MCNC: NEGATIVE MG/DL
RBC # BLD AUTO: 6.27 M/UL (ref 4.1–5.7)
RBC #/AREA URNS HPF: ABNORMAL /HPF (ref 0–5)
SODIUM SERPL-SCNC: 135 MMOL/L (ref 136–145)
SP GR UR REFRACTOMETRY: 1.01
SPECIMEN HOLD: NORMAL
URINE CULTURE IF INDICATED: ABNORMAL
UROBILINOGEN UR QL STRIP.AUTO: 1 EU/DL (ref 0.2–1)
WBC # BLD AUTO: 18 K/UL (ref 4.1–11.1)
WBC URNS QL MICRO: ABNORMAL /HPF (ref 0–4)

## 2024-04-16 PROCEDURE — 96361 HYDRATE IV INFUSION ADD-ON: CPT

## 2024-04-16 PROCEDURE — 6360000002 HC RX W HCPCS: Performed by: STUDENT IN AN ORGANIZED HEALTH CARE EDUCATION/TRAINING PROGRAM

## 2024-04-16 PROCEDURE — 81001 URINALYSIS AUTO W/SCOPE: CPT

## 2024-04-16 PROCEDURE — 74174 CTA ABD&PLVS W/CONTRAST: CPT

## 2024-04-16 PROCEDURE — 99285 EMERGENCY DEPT VISIT HI MDM: CPT

## 2024-04-16 PROCEDURE — 85025 COMPLETE CBC W/AUTO DIFF WBC: CPT

## 2024-04-16 PROCEDURE — 6360000004 HC RX CONTRAST MEDICATION: Performed by: STUDENT IN AN ORGANIZED HEALTH CARE EDUCATION/TRAINING PROGRAM

## 2024-04-16 PROCEDURE — 36415 COLL VENOUS BLD VENIPUNCTURE: CPT

## 2024-04-16 PROCEDURE — 96375 TX/PRO/DX INJ NEW DRUG ADDON: CPT

## 2024-04-16 PROCEDURE — 83690 ASSAY OF LIPASE: CPT

## 2024-04-16 PROCEDURE — 80053 COMPREHEN METABOLIC PANEL: CPT

## 2024-04-16 PROCEDURE — 96374 THER/PROPH/DIAG INJ IV PUSH: CPT

## 2024-04-16 PROCEDURE — 2580000003 HC RX 258: Performed by: STUDENT IN AN ORGANIZED HEALTH CARE EDUCATION/TRAINING PROGRAM

## 2024-04-16 RX ORDER — MORPHINE SULFATE 4 MG/ML
4 INJECTION, SOLUTION INTRAMUSCULAR; INTRAVENOUS ONCE
Status: COMPLETED | OUTPATIENT
Start: 2024-04-16 | End: 2024-04-16

## 2024-04-16 RX ORDER — ONDANSETRON 2 MG/ML
4 INJECTION INTRAMUSCULAR; INTRAVENOUS ONCE
Status: COMPLETED | OUTPATIENT
Start: 2024-04-16 | End: 2024-04-16

## 2024-04-16 RX ORDER — 0.9 % SODIUM CHLORIDE 0.9 %
1000 INTRAVENOUS SOLUTION INTRAVENOUS ONCE
Status: COMPLETED | OUTPATIENT
Start: 2024-04-16 | End: 2024-04-17

## 2024-04-16 RX ADMIN — MORPHINE SULFATE 4 MG: 4 INJECTION INTRAVENOUS at 21:53

## 2024-04-16 RX ADMIN — SODIUM CHLORIDE 1000 ML: 9 INJECTION, SOLUTION INTRAVENOUS at 21:53

## 2024-04-16 RX ADMIN — ONDANSETRON 4 MG: 2 INJECTION INTRAMUSCULAR; INTRAVENOUS at 21:52

## 2024-04-16 RX ADMIN — IOPAMIDOL 100 ML: 755 INJECTION, SOLUTION INTRAVENOUS at 22:25

## 2024-04-16 ASSESSMENT — PAIN DESCRIPTION - LOCATION: LOCATION: ABDOMEN

## 2024-04-16 ASSESSMENT — PAIN DESCRIPTION - ORIENTATION: ORIENTATION: RIGHT;LOWER

## 2024-04-16 ASSESSMENT — PAIN - FUNCTIONAL ASSESSMENT: PAIN_FUNCTIONAL_ASSESSMENT: 0-10

## 2024-04-16 ASSESSMENT — PAIN SCALES - GENERAL: PAINLEVEL_OUTOF10: 10

## 2024-04-17 VITALS
RESPIRATION RATE: 16 BRPM | HEART RATE: 84 BPM | OXYGEN SATURATION: 96 % | TEMPERATURE: 98.3 F | SYSTOLIC BLOOD PRESSURE: 175 MMHG | DIASTOLIC BLOOD PRESSURE: 87 MMHG | BODY MASS INDEX: 18.97 KG/M2 | HEIGHT: 71 IN

## 2024-04-17 RX ORDER — OXYCODONE HYDROCHLORIDE 5 MG/1
5 TABLET ORAL EVERY 8 HOURS PRN
Qty: 9 TABLET | Refills: 0 | Status: SHIPPED | OUTPATIENT
Start: 2024-04-17 | End: 2024-04-20

## 2024-04-17 RX ORDER — ONDANSETRON 4 MG/1
4 TABLET, ORALLY DISINTEGRATING ORAL 3 TIMES DAILY PRN
Qty: 21 TABLET | Refills: 0 | Status: SHIPPED | OUTPATIENT
Start: 2024-04-17

## 2024-04-17 NOTE — ED PROVIDER NOTES
chronic (HCC)     Pseudocyst of pancreas     PUD (peptic ulcer disease)     Stroke (HCC)        Past Surgical History:  Past Surgical History:   Procedure Laterality Date    CHOLECYSTECTOMY      CT RETROPERITONEAL PERC DRAIN  8/13/2020    CT RETROPERITONEAL PERC DRAIN 8/13/2020 [facility] RAD CT    HEENT      deviated septum    ORTHOPEDIC SURGERY      jaw surgery with plates/screws post trauma    OTHER SURGICAL HISTORY      \"pancrease surgery\"    UPPER GI ENDOSCOPY,BIOPSY  8/28/2020         UPPER GI ENDOSCOPY,BIOPSY  8/19/2020         UROLOGICAL SURGERY      urethra surgery post trauma       Family History:  Family History   Problem Relation Age of Onset    Psychiatric Disorder Brother     Depression Sister     Other Mother         carotid stenosis    Diabetes Father        Social History:  Social History     Tobacco Use    Smoking status: Every Day     Current packs/day: 0.50     Types: Cigarettes    Smokeless tobacco: Never   Substance Use Topics    Alcohol use: Not Currently    Drug use: Yes     Types: Marijuana (Weed)       Allergies:  No Known Allergies    CURRENT MEDICATIONS      Discharge Medication List as of 4/17/2024 12:53 AM        CONTINUE these medications which have NOT CHANGED    Details   albuterol sulfate (PROAIR RESPICLICK) 108 (90 Base) MCG/ACT aerosol powder inhalation Inhale into the lungs every 4 hours as needed for Wheezing or Shortness of BreathHistorical Med      Cholecalciferol (VITAMIN D3) 50 MCG (2000 UT) CAPS Take 1 cap by mouth daily, Disp-30 capsule, R-4Normal      rosuvastatin (CRESTOR) 20 MG tablet Take 1 tablet by mouth daily Take by mouth, Disp-30 tablet, R-3Normal      clopidogrel (PLAVIX) 75 MG tablet Take 1 tablet by mouth daily, Disp-30 tablet, R-3Normal      aspirin 81 MG EC tablet TOME ELAINA TABLETA TODOS LOS GENAO, Disp-30 tablet, R-1Normal      sildenafil (VIAGRA) 50 MG tablet Take by mouth daily as neededHistorical Med             SCREENINGS               No data recorded

## 2024-04-17 NOTE — ED NOTES
Patient discharged from the ED by ANALIA Tristan. Diagnosis, medications, precautions and follow-ups were reviewed with the patient/family. Questions were asked and answered prior to departure. Patient departed the ED via walk-out and was accompanied by patricia.

## 2024-04-23 ENCOUNTER — HOSPITAL ENCOUNTER (OUTPATIENT)
Facility: HOSPITAL | Age: 64
Discharge: HOME | End: 2024-04-26
Attending: INTERNAL MEDICINE
Payer: MEDICAID

## 2024-04-23 DIAGNOSIS — R91.1 PULMONARY NODULE: ICD-10-CM

## 2024-04-23 PROCEDURE — 71250 CT THORAX DX C-: CPT

## 2024-12-26 ENCOUNTER — HOSPITAL ENCOUNTER (OUTPATIENT)
Facility: HOSPITAL | Age: 64
Discharge: HOME | End: 2024-12-29
Attending: INTERNAL MEDICINE
Payer: MEDICAID

## 2024-12-26 DIAGNOSIS — Z87.891 PERSONAL HISTORY OF TOBACCO USE: ICD-10-CM

## 2024-12-26 DIAGNOSIS — F17.210 NICOTINE DEPENDENCE, CIGARETTES, UNCOMPLICATED: ICD-10-CM

## 2024-12-26 PROCEDURE — 71271 CT THORAX LUNG CANCER SCR C-: CPT

## 2025-03-18 NOTE — PROGRESS NOTES
Chief Complaint   Patient presents with    Results     HPI:  Bishop Collado is a 63 y.o. male presents to review lab results  Serum Vit D level is low. Advised to resum supplement. Serum cholesterol is trending up. Advised to resume treatment. A1C is at borderline diabetes level.  Diet and exercise  Patient has c/o pain in legs when walking, L>L    Review of Systems  As per hpi    Past Medical History:   Diagnosis Date    CAD (coronary artery disease)     Carotid stenosis     Current smoker     Hypercholesterolemia     Hypertension     Pancreatitis     Pancreatitis, chronic (HCC)     Pseudocyst of pancreas     PUD (peptic ulcer disease)     Stroke [facility])      Past Surgical History:   Procedure Laterality Date    CHOLECYSTECTOMY      CT RETROPERITONEAL PERC DRAIN  8/13/2020    CT RETROPERITONEAL PERC DRAIN 8/13/2020 [facility] RAD CT    HEENT      deviated septum    ORTHOPEDIC SURGERY      jaw surgery with plates/screws post trauma    OTHER SURGICAL HISTORY      \"pancrease surgery\"    UPPER GI ENDOSCOPY,BIOPSY  8/28/2020         UPPER GI ENDOSCOPY,BIOPSY  8/19/2020         UROLOGICAL SURGERY      urethra surgery post trauma     Social History     Socioeconomic History    Marital status: Single   Tobacco Use    Smoking status: Every Day     Packs/day: 0.50     Types: Cigarettes    Smokeless tobacco: Never   Substance and Sexual Activity    Alcohol use: Not Currently    Drug use: Yes     Types: Marijuana Andre Luciano)     Social Determinants of Health     Financial Resource Strain: Low Risk     Difficulty of Paying Living Expenses: Not hard at all   Food Insecurity: No Food Insecurity    Worried About Running Out of Food in the Last Year: Never true    Ran Out of Food in the Last Year: Never true   Transportation Needs: No Transportation Needs    Lack of Transportation (Medical): No    Lack of Transportation (Non-Medical): No   Stress: No Stress Concern Present    Feeling of Stress : Not at all   Intimate Partner Violence: Not At No no

## (undated) DEVICE — INTRODUCER SHTH 0.018 IN 21 GAX7 CM TRANSCAROTID ACCS KT

## (undated) DEVICE — AV FISTULA - MRMC: Brand: MEDLINE INDUSTRIES, INC.

## (undated) DEVICE — NEONATAL-ADULT SPO2 SENSOR: Brand: NELLCOR

## (undated) DEVICE — AGENT HEMSTAT W4XL4IN OXIDIZED REGENERATED CELOS ABSRB SFT

## (undated) DEVICE — DRAPE,UTILTY,TAPE,15X26, 4EA/PK: Brand: MEDLINE

## (undated) DEVICE — SUT SLK 2-0SH 30IN BLK --

## (undated) DEVICE — SET ADMIN 16ML TBNG L100IN 2 Y INJ SITE IV PIGGY BK DISP

## (undated) DEVICE — DERMABOND SKIN ADH 0.7ML -- DERMABOND ADVANCED 12/BX

## (undated) DEVICE — Device

## (undated) DEVICE — C-ARM: Brand: UNBRANDED

## (undated) DEVICE — DRAPE,LAPAROTOMY,PED,STERILE: Brand: MEDLINE

## (undated) DEVICE — GUIDEWIRE ENDOSCP L150CM DIA0.035IN TIP L15CM BENT PTFE

## (undated) DEVICE — SYR 10ML LUER LOK 1/5ML GRAD --

## (undated) DEVICE — GLOVE ORTHO 8   MSG9480

## (undated) DEVICE — ANGLED-TIP ARTERIAL SHEATH CONFIGURATION: Brand: ENROUTE TRANSCAROTID NEUROPROTECTION SYSTEM

## (undated) DEVICE — LABEL MED VASC MRMC STRL

## (undated) DEVICE — NEEDLE HYPO 18GA L1.5IN PNK S STL HUB POLYPR SHLD REG BVL

## (undated) DEVICE — BLADE ASSEMB CLP HAIR FINE --

## (undated) DEVICE — BASIN EMSIS 16OZ GRAPHITE PLAS KID SHP MOLD GRAD FOR ORAL

## (undated) DEVICE — SUTURE PERMAHAND SZ 2-0 L30IN NONABSORBABLE BLK SILK W/O A305H

## (undated) DEVICE — DRAPE,FEM ANGIO,2 WIN,STERILE: Brand: MEDLINE

## (undated) DEVICE — GOWN,SIRUS,NONRNF,SETINSLV,2XL,18/CS: Brand: MEDLINE

## (undated) DEVICE — SYR 3ML LL TIP 1/10ML GRAD --

## (undated) DEVICE — CONTAINER SPEC 20 ML LID NEUT BUFF FORMALIN 10 % POLYPR STS

## (undated) DEVICE — BAG SPEC BIOHZRD 10 X 10 IN --

## (undated) DEVICE — BLOCK BITE ENDOSCP AD 21 MM W/ DIL BLU LF DISP

## (undated) DEVICE — SUTURE PROL SZ 5-0 L24IN NONABSORBABLE BLU RB-2 L13IN 1/2 8554H

## (undated) DEVICE — YANKAUER,TAPERED BULBOUS TIP,W/O VENT: Brand: MEDLINE

## (undated) DEVICE — PROVE COVER: Brand: UNBRANDED

## (undated) DEVICE — SPONGE GZ W4XL4IN COT 12 PLY TYP VII WVN C FLD DSGN

## (undated) DEVICE — PTA BALLOON DILATATION CATHETER: Brand: STERLING™

## (undated) DEVICE — CATH IV AUTOGRD BC PNK 20GA 25 -- INSYTE

## (undated) DEVICE — TOWEL 4 PLY TISS 19X30 SUE WHT

## (undated) DEVICE — INFLATION DEVICE: Brand: ENCORE™ 26

## (undated) DEVICE — STERILE COTTON BALLS LARGE 5/P: Brand: MEDLINE

## (undated) DEVICE — BLANKET WRM W25XL64IN NONWOVEN SFT LTWT PLIABLE HYPR

## (undated) DEVICE — Z DISCONTINUED PER MEDLINE LINE GAS SAMPLING O2/CO2 LNG AD 13 FT NSL W/ TBNG FILTERLINE

## (undated) DEVICE — FORCEPS BX L160CM DIA8MM GRSP DISECT CUP TIP NONLOCKING ROT

## (undated) DEVICE — ELECTRODE,RADIOTRANSLUCENT,FOAM,5PK: Brand: MEDLINE

## (undated) DEVICE — SOLIDIFIER MEDC 1200ML -- CONVERT TO 356117

## (undated) DEVICE — MICROPUNCTURE INTRODUCER SET SILHOUETTE TRANSITIONLESS WITH STAINLESS STEEL WIRE GUIDE: Brand: MICROPUNCTURE

## (undated) DEVICE — GUIDEWIRE VASCULAR L95CM DIA0.014IN ENROUTE

## (undated) DEVICE — SOL INJ SOD CL 0.9% 500ML BG --

## (undated) DEVICE — SYRINGE ANGIO CNTRST DEL 20 CC POLYCARB LIGHT GRN MEDALLION

## (undated) DEVICE — 1200 GUARD II KIT W/5MM TUBE W/O VAC TUBE: Brand: GUARDIAN

## (undated) DEVICE — SUTURE MCRYL SZ 4-0 L27IN ABSRB UD L19MM PS-2 1/2 CIR PRIM Y426H

## (undated) DEVICE — SUTURE VCRL SZ 3-0 L27IN ABSRB UD L26MM SH 1/2 CIR J416H